# Patient Record
Sex: FEMALE | Race: BLACK OR AFRICAN AMERICAN | NOT HISPANIC OR LATINO | Employment: OTHER | URBAN - METROPOLITAN AREA
[De-identification: names, ages, dates, MRNs, and addresses within clinical notes are randomized per-mention and may not be internally consistent; named-entity substitution may affect disease eponyms.]

---

## 2017-10-09 ENCOUNTER — TRANSFERRED RECORDS (OUTPATIENT)
Dept: HEALTH INFORMATION MANAGEMENT | Facility: CLINIC | Age: 52
End: 2017-10-09

## 2018-04-23 LAB
ALT SERPL-CCNC: 9 U/L (ref 7–33)
AST SERPL-CCNC: 11 U/L (ref 9–37)

## 2018-04-25 ENCOUNTER — TRANSFERRED RECORDS (OUTPATIENT)
Dept: HEALTH INFORMATION MANAGEMENT | Facility: CLINIC | Age: 53
End: 2018-04-25

## 2018-04-30 ENCOUNTER — TRANSFERRED RECORDS (OUTPATIENT)
Dept: HEALTH INFORMATION MANAGEMENT | Facility: CLINIC | Age: 53
End: 2018-04-30

## 2018-05-09 ENCOUNTER — TRANSFERRED RECORDS (OUTPATIENT)
Dept: HEALTH INFORMATION MANAGEMENT | Facility: CLINIC | Age: 53
End: 2018-05-09

## 2018-07-05 NOTE — PROGRESS NOTES
"  SUBJECTIVE:   Jae Ramos is a 53 year old female who presents to clinic today for the following health issues:    Abnormal Mood Symptoms      Duration: ***    Description:  Depression: { :930420::\"no\"}  Anxiety: { :259616::\"no\"}  Panic attacks: { :584604::\"no\"}     Accompanying signs and symptoms: see PHQ-9 and AMELIA scores    History (similar episodes/previous evaluation): {.:159472::\"None\"}    Precipitating or alleviating factors: {.:376624::\"None\"}    Therapies tried and outcome: {.:891588::\"none\"}      {additional problems for provider to add:651522}    Problem list and histories reviewed & adjusted, as indicated.  Additional history: {NONE - AS DOCUMENTED:531196::\"as documented\"}    {HIST REVIEW/ LINKS 2:496198}    Reviewed and updated as needed this visit by clinical staff       Reviewed and updated as needed this visit by Provider         {PROVIDER CHARTING PREFERENCE:552356}  "

## 2018-07-10 ENCOUNTER — OFFICE VISIT (OUTPATIENT)
Dept: PEDIATRICS | Facility: CLINIC | Age: 53
End: 2018-07-10
Payer: COMMERCIAL

## 2018-07-10 VITALS
WEIGHT: 223.1 LBS | BODY MASS INDEX: 35.02 KG/M2 | OXYGEN SATURATION: 95 % | DIASTOLIC BLOOD PRESSURE: 64 MMHG | SYSTOLIC BLOOD PRESSURE: 108 MMHG | TEMPERATURE: 97.8 F | HEART RATE: 94 BPM | HEIGHT: 67 IN

## 2018-07-10 DIAGNOSIS — E03.9 ACQUIRED HYPOTHYROIDISM: ICD-10-CM

## 2018-07-10 DIAGNOSIS — Z86.59 HISTORY OF BEHAVIORAL AND MENTAL HEALTH PROBLEMS: Primary | ICD-10-CM

## 2018-07-10 DIAGNOSIS — E78.00 HYPERCHOLESTEREMIA: ICD-10-CM

## 2018-07-10 PROCEDURE — 99203 OFFICE O/P NEW LOW 30 MIN: CPT | Performed by: INTERNAL MEDICINE

## 2018-07-10 RX ORDER — DIVALPROEX SODIUM 250 MG/1
500 TABLET, EXTENDED RELEASE ORAL AT BEDTIME
COMMUNITY
Start: 2018-04-17 | End: 2018-07-25

## 2018-07-10 RX ORDER — QUETIAPINE FUMARATE 400 MG/1
400 TABLET, FILM COATED ORAL DAILY
Refills: 5 | COMMUNITY
Start: 2018-06-10

## 2018-07-10 RX ORDER — ATORVASTATIN CALCIUM 40 MG/1
40 TABLET, FILM COATED ORAL DAILY
COMMUNITY
Start: 2018-06-04 | End: 2018-10-22

## 2018-07-10 RX ORDER — CHOLECALCIFEROL (VITAMIN D3) 50 MCG
2000 TABLET ORAL DAILY
COMMUNITY
Start: 2018-06-04 | End: 2018-07-25

## 2018-07-10 RX ORDER — LURASIDONE HYDROCHLORIDE 40 MG/1
40 TABLET, FILM COATED ORAL DAILY
COMMUNITY
Start: 2018-04-17 | End: 2018-07-25

## 2018-07-10 RX ORDER — TOPIRAMATE 50 MG/1
150 TABLET, FILM COATED ORAL AT BEDTIME
Refills: 5 | COMMUNITY
Start: 2018-06-10

## 2018-07-10 RX ORDER — OMEPRAZOLE 40 MG/1
40 CAPSULE, DELAYED RELEASE ORAL DAILY
COMMUNITY
Start: 2018-06-04 | End: 2018-10-22

## 2018-07-10 RX ORDER — LEVOTHYROXINE SODIUM 75 UG/1
75 TABLET ORAL DAILY
COMMUNITY
Start: 2018-06-04 | End: 2018-10-22

## 2018-07-10 RX ORDER — QUETIAPINE FUMARATE 50 MG/1
50 TABLET, FILM COATED ORAL DAILY
Refills: 5 | COMMUNITY
Start: 2018-06-10 | End: 2019-06-27

## 2018-07-10 NOTE — PATIENT INSTRUCTIONS
I will request records from your previous provider and review them prior to your follow-up appointment.  In the mean time, the  will contact you.

## 2018-07-10 NOTE — MR AVS SNAPSHOT
After Visit Summary   7/10/2018    Jae Ramos    MRN: [id number]           Patient Information     Date Of Birth          1965        Visit Information        Provider Department      7/10/2018 3:00 PM Rohit Velez Mai, MD; [employer] [facility]        Today's Diagnoses     History of behavioral and mental health problems    -  1      Care Instructions    I will request records from your previous provider and review them prior to your follow-up appointment.  In the mean time, the  will contact you.          Follow-ups after your visit        Additional Services     CARE COORDINATION REFERRAL       Services are provided by a Care Coordinator for people with complex needs such as: medical, social, or financial troubles.  The Care Coordinator works with the patient and their Primary Care Provider to determine health goals, obtain resources, achieve outcomes, and develop care plans that help coordinate the patient's care.     Reason for Referral: Mental Wellness (Health) (Mental Illness/Chemical Depedency): Resources of Behavioral Health Services    Additional pertinent details:  Patient has severe, persistent mental health disease - moved to [place] - previous behavioral health home in [place].    Clinical Staff have discussed the Care Coordination Referral with the patient and/or caregiver: yes                  Follow-up notes from your care team     Return in about 2 weeks (around 7/24/2018) for follow-up.      Your next 10 appointments already scheduled     Jul 24, 2018  2:30 PM CDT   Office Visit with Rohit Velez MD   [facility] ([facility])    [address] [place] [id number] [phone]           Bring a current list of meds and any records pertaining to this visit. For Physicals, please bring immunization records and any forms needing to be filled out. Please arrive 10 minutes early  "to complete paperwork.              Who to contact     If you have questions or need follow up information about today's clinic visit or your schedule please contact [facility] directly at [phone].  Normal or non-critical lab and imaging results will be communicated to you by MyChart, letter or phone within 4 business days after the clinic has received the results. If you do not hear from us within 7 days, please contact the clinic through MyChart or phone. If you have a critical or abnormal lab result, we will notify you by phone as soon as possible.  Submit refill requests through [employer] or call your pharmacy and they will forward the refill request to us. Please allow 3 business days for your refill to be completed.          Additional Information About Your Visit        Care EveryWhere ID     This is your Care EveryWhere ID. This could be used by other organizations to access your [place] medical records  [id number]        Your Vitals Were     Pulse Temperature Height Pulse Oximetry BMI (Body Mass Index)       94 97.8  F (36.6  C) (Oral) 5' 7\" (1.702 m) 95% 34.94 kg/m2        Blood Pressure from Last 3 Encounters:   07/10/18 108/64    Weight from Last 3 Encounters:   07/10/18 223 lb 1.6 oz (101.2 kg)              We Performed the Following     CARE COORDINATION REFERRAL        Primary Care Provider Fax #    Physician No Ref-Primary [phone]       No address on file        Equal Access to Services     MEÑO GAVIRIA : Hadii mouna ku hadasho Soliali, waaxda luqadaha, qaybta kaalmada adeegmeredithda, sagar mckeon . So [facility] [phone].    ATENCIÓN: Si habla español, tiene a sanchez disposición servicios gratuitos de asistencia lingüística. Feli al [phone].    We comply with applicable federal civil rights laws and [place] laws. We do not discriminate on the basis of race, color, national origin, age, disability, sex, sexual orientation, or gender identity.          "   Thank you!     Thank you for choosing [facility]  for your care. Our goal is always to provide you with excellent care. Hearing back from our patients is one way we can continue to improve our services. Please take a few minutes to complete the written survey that you may receive in the mail after your visit with us. Thank you!             Your Updated Medication List - Protect others around you: Learn how to safely use, store and throw away your medicines at www.disposemymeds.org.          This list is accurate as of 7/10/18  4:13 PM.  Always use your most recent med list.                   Brand Name Dispense Instructions for use Diagnosis    atorvastatin 40 MG tablet    LIPITOR     Take 40 mg by mouth daily        KWADWO CONTOUR test strip   Generic drug:  blood glucose monitoring           Calcium + D3 600-200 MG-UNIT Tabs           divalproex sodium extended-release 250 MG 24 hr tablet    DEPAKOTE ER     Take 500 mg by mouth 2 times daily        LATUDA 40 MG Tabs tablet   Generic drug:  lurasidone      Take 40 mg by mouth daily        levothyroxine 75 MCG tablet    SYNTHROID/LEVOTHROID     Take 75 mcg by mouth daily        omeprazole 40 MG capsule    priLOSEC     Take 40 mg by mouth daily        * QUEtiapine 400 MG tablet    SEROquel     Take 400 mg by mouth daily        * QUEtiapine 50 MG tablet    SEROquel     Take 50 mg by mouth daily        topiramate 50 MG tablet    TOPAMAX     Take 50 mg by mouth 3 times daily        vitamin D 2000 units tablet      Take 2,000 Units by mouth daily        * Notice:  This list has 2 medication(s) that are the same as other medications prescribed for you. Read the directions carefully, and ask your doctor or other care provider to review them with you.

## 2018-07-10 NOTE — PROGRESS NOTES
SUBJECTIVE:   Jae Ramos is a 53 year old female who presents to clinic today for the following health issues:    New Patient/Transfer of Care    Patient has depression, anxiety, schizophrenia. Patient experiences hallucinations, disorientation/confusion, and talks to herself and things that are not there. This is baseline per daughter.    Patient is here today with her step-daughter who has brought in legal guardianship paper that shows son's Brian and Bare have legal guardianship over patient due to mental instability.     PREVIOUS PROVIDERS  [facility] in [place], [place] by has been her primary clinic. Dr. Roc Vivar  Psychiatrist Dr. Coker has been prescribing medications.     Problem list and histories reviewed & adjusted, as indicated.  Additional history: as documented    Establish Care/Chart Review:    Health Status:  Patient does not make own medical decisions - children are legal proxies.  Patient has severe persistent mental health disorder.    PMH:    Bipolar    Pending records for more information    Medications:  Daughter brought meds and med rec updated.  Pending records    Current Outpatient Prescriptions   Medication     atorvastatin (LIPITOR) 40 MG tablet     KWADWO CONTOUR test strip     Calcium Carb-Cholecalciferol (CALCIUM + D3) 600-200 MG-UNIT TABS     Cholecalciferol (VITAMIN D) 2000 units tablet     divalproex sodium extended-release (DEPAKOTE ER) 250 MG 24 hr tablet     LATUDA 40 MG TABS tablet     levothyroxine (SYNTHROID/LEVOTHROID) 75 MCG tablet     omeprazole (PRILOSEC) 40 MG capsule     QUEtiapine (SEROQUEL) 400 MG tablet     QUEtiapine (SEROQUEL) 50 MG tablet     topiramate (TOPAMAX) 50 MG tablet     No current facility-administered medications for this visit.      Health Maintenance:    Pending records    Social hx:  Living:    Lives with children who take care of her.  No home health care currently.  Have required home health nurse in the past, but only intermittently.   "Family members able to administer meds and help with ADLs at this time.   Recently moved in May 2018 from [facility] to [place]   Previously had established health home with psychiatrist to manage meds in [place].   Need for increased social support?    No need for escalation of care per family, but may need more coordinated behavioral health home.    Reviewed and updated as needed this visit by clinical staff       Reviewed and updated as needed this visit by Provider         ROS:  + nocturia  + decreased energy  All other systems on a 7-point review are negative, unless otherwise noted in HPI      OBJECTIVE:     /64 (BP Location: Right arm, Patient Position: Chair)  Pulse 94  Temp 97.8  F (36.6  C) (Oral)  Ht 5' 7\" (1.702 m)  Wt 223 lb 1.6 oz (101.2 kg)  SpO2 95%  BMI 34.94 kg/m2  Body mass index is 34.94 kg/(m^2).  GENERAL: healthy, alert and no distress  EYES: Eyes grossly normal to inspection  NECK: no asymmetry, masses, or scars  MS: no gross musculoskeletal defects noted, no edema  SKIN: no suspicious lesions or rashes  PSYCH: calm, internally occupied and talking to self, will engage in conversation briefly, appears to be responding to auditory hallucinations    Diagnostic Test Results:  none     ASSESSMENT/PLAN:       1. History of behavioral and mental health problems  Patient here to establish care.  Diagnosed with severe and persistent mental illness, including psychosis.  Is cared for by family members.  Previously had behavioral health home and PCP, but recently moved to [facility].  Pending records from former healthcare provider.  Needs behavioral health home including psychiatry to manage neuroleptic medications.  Will initiate referral with care coordination.  - CARE COORDINATION REFERRAL    Will address nocturia and energy concerns at follow-up once I review records requested today.    2. Acquired hypothyroidism  On synthroid.  Appears stable clinically.  Pending records.    3. " Hypercholesteremia  On statin and baby aspirin.  Pending records for further details regarding any cardiovascular disease.      F/u in 2 weeks after full review of records and discussion with care coordination.     Greater than 50% of the 45 minute visit was spent in counseling and coordination of care regarding patient conditions above.    Charmaine Velez MD  [facility]

## 2018-07-11 ENCOUNTER — PATIENT OUTREACH (OUTPATIENT)
Dept: CARE COORDINATION | Facility: CLINIC | Age: 53
End: 2018-07-11

## 2018-07-11 NOTE — PROGRESS NOTES
Clinic Care Coordination Contact  [facility]/Voicemail    Referral Source: PCP  Clinical Data: Care Coordinator Outreach  Outreach attempted x 2.  Left message on voicemail with call back information and requested return call. Please see contact log for detailed outreach attempt dates.  Plan: Care Coordinator will mail out care coordination introduction letter with care coordinator contact information and explanation of care coordination services. Care Coordinator will do no further outreaches at this time.    MARIBELL Vora  Clinic Care Coordinator  [phone]  [email or web address]

## 2018-07-11 NOTE — LETTER
[place] CARE COORDINATION    July 16, 2018    Jae Ramos  [address] [place] [address]      Dear Jae,    I am a clinic care coordinator who works at the [facility]. I have been trying to reach you recently to introduce Clinic Care Coordination and to see if there was anything I could assist you with. I understand that you recently moved here and wanted to see if you needed any help finding your specialty providers or connecting with services. Below is a description of clinic care coordination and how I can further assist you.     The clinic care coordinator is a registered nurse and/or  who understand the health care system. The goal of clinic care coordination is to help you manage your health and improve access to the [facility] system in the most efficient manner. The registered nurse can assist you in meeting your health care goals by providing education, coordinating services, and strengthening the communication among your providers. The  can assist you with financial, behavioral, psychosocial, chemical dependency, counseling, and/or psychiatric resources.    Please feel free to contact me at [phone], with any questions or concerns. We at [facility] are focused on providing you with the highest-quality healthcare experience possible and that all starts with you.     Sincerely,     Sho Magallanes

## 2018-07-16 PROBLEM — E78.00 HYPERCHOLESTEREMIA: Status: ACTIVE | Noted: 2018-07-16

## 2018-07-16 PROBLEM — Z86.59 HISTORY OF BEHAVIORAL AND MENTAL HEALTH PROBLEMS: Status: ACTIVE | Noted: 2018-07-16

## 2018-07-21 NOTE — PROGRESS NOTES
SUBJECTIVE:   Jae Ramos is a 53 year old female who presents to clinic today for the following health issues:    Patient is here today to follow up on mental health/establish care.  Since our last visit, I have received records from her previous location of care.  Last OV: 7/10/2018.    Establish Care/Chart Review:    Health Status:  Patient does not make own medical decisions - children are legal proxies.  Patient has severe persistent mental health disorder.    PMH:    Schizoaffective disorder with bipolar subtype    Hypothyroidism    Last TSH 1.66, repeat due in October 2018    Type 2 DM    A1C 5.8 in October 2017, sugars stable    GERD    HLD    On statin    LDL 53,  (October 2017)    Medications:  Current Outpatient Prescriptions   Medication     atorvastatin (LIPITOR) 40 MG tablet     KWADWO CONTOUR test strip     levothyroxine (SYNTHROID/LEVOTHROID) 75 MCG tablet     omeprazole (PRILOSEC) 40 MG capsule     QUEtiapine (SEROQUEL) 400 MG tablet     QUEtiapine (SEROQUEL) 50 MG tablet     topiramate (TOPAMAX) 50 MG tablet     Calcium Carb-Cholecalciferol (CALCIUM + D3) 600-200 MG-UNIT TABS     Cholecalciferol (VITAMIN D) 2000 units tablet     divalproex sodium extended-release (DEPAKOTE ER) 500 MG 24 hr tablet     IBUPROFEN PO     No current facility-administered medications for this visit.        Social hx:  Living:    Lives with children who take care of her.  No home health care currently.  Have required home health nurse in the past, but only intermittently.  Family members able to administer meds and help with ADLs at this time.   Recently moved in May 2018 from [facility] to [place]   Previously had established health home with psychiatrist to manage meds in [place].   Need for increased social support?    No need for escalation of care per family, but may need more coordinated behavioral health home.    Reviewed and updated as needed this visit by clinical staff       Reviewed and updated as needed  this visit by Provider         ROS:  + nocturia  + decreased energy  All other systems on a 7-point review are negative, unless otherwise noted in HPI    ROS:  All other systems on a 10-point review are negative, unless otherwise noted in HPI      OBJECTIVE:     BP 98/60 (BP Location: Right arm, Patient Position: Chair)  Pulse 106  Temp 98  F (36.7  C) (Oral)  Wt 222 lb 12.8 oz (101.1 kg)  SpO2 93%  BMI 34.9 kg/m2  Body mass index is 34.9 kg/(m^2).  GENERAL: elderly, overweight, alert and no distress  EYES: Eyes grossly normal to inspection  NECK: no asymmetry, masses, or scars  MS: no gross musculoskeletal defects noted, no edema  SKIN: no suspicious lesions or rashes  PSYCH: calm, internally occupied and talking to self, will engage in conversation briefly, appears to be responding to auditory hallucinations    Diagnostic Test Results:  none     ASSESSMENT/PLAN:     52 yo Somalian female here for establish care f/u after receiving previous records.    1. Schizoaffective disorder, bipolar type (H)  Discussed case with care coordinator, who is working on transferring home health services from previous county to current.  Needs psychiatry referral for medication management.  In mean time, will send to our pharmacy ([facility]) for management in the interim.  - MENTAL HEALTH REFERRAL  - Adult; Psychiatry and Medication Management; Psychiatry; Other: Behavioral Healthcare Providers [phone]; We will contact you to schedule the appointment or please call with any questions    2. Type 2 diabetes mellitus with complication, without long-term current use of insulin (H)  Stable.  A1C well-controlled at 5.8.  F/u in October for 6 mo recheck.    3. Gastroesophageal reflux disease without esophagitis  Stable, well controlled on current regimen.    4. Acquired hypothyroidism  Stable, last TSH 1.66.  Next due Oct 2018.    5. Hypercholesteremia  Lipids well controlled on statin therapy.  Due for recheck in Oct  2018.      Patient Instructions   Referral to psychiatry made today.  They will contact you to make the first appointment with psychiatrist.  Please make sure to answer phone calls regarding scheduling initial appointment.  Will work in collaboration with care coordinator, Sho Zimmerman.    Please follow-up with me in 3 months for physical exam.  Please come fasting.    Greater than 50% of the 25 minute visit was spent in counseling and coordination of care, using a , regarding patient conditions above.      Charmaine Velez MD  [facility]

## 2018-07-24 ENCOUNTER — OFFICE VISIT (OUTPATIENT)
Dept: PEDIATRICS | Facility: CLINIC | Age: 53
End: 2018-07-24
Payer: COMMERCIAL

## 2018-07-24 ENCOUNTER — PATIENT OUTREACH (OUTPATIENT)
Dept: CARE COORDINATION | Facility: CLINIC | Age: 53
End: 2018-07-24

## 2018-07-24 VITALS
BODY MASS INDEX: 34.9 KG/M2 | HEART RATE: 106 BPM | OXYGEN SATURATION: 93 % | TEMPERATURE: 98 F | SYSTOLIC BLOOD PRESSURE: 98 MMHG | WEIGHT: 222.8 LBS | DIASTOLIC BLOOD PRESSURE: 60 MMHG

## 2018-07-24 DIAGNOSIS — E11.8 TYPE 2 DIABETES MELLITUS WITH COMPLICATION, WITHOUT LONG-TERM CURRENT USE OF INSULIN (H): ICD-10-CM

## 2018-07-24 DIAGNOSIS — E78.00 HYPERCHOLESTEREMIA: ICD-10-CM

## 2018-07-24 DIAGNOSIS — F25.0 SCHIZOAFFECTIVE DISORDER, BIPOLAR TYPE (H): Primary | ICD-10-CM

## 2018-07-24 DIAGNOSIS — E03.9 ACQUIRED HYPOTHYROIDISM: ICD-10-CM

## 2018-07-24 DIAGNOSIS — K21.9 GASTROESOPHAGEAL REFLUX DISEASE WITHOUT ESOPHAGITIS: ICD-10-CM

## 2018-07-24 PROCEDURE — 99214 OFFICE O/P EST MOD 30 MIN: CPT | Performed by: INTERNAL MEDICINE

## 2018-07-24 NOTE — LETTER
[place] CARE COORDINATION    July 26, 2018    Brian Poon    On behalf of     Jae Ramos  [address] [place] [address]      Dear Jae,    I am a clinic care coordinator who works with Charmaine Velez MD at [facility]. I wanted to thank you for spending the time to talk with me. Included is a flyer with a description of clinic care coordination and how I can further assist you.     Just a reminder you have an appointment coming up.    Appointment scheduled with:  [facility]   [address]  [place] [address]  [phone]  Date: 8/1/2018  Time: 1:00pm for paperwork 2:00pm  Provider: LUCERO Evans    Also, your [facility] services will continue to be managed by [facility] until 9/1/18. If you have any questions about services until that time you will need to contact Jae's Regina CASON-[phone] to discuss.     Please feel free to contact me at [phone], with any questions or concerns. We at [facility] are focused on providing you with the highest-quality healthcare experience possible and that all starts with you.     Sincerely,     Sho Magallanes  Enclosed: I have enclosed a copy of the Complex Care Plan. This has helpful information and goals that we have talked about. Please keep this in an easy to access place to use as needed.

## 2018-07-24 NOTE — LETTER
[facility]  Complex Care Plan  About Me  Patient Name:  Jae Ramos    YOB: 1965  Age:     53 year old   [facility] MRN:   [id number] Telephone Information:    Home Phone [phone]   Mobile [phone]       Address:    [address] [place] [address] [address] [place] [address] Email address:  No e-mail address on record      Emergency Contact(s)  Name Relationship Lgl Grd Work Phone Home Phone Mobile Phone   1. FAWN PAIZ* Son   [phone]           Primary language:  Citizen of [place]     needed? Yes   [facility]:  [phone] op. 1  Other communication barriers: Language barrier, Cognitive impairment- Guardianship in place to assist, Caregiver, English as a second language  Preferred Method of Communication:   Phone and E-mail  Current living arrangement: I live in a private home with family  Mobility Status/ Medical Equipment:      Health Maintenance  Health Maintenance Reviewed: Due/Overdue     My Access Plan  Medical Emergency 911   Primary Clinic Line [facility] - [phone]   24 Hour Appointment Line [phone] or  [phone]) (toll-free)   24 Hour Nurse Line [phone] (toll-free)   Preferred Urgent Care [facility] [place], [phone]   Preferred Hospital [facility]  [phone]   Preferred Pharmacy [facility] - [address]      Behavioral Health Crisis Line The National Suicide Prevention Lifeline at 1-575.958.5105 or 911     My Care Team Members    Patient Care Team       Relationship Specialty Notifications Start End    Rohit Velez Mai, MD PCP - General Internal Medicine  7/24/18     Phone: [phone] Fax: [phone] [address] [place] [address]    Sho Magallanes LSW Lead Care Coordinator Primary Care - CC  7/24/18     Phone: [phone] Fax: [phone]                My Care Plans  Self Management and  Treatment Plan  Goals   Goals        General    1.Mental Health Management (pt-stated)     Notes - Note created  7/25/2018  3:17 PM by Sho Magallanes LSW    Goal Statement: I (pt's family/guardian) would like to find a psychiatrist.   Measure of Success: psychiatry established communication with PCP  Supportive Steps to Achieve: PCP made a referral to P pt was contacted   Barriers: language, [facility] guardian  Strengths: insurance coverage  Date to Achieve By: 9/2018               Advance Care Plans/Directives Type:   Type Advanced Care Plans/Directives: Other (Guardianship)    My Medical and Care Information  Problem List   Patient Active Problem List   Diagnosis     History of behavioral and mental health problems     Acquired hypothyroidism     Hypercholesteremia     Type 2 diabetes mellitus (H)     Gastroesophageal reflux disease without esophagitis     Schizoaffective disorder, bipolar type (H)      Current Medications and Allergies:  See printed Medication Report.    Care Coordination Start Date: 7/24/2018   Frequency of Care Coordination: monthly   Form Last Updated: 07/25/2018

## 2018-07-24 NOTE — PROGRESS NOTES
Clinic Care Coordination Contact  Care Team Conversations      Discussed possible resources for PCP to share during appointment.     [facility]  [address]  [place] [address]  P: [phone]  F: [phone]  [facility] provides a wide array of services and programs including outpatient clinics, community-based support and rehabilitative programs, hospital liaison services, intensive family services, and long-term supports. Services are available to anyone.  Our specialty is caring for people with severe and persistent mental illness (such as schizophrenia, major depression, post-traumatic stress disorder, and bipolar disorder) and other severely disabling disorders requiring crisis resolution or ongoing and long-term support and treatment. We are the provider of choice for maintaining people with these disorders at home, and off the streets, out of the jails, and out of the hospital.-- bilingual/bicultural mental health professionals. It is dedicated to providing culturally competent mental health and other  to families, including refugee and immigrant with multiple layers of complex needs, exposure to violence and trauma both in their current environment and in their native countries, and weakening intergenerational relationships.? Staff in this agency speaks multiple languages including English, Algerian, French, Oromo, Hebrew, and Swahili.    [facility]  [facility]  Telephone (International Code +1) [phone]  Toll Free [phone]  Torture survivor rehabilitation services in [place] [phone]  [address] [place] [address]  In [place], torture survivors receive out-patient care at our [facility] in [place]. A team of healers provides medical and nursing care, psychotherapy,  and massage and physical therapy.    MARIBELL Vora  Clinic Care  Coordinator  [phone]  [email or web address]

## 2018-07-24 NOTE — PATIENT INSTRUCTIONS
Referral to psychiatry made today.  They will contact you to make the first appointment with psychiatrist.  Please make sure to answer phone calls regarding scheduling initial appointment.  Will work in collaboration with care coordinator, Sho Zimmerman.    Please follow-up with me in 3 months for physical exam.  Please come fasting.

## 2018-07-24 NOTE — MR AVS SNAPSHOT
After Visit Summary   7/24/2018    Jae Ramos    MRN: [id number]           Patient Information     Date Of Birth          1965        Visit Information        Provider Department      7/24/2018 2:15 PM Rohit Velez Mai, MD; [facility] [employer] [facility]        Today's Diagnoses     Type 2 diabetes mellitus with complication, without long-term current use of insulin (H)        Gastroesophageal reflux disease without esophagitis        Schizoaffective disorder, bipolar type (H)          Care Instructions    Referral to psychiatry made today.  They will contact you to make the first appointment with psychiatrist.  Please make sure to answer phone calls regarding scheduling initial appointment.  Will work in collaboration with care coordinator, Sho Zimmerman.    Please follow-up with me in 3 months for physical exam.  Please come fasting.          Follow-ups after your visit        Additional Services     MED THERAPY MANAGE REFERRAL       Your provider has referred you to: **[facility] Scheduling (numerous locations) [phone]   [email or web address]  [facility]: [facility] [phone]   [email or web address]    Reason for Referral: medication management (psychiatric medications and transferring care from a different county).    The [facility] department will contact you to schedule an appointment.  You may also schedule the appointment by calling [phone].  For [facility] [place] patients, please call [phone] to confirm/schedule your appointment on the next business day.    This service is designed to help you get the most from your medications.  A specially trained Pharmacist will work closely with you and your providers to solve any questions, concerns, issues or problems related to your  medications.    Please bring all of your prescription and non-prescription medications (such as vitamins, over-the-counter medications, and herbals) or a detailed medication list to your appointment.    If you have a glucose meter or other home monitoring information, please also bring this to your appointment (i.e. blood glucose log, blood pressure log, pain log, etc.).            MENTAL HEALTH REFERRAL  - Adult; Psychiatry and Medication Management; Psychiatry; Other: [facility] Providers [phone]; We will contact you to schedule the appointment or please call with any questions       All scheduling is subject to the client's specific insurance plan & benefits, provider/location availability, and provider clinical specialities.  Please arrive 15 minutes early for your first appointment and bring your completed paperwork.    Please be aware that coverage of these services is subject to the terms and limitations of your health insurance plan.  Call member services at your health plan with any benefit or coverage questions.                            Follow-up notes from your care team     Return in about 3 months (around 10/24/2018) for Physical Exam, Lab Work.      Your next 10 appointments already scheduled     Jul 25, 2018  8:30 AM CDT   Office Visit with [facility]Vka [facility] ([facility])    [address] [place] [address]  [address]  [facility] [address] [phone]           Bring a current list of meds and any records pertaining to this visit. For Physicals, please bring immunization records and any forms needing to be filled out. Please arrive 10 minutes early to complete paperwork.            Oct 22, 2018  3:50 PM CDT   PHYSICAL with Rohit Velez MD   [facility] ([facility])    [address] [place] [address]  [address]  [facility] [address] [phone]              Who to contact     If you have questions or  need follow up information about today's clinic visit or your schedule please contact [facility] MARIELLE directly at [phone].  Normal or non-critical lab and imaging results will be communicated to you by MyChart, letter or phone within 4 business days after the clinic has received the results. If you do not hear from us within 7 days, please contact the clinic through MyChart or phone. If you have a critical or abnormal lab result, we will notify you by phone as soon as possible.  Submit refill requests through [employer] or call your pharmacy and they will forward the refill request to us. Please allow 3 business days for your refill to be completed.          Additional Information About Your Visit        Care EveryWhere ID     This is your Care EveryWhere ID. This could be used by other organizations to access your [place] medical records  [id number]        Your Vitals Were     Pulse Temperature Pulse Oximetry BMI (Body Mass Index)          106 98  F (36.7  C) (Oral) 93% 34.9 kg/m2         Blood Pressure from Last 3 Encounters:   07/24/18 98/60   07/10/18 108/64    Weight from Last 3 Encounters:   07/24/18 222 lb 12.8 oz (101.1 kg)   07/10/18 223 lb 1.6 oz (101.2 kg)              We Performed the Following     MED THERAPY MANAGE REFERRAL     MENTAL HEALTH REFERRAL  - Adult; Psychiatry and Medication Management; Psychiatry; Other: [facility] Providers [phone]; We will contact you to schedule the appointment or please call with any questions        Primary Care Provider Office Phone # Fax #    Micaelantv Devi Velez [phone] [address] [place] [address]        Equal Access to Services     [facility]: Hadii mouna Mobley, waaxda luqadaha, qaybta kaalsagar quintanilla. So [facility] [phone].    ATENCIÓN: Si habla español, tiene a sanchez disposición servicios gratuitos de asistencia lingüística. Llame al  [phone].    We comply with applicable federal civil rights laws and [place] laws. We do not discriminate on the basis of race, color, national origin, age, disability, sex, sexual orientation, or gender identity.            Thank you!     Thank you for choosing [facility] MARIEL[facility]  for your care. Our goal is always to provide you with excellent care. Hearing back from our patients is one way we can continue to improve our services. Please take a few minutes to complete the written survey that you may receive in the mail after your visit with us. Thank you!             Your Updated Medication List - Protect others around you: Learn how to safely use, store and throw away your medicines at www.disposemymeds.org.          This list is accurate as of 7/24/18  3:52 PM.  Always use your most recent med list.                   Brand Name Dispense Instructions for use Diagnosis    atorvastatin 40 MG tablet    LIPITOR     Take 40 mg by mouth daily        KWADWO CONTOUR test strip   Generic drug:  blood glucose monitoring           Calcium + D3 600-200 MG-UNIT Tabs           divalproex sodium extended-release 250 MG 24 hr tablet    DEPAKOTE ER     Take 500 mg by mouth 2 times daily        LATUDA 40 MG Tabs tablet   Generic drug:  lurasidone      Take 40 mg by mouth daily        levothyroxine 75 MCG tablet    SYNTHROID/LEVOTHROID     Take 75 mcg by mouth daily        omeprazole 40 MG capsule    priLOSEC     Take 40 mg by mouth daily        * QUEtiapine 400 MG tablet    SEROquel     Take 400 mg by mouth daily        * QUEtiapine 50 MG tablet    SEROquel     Take 50 mg by mouth daily        topiramate 50 MG tablet    TOPAMAX     Take 50 mg by mouth 3 times daily        vitamin D 2000 units tablet      Take 2,000 Units by mouth daily        * Notice:  This list has 2 medication(s) that are the same as other medications prescribed for you. Read the directions carefully, and ask your doctor or other care provider to review  them with you.

## 2018-07-25 ENCOUNTER — HEALTH MAINTENANCE LETTER (OUTPATIENT)
Age: 53
End: 2018-07-25

## 2018-07-25 ENCOUNTER — OFFICE VISIT (OUTPATIENT)
Dept: PHARMACY | Facility: CLINIC | Age: 53
End: 2018-07-25
Payer: COMMERCIAL

## 2018-07-25 VITALS
BODY MASS INDEX: 35.08 KG/M2 | HEART RATE: 92 BPM | SYSTOLIC BLOOD PRESSURE: 125 MMHG | WEIGHT: 224 LBS | DIASTOLIC BLOOD PRESSURE: 78 MMHG

## 2018-07-25 DIAGNOSIS — G89.29 OTHER CHRONIC PAIN: ICD-10-CM

## 2018-07-25 DIAGNOSIS — E78.00 HYPERCHOLESTEREMIA: ICD-10-CM

## 2018-07-25 DIAGNOSIS — E11.8 TYPE 2 DIABETES MELLITUS WITH COMPLICATION, WITHOUT LONG-TERM CURRENT USE OF INSULIN (H): ICD-10-CM

## 2018-07-25 DIAGNOSIS — K21.9 GASTROESOPHAGEAL REFLUX DISEASE WITHOUT ESOPHAGITIS: ICD-10-CM

## 2018-07-25 DIAGNOSIS — F25.0 SCHIZOAFFECTIVE DISORDER, BIPOLAR TYPE (H): Primary | ICD-10-CM

## 2018-07-25 DIAGNOSIS — E03.9 ACQUIRED HYPOTHYROIDISM: ICD-10-CM

## 2018-07-25 DIAGNOSIS — E55.9 VITAMIN D DEFICIENCY: ICD-10-CM

## 2018-07-25 DIAGNOSIS — Z91.89 AT RISK FOR BONE DENSITY LOSS: ICD-10-CM

## 2018-07-25 PROCEDURE — 99605 MTMS BY PHARM NP 15 MIN: CPT | Performed by: PHARMACIST

## 2018-07-25 PROCEDURE — 99607 MTMS BY PHARM ADDL 15 MIN: CPT | Performed by: PHARMACIST

## 2018-07-25 RX ORDER — DIVALPROEX SODIUM 500 MG/1
1000 TABLET, EXTENDED RELEASE ORAL AT BEDTIME
COMMUNITY
End: 2018-07-25

## 2018-07-25 RX ORDER — CHOLECALCIFEROL (VITAMIN D3) 50 MCG
2000 TABLET ORAL DAILY
Qty: 30 TABLET | Refills: 11 | Status: SHIPPED | OUTPATIENT
Start: 2018-07-25 | End: 2018-10-22

## 2018-07-25 NOTE — PATIENT INSTRUCTIONS
Recommendations from today's MTM visit:                                                    Today we reviewed what your medicines are for, how to know if they are working, that your medicines are safe and how to make your medicine regimen as easy as possible.      1. Set up medications in pill boxes, AM and PM.  Separate the calcium+D and vitamin D and take with food during the day.    2. Vika will ask Dr. Velez about refills on Depakote and vitamin D.     Next MTM/pharmacist visit: as needed    To schedule another MTM appointment, please call the clinic directly or you may call the MTM scheduling line at [phone] or toll-free at [phone].     My Clinical Pharmacist's contact information:                                                      It was a pleasure seeing you today!  Please feel free to contact me with any questions or concerns you have.      Vika Peterson, PharmD [facility]  Medication Therapy Management Practitioner   #[phone]    You may receive a survey about the MTM services you received.  I would appreciate your feedback to help me serve you better in the future. Please fill it out and return it when you can. Your comments will be anonymous.

## 2018-07-25 NOTE — Clinical Note
Sent refill encounter for the depakote.  I did not put her down for ongoing MTM follow-ups but would be happy to do this if you want me to.  Maybe once a year? Vika

## 2018-07-25 NOTE — PROGRESS NOTES
Clinic Care Coordination Contact  Clinic Care Coordination Contact  OUTREACH    Referral Information:  Referral Source: PCP    Primary Diagnosis: Behavioral Health    Chief Complaint   Patient presents with     Clinic Care Coordination - Follow-up     Mental Health        Universal Utilization: Pt needs to establish with specialty providers including psychiatry after moving from [place], [place]  Utilization    Last refreshed: 7/25/2018  9:12 AM:  No Show Count (past year) 0       Last refreshed: 7/25/2018  9:12 AM:  ED visits 0       Last refreshed: 7/25/2018  9:12 AM:  Hospital admissions 0          Current as of: 7/25/2018  9:12 AM           Clinical Concerns:  Current Medical Concerns: Pt established care in the clinic on 7/10/18. PCP requested that [facility] help facilitate psychiatric care and assist with the transfer of [facility] services. Outreach to pt's family today to discuss.     Appointment scheduled with:  Sushila and Meeta   [address]  [address]  [place] [address]  [phone]  Date: 8/1/2018  Time: 1:00pm for paperwork 2:00pm  Provider: LUCERO Evans    Education Provided to patient: Provided information for psychiatry services, and counseling including information below. Pt's son/guardian reports that the pt didn't experience torture, but does have significant PTSD affects and will review information about [facility].    Health Maintenance Reviewed: Due/Overdue     Medication Management:  Pt is needing medication management from a MH provider. Pt was scheduled for an appointment next week for medication management.     Functional Status:  Dependent upon family for all ADL's     Living Situation:  Current living arrangement:: I live in a private home with family  Type of residence:: Private home - stairs    Transportation:  Transportation concerns: No  Transportation means:: Medical transport, Family     Psychosocial:  Bahai or spiritual beliefs that impact treatment::  No  Mental health DX:: Yes  Mental health DX how managed:: Medication  Mental health management concern: Yes- complex medication regimen. HP not complete from previous provider. Needs new provider asap.  Informal Support system:: Family, Children  Financial/Insurance: Amy UNGER, Left a message with [facility] Intake      Resources and Interventions:  Current Resources: Pt was previously enrolled in [facility] Resources: None  Advanced Care Plans/Directives on file:: Yes  Type Advanced Care Plans/Directives: Other (Guardianship)     Goals:   Goals        General    1.Mental Health Management (pt-stated)     Notes - Note created  7/25/2018  3:17 PM by Sho Magallanes LSW    Goal Statement: I (pt's family/guardian) would like to find a psychiatrist.   Measure of Success: psychiatry established communication with PCP  Supportive Steps to Achieve: PCP made a referral to P pt was contacted   Barriers: language, UTC guardian  Strengths: insurance coverage  Date to Achieve By: 9/2018            Patient/Caregiver understanding: Pt reports understanding and denies any additional questions or concerns at this times. MAXINE CC engaged in AIDET communication during encounter.    Outreach Frequency: monthly  Future Appointments              In 2 months Rohit Velez Mai, MD [facility] KEILA Carlson          Plan: Pt's family will assist pt with appointment. Pt's son/guardian will review information about counseling center. [facility] will await return call from [facility] to complete intake referral.     MARIBELL Vora  Clinic Care Coordinator  [phone]  [email or web address]

## 2018-07-25 NOTE — MR AVS SNAPSHOT
After Visit Summary   7/25/2018    Jae Ramos    MRN: [id number]           Patient Information     Date Of Birth          1965        Visit Information        Provider Department      7/25/2018 8:30 AM Vika Peterson [facility]; [employer] [facility] MTM        Care Instructions    Recommendations from today's MTM visit:                                                    Today we reviewed what your medicines are for, how to know if they are working, that your medicines are safe and how to make your medicine regimen as easy as possible.      1. Set up medications in pill boxes, AM and PM.  Separate the calcium+D and vitamin D and take with food during the day.    2. Vika will ask Dr. Velez about refills on Depakote and vitamin D.     Next MTM/pharmacist visit: as needed    To schedule another MTM appointment, please call the clinic directly or you may call the MTM scheduling line at [phone] or toll-free at [phone].     My Clinical Pharmacist's contact information:                                                      It was a pleasure seeing you today!  Please feel free to contact me with any questions or concerns you have.      Vika Peterson, PharmD [facility]  Medication Therapy Management Practitioner   #[phone]    You may receive a survey about the MTM services you received.  I would appreciate your feedback to help me serve you better in the future. Please fill it out and return it when you can. Your comments will be anonymous.                  Follow-ups after your visit        Your next 10 appointments already scheduled     Oct 22, 2018  3:50 PM CDT   PHYSICAL with Rohit [facility]elez MD   [facility]    [address] [place] [address] [phone]              Who to contact     If you have questions or need follow up information about today's clinic visit or your  schedule please contact [facility] MARIELLE MENDOZA directly at [phone].  Normal or non-critical lab and imaging results will be communicated to you by MyChart, letter or phone within 4 business days after the clinic has received the results. If you do not hear from us within 7 days, please contact the clinic through MyChart or phone. If you have a critical or abnormal lab result, we will notify you by phone as soon as possible.  Submit refill requests through [employer] or call your pharmacy and they will forward the refill request to us. Please allow 3 business days for your refill to be completed.          Additional Information About Your Visit        Care EveryWhere ID     This is your Care EveryWhere ID. This could be used by other organizations to access your [place] medical records  [id number]        Your Vitals Were     Pulse BMI (Body Mass Index)                92 35.08 kg/m2           Blood Pressure from Last 3 Encounters:   07/25/18 125/78   07/24/18 98/60   07/10/18 108/64    Weight from Last 3 Encounters:   07/25/18 224 lb (101.6 kg)   07/24/18 222 lb 12.8 oz (101.1 kg)   07/10/18 223 lb 1.6 oz (101.2 kg)              Today, you had the following     No orders found for display         Today's Medication Changes          These changes are accurate as of 7/25/18  9:07 AM.  If you have any questions, ask your nurse or doctor.               These medicines have changed or have updated prescriptions.        Dose/Directions    Calcium + D3 600-200 MG-UNIT Tabs   This may have changed:    - how much to take  - how to take this  - when to take this   Changed by:  Vika Peterson, [facility]        Dose:  1 tablet   Take 1 tablet by mouth 2 times daily   Refills:  0                Primary Care Provider Office Phone # Fax #    Rohit Velez [phone] [address] [place] [address]        Equal Access to Services     MEÑO GAVIRIA AH: Yenni Mobley  washankarda ginaalpa, qaybta katawana ford, sagar gunnaajakob ah. So [facility] [phone].    ATENCIÓN: Si ciara bocanegra, tiene a sanchez disposición servicios gratuitos de asistencia lingüística. Feli al [phone].    We comply with applicable federal civil rights laws and [place] laws. We do not discriminate on the basis of race, color, national origin, age, disability, sex, sexual orientation, or gender identity.            Thank you!     Thank you for choosing [facility]  for your care. Our goal is always to provide you with excellent care. Hearing back from our patients is one way we can continue to improve our services. Please take a few minutes to complete the written survey that you may receive in the mail after your visit with us. Thank you!             Your Updated Medication List - Protect others around you: Learn how to safely use, store and throw away your medicines at www.disposemymeds.org.          This list is accurate as of 7/25/18  9:07 AM.  Always use your most recent med list.                   Brand Name Dispense Instructions for use Diagnosis    atorvastatin 40 MG tablet    LIPITOR     Take 40 mg by mouth daily        KWADWO CONTOUR test strip   Generic drug:  blood glucose monitoring      1 strip by In Vitro route daily as needed        Calcium + D3 600-200 MG-UNIT Tabs      Take 1 tablet by mouth 2 times daily        divalproex sodium extended-release 500 MG 24 hr tablet    DEPAKOTE ER     Take 1,000 mg by mouth At Bedtime        IBUPROFEN PO      Take 200 mg by mouth daily as needed for moderate pain        levothyroxine 75 MCG tablet    SYNTHROID/LEVOTHROID     Take 75 mcg by mouth daily        omeprazole 40 MG capsule    priLOSEC     Take 40 mg by mouth daily        * QUEtiapine 400 MG tablet    SEROquel     Take 400 mg by mouth daily Take along with 50 mg tab=450 mg daily        * QUEtiapine 50 MG tablet    SEROquel     Take 50 mg by mouth daily Take  along with 400 mg tab=450 mg daily. Can take an extra 50 mg tab as needed        topiramate 50 MG tablet    TOPAMAX     Take 150 mg by mouth At Bedtime        vitamin D 2000 units tablet      Take 2,000 Units by mouth daily        * Notice:  This list has 2 medication(s) that are the same as other medications prescribed for you. Read the directions carefully, and ask your doctor or other care provider to review them with you.

## 2018-07-25 NOTE — TELEPHONE ENCOUNTER
See MTM note from today, patient needs a refill.  Based on previous labs and symptoms I would continue this dose and have VPA monitored in a few months when she sees psychiatry.  MTM does not have CPA to refill this medications.    Thanks!

## 2018-07-26 RX ORDER — DIVALPROEX SODIUM 500 MG/1
1000 TABLET, EXTENDED RELEASE ORAL AT BEDTIME
Qty: 60 TABLET | Refills: 2 | Status: SHIPPED | OUTPATIENT
Start: 2018-07-26

## 2018-07-26 NOTE — PROGRESS NOTES
Clinic Care Coordination Contact  Care Team Conversations    Spoke with [facility] and pt's care will continue to be managed by [facility] until 9/1/18. Should pt and family have any questions about services until that time they will need to contact the [place] CASON-[phone] to discuss. E-mail sent to pt's guardian with this information. [facility] will follow-up in 4 weeks.    MARIBELL Vora  Clinic Care Coordinator  [phone]  [email or web address]

## 2018-07-29 ENCOUNTER — DOCUMENTATION ONLY (OUTPATIENT)
Dept: OTHER | Facility: CLINIC | Age: 53
End: 2018-07-29

## 2018-09-24 ENCOUNTER — PATIENT OUTREACH (OUTPATIENT)
Dept: CARE COORDINATION | Facility: CLINIC | Age: 53
End: 2018-09-24

## 2018-09-24 NOTE — LETTER
[place] CARE COORDINATION    September 26, 2018    Selma Poon     On Behalf of    Jae Ramos  [address] [place] [address]    Dear Jae,    I am a clinic care coordinator who works with Charmaine Velez MD. I recently tried to call and was unable to reach you. I wanted to follow up with you on the resources we sent to you as well as the psychiatry appointment that we helped you set up. Included is a flyer with a description of clinic care coordination and how I can further assist you.     Please feel free to contact me at [phone], with any questions or concerns.     Sincerely,     Sho Magallanes

## 2018-09-24 NOTE — PROGRESS NOTES
Clinic Care Coordination Contact  [facility]/Voicemail    Clinical Data: Care Coordinator Outreach  Outreach attempted x 2.  Left message on voicemail with call back information and requested return call. Please see contact log for detailed outreach attempt dates.  Plan: Care Coordinator will mail out care coordination introduction/ unable to contact letter with care coordinator contact information and explanation of care coordination services. Care Coordinator will do no further outreaches at this time.    MARIBELL Vora  Clinic Care Coordinator  [phone]  [email or web address]

## 2018-10-18 ENCOUNTER — ALLIED HEALTH/NURSE VISIT (OUTPATIENT)
Dept: NURSING | Facility: CLINIC | Age: 53
End: 2018-10-18
Payer: COMMERCIAL

## 2018-10-18 DIAGNOSIS — Z79.899 NEED FOR PROPHYLACTIC CHEMOTHERAPY: Primary | ICD-10-CM

## 2018-10-18 DIAGNOSIS — Z13.9 SCREENING FOR CONDITION: Primary | ICD-10-CM

## 2018-10-18 LAB
ALBUMIN SERPL-MCNC: 2.8 G/DL (ref 3.4–5)
ALP SERPL-CCNC: 115 U/L (ref 40–150)
ALT SERPL W P-5'-P-CCNC: 20 U/L (ref 0–50)
AMYLASE SERPL-CCNC: 27 U/L (ref 30–110)
AST SERPL W P-5'-P-CCNC: 17 U/L (ref 0–45)
BASOPHILS # BLD AUTO: 0 10E9/L (ref 0–0.2)
BASOPHILS NFR BLD AUTO: 0.4 %
BILIRUB DIRECT SERPL-MCNC: <0.1 MG/DL (ref 0–0.2)
BILIRUB SERPL-MCNC: 0.2 MG/DL (ref 0.2–1.3)
CHOLEST SERPL-MCNC: 148 MG/DL
DIFFERENTIAL METHOD BLD: ABNORMAL
EOSINOPHIL # BLD AUTO: 0.1 10E9/L (ref 0–0.7)
EOSINOPHIL NFR BLD AUTO: 1.1 %
ERYTHROCYTE [DISTWIDTH] IN BLOOD BY AUTOMATED COUNT: 14.6 % (ref 10–15)
GLUCOSE SERPL-MCNC: 128 MG/DL (ref 70–99)
HBA1C MFR BLD: 6.3 % (ref 0–5.6)
HCT VFR BLD AUTO: 41.1 % (ref 35–47)
HDLC SERPL-MCNC: 36 MG/DL
HGB BLD-MCNC: 12.6 G/DL (ref 11.7–15.7)
LDLC SERPL CALC-MCNC: 53 MG/DL
LYMPHOCYTES # BLD AUTO: 2.8 10E9/L (ref 0.8–5.3)
LYMPHOCYTES NFR BLD AUTO: 38.8 %
MCH RBC QN AUTO: 27.8 PG (ref 26.5–33)
MCHC RBC AUTO-ENTMCNC: 30.7 G/DL (ref 31.5–36.5)
MCV RBC AUTO: 91 FL (ref 78–100)
MONOCYTES # BLD AUTO: 0.7 10E9/L (ref 0–1.3)
MONOCYTES NFR BLD AUTO: 9.8 %
NEUTROPHILS # BLD AUTO: 3.6 10E9/L (ref 1.6–8.3)
NEUTROPHILS NFR BLD AUTO: 49.9 %
NONHDLC SERPL-MCNC: 112 MG/DL
PLATELET # BLD AUTO: 212 10E9/L (ref 150–450)
PROT SERPL-MCNC: 7.8 G/DL (ref 6.8–8.8)
RBC # BLD AUTO: 4.54 10E12/L (ref 3.8–5.2)
TRIGL SERPL-MCNC: 294 MG/DL
VALPROATE SERPL-MCNC: 56 MG/L (ref 50–100)
WBC # BLD AUTO: 7.2 10E9/L (ref 4–11)

## 2018-10-18 PROCEDURE — 85025 COMPLETE CBC W/AUTO DIFF WBC: CPT

## 2018-10-18 PROCEDURE — 80061 LIPID PANEL: CPT

## 2018-10-18 PROCEDURE — 93000 ELECTROCARDIOGRAM COMPLETE: CPT

## 2018-10-18 PROCEDURE — 80164 ASSAY DIPROPYLACETIC ACD TOT: CPT

## 2018-10-18 PROCEDURE — 82150 ASSAY OF AMYLASE: CPT

## 2018-10-18 PROCEDURE — 36415 COLL VENOUS BLD VENIPUNCTURE: CPT

## 2018-10-18 PROCEDURE — 80076 HEPATIC FUNCTION PANEL: CPT

## 2018-10-18 PROCEDURE — 99207 ZZC NO CHARGE NURSE ONLY: CPT

## 2018-10-18 PROCEDURE — 83036 HEMOGLOBIN GLYCOSYLATED A1C: CPT

## 2018-10-18 PROCEDURE — 82947 ASSAY GLUCOSE BLOOD QUANT: CPT

## 2018-10-18 NOTE — MR AVS SNAPSHOT
"              After Visit Summary   10/18/2018    Jae Ramos    MRN: [id number]           Patient Information     Date Of Birth          1965        Visit Information        Provider Department      10/18/2018 8:45 AM [employer]; EA RN [facility]        Today's Diagnoses     Screening for condition    -  1       Follow-ups after your visit        Your next 10 appointments already scheduled     Oct 22, 2018  3:35 PM CDT   PHYSICAL with Rohit Velez MD   [facility] ([facility])    [address]  [address]  [facility] [address] [phone]              Who to contact     If you have questions or need follow up information about today's clinic visit or your schedule please contact [facility] directly at [phone].  Normal or non-critical lab and imaging results will be communicated to you by MyChart, letter or phone within 4 business days after the clinic has received the results. If you do not hear from us within 7 days, please contact the clinic through MyChart or phone. If you have a critical or abnormal lab result, we will notify you by phone as soon as possible.  Submit refill requests through [employer] or call your pharmacy and they will forward the refill request to us. Please allow 3 business days for your refill to be completed.          Additional Information About Your Visit        MyChart Information     [employer] lets you send messages to your doctor, view your test results, renew your prescriptions, schedule appointments and more. To sign up, go to [email or web address] . Click on \"Log in\" on the left side of the screen, which will take you to the Welcome page. Then click on \"Sign up Now\" on the right side of the page.     You will be asked to enter the access code listed below, as well as some personal information. Please follow the directions to create your username and password.     Your access code " is: [email or web address]  Expires: 2019  9:34 AM     Your access code will  in 90 days. If you need help or a new code, please call your [place] clinic or [phone].        Care EveryWhere ID     This is your Care EveryWhere ID. This could be used by other organizations to access your [place] medical records  [id number]         Blood Pressure from Last 3 Encounters:   18 125/78   18 98/60   07/10/18 108/64    Weight from Last 3 Encounters:   18 224 lb (101.6 kg)   18 222 lb 12.8 oz (101.1 kg)   07/10/18 223 lb 1.6 oz (101.2 kg)              We Performed the Following     EKG 12-lead complete w/read - Clinics        Primary Care Provider Office Phone # Fax #    Rohit Velez [phone] [address] [place] [address]        Equal Access to Services     [facility]: Hadii aad ku hadasho Soomaali, waaxda luqadaha, qaybta kaalmada adeegyada, waxay idiin hayaaronn jeanna mckeon . So [facility] [phone].    ATENCIÓN: Si habla español, tiene a sanchez disposición servicios gratuitos de asistencia lingüística. [facility] [phone].    We comply with applicable federal civil rights laws and [place] laws. We do not discriminate on the basis of race, color, national origin, age, disability, sex, sexual orientation, or gender identity.            Thank you!     Thank you for choosing [facility]  for your care. Our goal is always to provide you with excellent care. Hearing back from our patients is one way we can continue to improve our services. Please take a few minutes to complete the written survey that you may receive in the mail after your visit with us. Thank you!             Your Updated Medication List - Protect others around you: Learn how to safely use, store and throw away your medicines at www.disposemymeds.org.          This list is accurate as of 10/18/18  9:34 AM.  Always use your most recent med list.                    Brand Name Dispense Instructions for use Diagnosis    atorvastatin 40 MG tablet    LIPITOR     Take 40 mg by mouth daily        KWADWO CONTOUR test strip   Generic drug:  blood glucose monitoring      1 strip by In Vitro route daily as needed        Calcium + D3 600-200 MG-UNIT Tabs      Take 1 tablet by mouth 2 times daily        divalproex sodium extended-release 500 MG 24 hr tablet    DEPAKOTE ER    60 tablet    Take 2 tablets (1,000 mg) by mouth At Bedtime    Schizoaffective disorder, bipolar type (H)       IBUPROFEN PO      Take 200 mg by mouth daily as needed for moderate pain        levothyroxine 75 MCG tablet    SYNTHROID/LEVOTHROID     Take 75 mcg by mouth daily        omeprazole 40 MG capsule    priLOSEC     Take 40 mg by mouth daily        * QUEtiapine 400 MG tablet    SEROquel     Take 400 mg by mouth daily Take along with 50 mg tab=450 mg daily        * QUEtiapine 50 MG tablet    SEROquel     Take 50 mg by mouth daily Take along with 400 mg tab=450 mg daily. Can take an extra 50 mg tab as needed        topiramate 50 MG tablet    TOPAMAX     Take 150 mg by mouth At Bedtime        vitamin D 2000 units tablet     30 tablet    Take 2,000 Units by mouth daily    Vitamin D deficiency       * Notice:  This list has 2 medication(s) that are the same as other medications prescribed for you. Read the directions carefully, and ask your doctor or other care provider to review them with you.

## 2018-10-18 NOTE — PROGRESS NOTES
Patient had outside orders for an EKG.     From [facility] Dr. Lily Marinelli.   Fax- [phone]      Sent EKG to above      Linda Mcqueen MA

## 2018-10-22 ENCOUNTER — OFFICE VISIT (OUTPATIENT)
Dept: PEDIATRICS | Facility: CLINIC | Age: 53
End: 2018-10-22
Payer: COMMERCIAL

## 2018-10-22 ENCOUNTER — OFFICE VISIT (OUTPATIENT)
Dept: OPTOMETRY | Facility: CLINIC | Age: 53
End: 2018-10-22
Payer: COMMERCIAL

## 2018-10-22 VITALS
WEIGHT: 229.4 LBS | HEART RATE: 125 BPM | TEMPERATURE: 97.6 F | DIASTOLIC BLOOD PRESSURE: 85 MMHG | SYSTOLIC BLOOD PRESSURE: 122 MMHG | BODY MASS INDEX: 35.93 KG/M2

## 2018-10-22 DIAGNOSIS — E11.8 TYPE 2 DIABETES MELLITUS WITH COMPLICATION, WITHOUT LONG-TERM CURRENT USE OF INSULIN (H): ICD-10-CM

## 2018-10-22 DIAGNOSIS — Z12.31 VISIT FOR SCREENING MAMMOGRAM: ICD-10-CM

## 2018-10-22 DIAGNOSIS — K21.9 GASTROESOPHAGEAL REFLUX DISEASE WITHOUT ESOPHAGITIS: ICD-10-CM

## 2018-10-22 DIAGNOSIS — E03.9 ACQUIRED HYPOTHYROIDISM: ICD-10-CM

## 2018-10-22 DIAGNOSIS — H04.129 DRY EYE: ICD-10-CM

## 2018-10-22 DIAGNOSIS — Z12.11 SCREEN FOR COLON CANCER: ICD-10-CM

## 2018-10-22 DIAGNOSIS — H52.03 HYPEROPIA OF BOTH EYES WITH ASTIGMATISM: ICD-10-CM

## 2018-10-22 DIAGNOSIS — Z23 NEED FOR PROPHYLACTIC VACCINATION WITH TETANUS-DIPHTHERIA (TD): ICD-10-CM

## 2018-10-22 DIAGNOSIS — Z00.00 ROUTINE HISTORY AND PHYSICAL EXAMINATION OF ADULT: Primary | ICD-10-CM

## 2018-10-22 DIAGNOSIS — Z11.59 NEED FOR HEPATITIS C SCREENING TEST: ICD-10-CM

## 2018-10-22 DIAGNOSIS — H52.4 PRESBYOPIA: ICD-10-CM

## 2018-10-22 DIAGNOSIS — E78.00 HYPERCHOLESTEREMIA: ICD-10-CM

## 2018-10-22 DIAGNOSIS — Z11.4 SCREENING FOR HIV (HUMAN IMMUNODEFICIENCY VIRUS): ICD-10-CM

## 2018-10-22 DIAGNOSIS — H25.13 NUCLEAR SCLEROSIS OF BOTH EYES: Primary | ICD-10-CM

## 2018-10-22 DIAGNOSIS — H52.203 HYPEROPIA OF BOTH EYES WITH ASTIGMATISM: ICD-10-CM

## 2018-10-22 DIAGNOSIS — Z23 NEED FOR PROPHYLACTIC VACCINATION AND INOCULATION AGAINST INFLUENZA: ICD-10-CM

## 2018-10-22 DIAGNOSIS — F25.0 SCHIZOAFFECTIVE DISORDER, BIPOLAR TYPE (H): ICD-10-CM

## 2018-10-22 DIAGNOSIS — E55.9 VITAMIN D DEFICIENCY: ICD-10-CM

## 2018-10-22 DIAGNOSIS — E66.01 MORBID OBESITY (H): ICD-10-CM

## 2018-10-22 PROCEDURE — 87389 HIV-1 AG W/HIV-1&-2 AB AG IA: CPT | Performed by: INTERNAL MEDICINE

## 2018-10-22 PROCEDURE — 90471 IMMUNIZATION ADMIN: CPT | Performed by: INTERNAL MEDICINE

## 2018-10-22 PROCEDURE — 92004 COMPRE OPH EXAM NEW PT 1/>: CPT | Performed by: OPTOMETRIST

## 2018-10-22 PROCEDURE — 86803 HEPATITIS C AB TEST: CPT | Performed by: INTERNAL MEDICINE

## 2018-10-22 PROCEDURE — 90682 RIV4 VACC RECOMBINANT DNA IM: CPT | Performed by: INTERNAL MEDICINE

## 2018-10-22 PROCEDURE — 82043 UR ALBUMIN QUANTITATIVE: CPT | Performed by: INTERNAL MEDICINE

## 2018-10-22 PROCEDURE — 36415 COLL VENOUS BLD VENIPUNCTURE: CPT | Performed by: INTERNAL MEDICINE

## 2018-10-22 PROCEDURE — 84443 ASSAY THYROID STIM HORMONE: CPT | Performed by: INTERNAL MEDICINE

## 2018-10-22 PROCEDURE — 92015 DETERMINE REFRACTIVE STATE: CPT | Performed by: OPTOMETRIST

## 2018-10-22 PROCEDURE — 99396 PREV VISIT EST AGE 40-64: CPT | Mod: 25 | Performed by: INTERNAL MEDICINE

## 2018-10-22 PROCEDURE — 80048 BASIC METABOLIC PNL TOTAL CA: CPT | Performed by: INTERNAL MEDICINE

## 2018-10-22 RX ORDER — CHOLECALCIFEROL (VITAMIN D3) 50 MCG
2000 TABLET ORAL DAILY
Qty: 90 TABLET | Refills: 11 | Status: SHIPPED | OUTPATIENT
Start: 2018-10-22 | End: 2020-02-05

## 2018-10-22 RX ORDER — OMEPRAZOLE 40 MG/1
40 CAPSULE, DELAYED RELEASE ORAL DAILY
Qty: 90 CAPSULE | Refills: 11 | Status: SHIPPED | OUTPATIENT
Start: 2018-10-22 | End: 2020-02-05

## 2018-10-22 RX ORDER — LEVOTHYROXINE SODIUM 75 UG/1
75 TABLET ORAL DAILY
Qty: 90 TABLET | Refills: 11 | Status: SHIPPED | OUTPATIENT
Start: 2018-10-22 | End: 2019-11-10

## 2018-10-22 RX ORDER — ATORVASTATIN CALCIUM 40 MG/1
40 TABLET, FILM COATED ORAL DAILY
Qty: 90 TABLET | Refills: 11 | Status: SHIPPED | OUTPATIENT
Start: 2018-10-22 | End: 2020-02-05

## 2018-10-22 ASSESSMENT — ENCOUNTER SYMPTOMS
HEADACHES: 0
DIARRHEA: 0
PALPITATIONS: 0
NAUSEA: 1
COUGH: 0
WEAKNESS: 1
FEVER: 0
NERVOUS/ANXIOUS: 1
SHORTNESS OF BREATH: 0
CHILLS: 0
CONSTIPATION: 0
DYSURIA: 0
JOINT SWELLING: 1
DIZZINESS: 1
HEMATURIA: 0
SORE THROAT: 0
ABDOMINAL PAIN: 0
ARTHRALGIAS: 1
EYE PAIN: 0
HEMATOCHEZIA: 0
HEARTBURN: 1
BREAST MASS: 0
MYALGIAS: 1
FREQUENCY: 1

## 2018-10-22 ASSESSMENT — TONOMETRY
OD_IOP_MMHG: 16
IOP_METHOD: APPLANATION
OS_IOP_MMHG: 16

## 2018-10-22 ASSESSMENT — REFRACTION_MANIFEST
OD_CYLINDER: +0.50
METHOD_AUTOREFRACTION: 1
OD_AXIS: 020
OD_SPHERE: +1.25
OD_SPHERE: +1.25
OS_CYLINDER: +0.50
OS_SPHERE: +0.75
OS_CYLINDER: +1.25
OS_SPHERE: +0.75
OS_AXIS: 015
OS_AXIS: 015
OD_CYLINDER: +1.00
OD_AXIS: 018

## 2018-10-22 ASSESSMENT — CUP TO DISC RATIO
OD_RATIO: 0.3
OS_RATIO: 0.2

## 2018-10-22 ASSESSMENT — EXTERNAL EXAM - RIGHT EYE: OD_EXAM: NORMAL

## 2018-10-22 ASSESSMENT — PATIENT HEALTH QUESTIONNAIRE - PHQ9
SUM OF ALL RESPONSES TO PHQ QUESTIONS 1-9: 25
10. IF YOU CHECKED OFF ANY PROBLEMS, HOW DIFFICULT HAVE THESE PROBLEMS MADE IT FOR YOU TO DO YOUR WORK, TAKE CARE OF THINGS AT HOME, OR GET ALONG WITH OTHER PEOPLE: EXTREMELY DIFFICULT
SUM OF ALL RESPONSES TO PHQ QUESTIONS 1-9: 25

## 2018-10-22 ASSESSMENT — CONF VISUAL FIELD: COMMENTS: UNABLE

## 2018-10-22 ASSESSMENT — VISUAL ACUITY
OD_SC: UNABLE
OD_SC: UNABLE
OS_SC: UNABLE
OS_SC: UMABLE
METHOD: SNELLEN - LINEAR

## 2018-10-22 ASSESSMENT — SLIT LAMP EXAM - LIDS: COMMENTS: NORMAL

## 2018-10-22 ASSESSMENT — EXTERNAL EXAM - LEFT EYE: OS_EXAM: NORMAL

## 2018-10-22 NOTE — MR AVS SNAPSHOT
After Visit Summary   10/22/2018    Jae Ramos    MRN: [id number]           Patient Information     Date Of Birth          1965        Visit Information        Provider Department      10/22/2018 1:45 PM Sonal Gonzalez, OD; [employer] [facility]        Today's Diagnoses     Nuclear sclerosis of both eyes    -  1    Hyperopia of both eyes with astigmatism        Presbyopia        Dry eye          Care Instructions    Optional prescription for glasses    Cataracts  Monitor in one year    I recommend using artificial tears for your dry eye. There are over the counter drops that work well and may be used up to 4 x daily. ( systane , refresh, thera tears) If you need more than 4 drops daily, use a preservative free product which come in individual vials and may be used for 24 hours until finished and discarded.           Follow-ups after your visit        Follow-up notes from your care team     Return in about 1 year (around 10/22/2019).      Who to contact     If you have questions or need follow up information about today's clinic visit or your schedule please contact [facility] directly at [phone].  Normal or non-critical lab and imaging results will be communicated to you by [employer], letter or phone within 4 business days after the clinic has received the results. If you do not hear from us within 7 days, please contact the clinic through [employer] or phone. If you have a critical or abnormal lab result, we will notify you by phone as soon as possible.  Submit refill requests through [employer] or call your pharmacy and they will forward the refill request to us. Please allow 3 business days for your refill to be completed.          Additional Information About Your Visit        MyChart Information     [employer] lets you send messages to your doctor, view your test results, renew your prescriptions, schedule appointments and more. To sign up, go  "to [email or web address] . Click on \"Log in\" on the left side of the screen, which will take you to the Welcome page. Then click on \"Sign up Now\" on the right side of the page.     You will be asked to enter the access code listed below, as well as some personal information. Please follow the directions to create your username and password.     Your access code is: [email or web address]  Expires: 2019  9:34 AM     Your access code will  in 90 days. If you need help or a new code, please call your [place] clinic or [phone].        Care EveryWhere ID     This is your Care EveryWhere ID. This could be used by other organizations to access your [place] medical records  [id number]         Blood Pressure from Last 3 Encounters:   18 125/78   18 98/60   07/10/18 108/64    Weight from Last 3 Encounters:   10/22/18 104.1 kg (229 lb 6.4 oz)   18 101.6 kg (224 lb)   18 101.1 kg (222 lb 12.8 oz)              We Performed the Following     EYE EXAM (SIMPLE-NONBILLABLE)     REFRACTION        Primary Care Provider Office Phone # Fax #    Micaeladhaval Devi Velez [phone] [address] [place] [address]        Equal Access to Services     [facility]: Hadii mouna sifuentes hadasho Sock, waaxda luqadaha, qaybta kaalmada logan, sagar mckeon . So [facility] [phone].    ATENCIÓN: Si habla español, tiene a sanchez disposición servicios gratuitos de asistencia lingüística. Llame al [phone].    We comply with applicable federal civil rights laws and [place] laws. We do not discriminate on the basis of race, color, national origin, age, disability, sex, sexual orientation, or gender identity.            Thank you!     Thank you for choosing [facility]  for your care. Our goal is always to provide you with excellent care. Hearing back from our patients is one way we can continue to improve our services. Please take a few minutes " to complete the written survey that you may receive in the mail after your visit with us. Thank you!             Your Updated Medication List - Protect others around you: Learn how to safely use, store and throw away your medicines at www.disposemymeds.org.          This list is accurate as of 10/22/18  4:06 PM.  Always use your most recent med list.                   Brand Name Dispense Instructions for use Diagnosis    atorvastatin 40 MG tablet    LIPITOR     Take 40 mg by mouth daily        KWADWO CONTOUR test strip   Generic drug:  blood glucose monitoring      1 strip by In Vitro route daily as needed        Calcium + D3 600-200 MG-UNIT Tabs      Take 1 tablet by mouth 2 times daily        divalproex sodium extended-release 500 MG 24 hr tablet    DEPAKOTE ER    60 tablet    Take 2 tablets (1,000 mg) by mouth At Bedtime    Schizoaffective disorder, bipolar type (H)       IBUPROFEN PO      Take 200 mg by mouth daily as needed for moderate pain        levothyroxine 75 MCG tablet    SYNTHROID/LEVOTHROID     Take 75 mcg by mouth daily        omeprazole 40 MG capsule    priLOSEC     Take 40 mg by mouth daily        * QUEtiapine 400 MG tablet    SEROquel     Take 400 mg by mouth daily Take along with 50 mg tab=450 mg daily        * QUEtiapine 50 MG tablet    SEROquel     Take 50 mg by mouth daily Take along with 400 mg tab=450 mg daily. Can take an extra 50 mg tab as needed        topiramate 50 MG tablet    TOPAMAX     Take 150 mg by mouth At Bedtime        vitamin D 2000 units tablet     30 tablet    Take 2,000 Units by mouth daily    Vitamin D deficiency       * Notice:  This list has 2 medication(s) that are the same as other medications prescribed for you. Read the directions carefully, and ask your doctor or other care provider to review them with you.

## 2018-10-22 NOTE — MR AVS SNAPSHOT
After Visit Summary   10/22/2018    Jae Ramos    MRN: [id number]           Patient Information     Date Of Birth          1965        Visit Information        Provider Department      10/22/2018 3:35 PM Rohit Velez Mai, MD; [employer] [facility] [place]        Today's Diagnoses     Schizoaffective disorder, bipolar type (H)    -  1    Routine history and physical examination of adult        Screen for colon cancer        Visit for screening mammogram        Screening for malignant neoplasm of cervix        Need for hepatitis C screening test        Screening for HIV (human immunodeficiency virus)        Need for prophylactic vaccination against Streptococcus pneumoniae (pneumococcus)        Need for prophylactic vaccination with tetanus-diphtheria (Td)        Vitamin D deficiency        Morbid obesity (H)        Gastroesophageal reflux disease without esophagitis        Type 2 diabetes mellitus with complication, without long-term current use of insulin (H)        Acquired hypothyroidism        Hypercholesteremia          Care Instructions      Preventive Health Recommendations  Female Ages 50 - 64    Yearly exam: See your health care provider every year in order to  o Review health changes.   o Discuss preventive care.    o Review your medicines if your doctor has prescribed any.      Get a Pap test every three years (unless you have an abnormal result and your provider advises testing more often).    If you get Pap tests with HPV test, you only need to test every 5 years, unless you have an abnormal result.     You do not need a Pap test if your uterus was removed (hysterectomy) and you have not had cancer.    You should be tested each year for STDs (sexually transmitted diseases) if you're at risk.     Have a mammogram every 1 to 2 years.    Have a colonoscopy at age 50, or have a yearly FIT test (stool test). These exams screen for colon cancer.      Have a  cholesterol test every 5 years, or more often if advised.    Have a diabetes test (fasting glucose) every three years. If you are at risk for diabetes, you should have this test more often.     If you are at risk for osteoporosis (brittle bone disease), think about having a bone density scan (DEXA).    Shots: Get a flu shot each year. Get a tetanus shot every 10 years.    Nutrition:     Eat at least 5 servings of fruits and vegetables each day.    Eat whole-grain bread, whole-wheat pasta and brown rice instead of white grains and rice.    Get adequate Calcium and Vitamin D.     Lifestyle    Exercise at least 150 minutes a week (30 minutes a day, 5 days a week). This will help you control your weight and prevent disease.    Limit alcohol to one drink per day.    No smoking.     Wear sunscreen to prevent skin cancer.     See your dentist every six months for an exam and cleaning.    See your eye doctor every 1 to 2 years.            Follow-ups after your visit        Follow-up notes from your care team     Return in about 1 week (around 10/29/2018).      Your next 10 appointments already scheduled     Nov 02, 2018 10:50 AM CDT   Office Visit with Rohit Velez MD   [facility] [place] ([facility])    [address] [place] [address]  [address]  [facility] [address] [phone]           Bring a current list of meds and any records pertaining to this visit. For Physicals, please bring immunization records and any forms needing to be filled out. Please arrive 10 minutes early to complete paperwork.              Future tests that were ordered for you today     Open Future Orders        Priority Expected Expires Ordered    MA SCREENING DIGITAL BILAT - Future  (s+30) Routine  10/22/2019 10/22/2018            Who to contact     If you have questions or need follow up information about today's clinic visit or your schedule please contact [facility] directly at [phone].  Normal or  "non-critical lab and imaging results will be communicated to you by MyChart, letter or phone within 4 business days after the clinic has received the results. If you do not hear from us within 7 days, please contact the clinic through [employer] or phone. If you have a critical or abnormal lab result, we will notify you by phone as soon as possible.  Submit refill requests through [employer] or call your pharmacy and they will forward the refill request to us. Please allow 3 business days for your refill to be completed.          Additional Information About Your Visit        [employer] Information     [employer] lets you send messages to your doctor, view your test results, renew your prescriptions, schedule appointments and more. To sign up, go to [email or web address] . Click on \"Log in\" on the left side of the screen, which will take you to the Welcome page. Then click on \"Sign up Now\" on the right side of the page.     You will be asked to enter the access code listed below, as well as some personal information. Please follow the directions to create your username and password.     Your access code is: [email or web address]  Expires: 2019  9:34 AM     Your access code will  in 90 days. If you need help or a new code, please call your [place] clinic or [phone].        Care EveryWhere ID     This is your Care EveryWhere ID. This could be used by other organizations to access your [place] medical records  [id number]        Your Vitals Were     Pulse Temperature BMI (Body Mass Index)             125 97.6  F (36.4  C) (Tympanic) 35.93 kg/m2          Blood Pressure from Last 3 Encounters:   10/22/18 122/85   18 125/78   18 98/60    Weight from Last 3 Encounters:   10/22/18 229 lb 6.4 oz (104.1 kg)   18 224 lb (101.6 kg)   18 222 lb 12.8 oz (101.1 kg)              We Performed the Following          ADMIN VACCINE, FIRST     Albumin Random Urine Quantitative with Creat Ratio     Basic metabolic " panel  (Ca, Cl, CO2, Creat, Gluc, K, Na, BUN)     Hepatitis C Screen Reflex to HCV RNA Quant and Genotype     HIV Screening     TSH WITH FREE T4 REFLEX          Where to get your medicines      These medications were sent to [facility] [address]   [address] [place] [address] [address]     Phone:  [phone]     atorvastatin 40 MG tablet    levothyroxine 75 MCG tablet    omeprazole 40 MG capsule    vitamin D 2000 units tablet          Primary Care Provider Office Phone # Fax #    Rohit Velez [phone]       [facility] [place] [address]        Equal Access to Services     [facility]: Hadii mouna sifuentes hadasho Soliali, waaxda luqadaha, qaybta kaalmada jeannayada, sagar mckeon . So [facility] [phone].    ATENCIÓN: Si habla español, tiene a sanchez disposición servicios gratuitos de asistencia lingüística. [facility] [phone].    We comply with applicable federal civil rights laws and [place] laws. We do not discriminate on the basis of race, color, national origin, age, disability, sex, sexual orientation, or gender identity.            Thank you!     Thank you for choosing [facility]  for your care. Our goal is always to provide you with excellent care. Hearing back from our patients is one way we can continue to improve our services. Please take a few minutes to complete the written survey that you may receive in the mail after your visit with us. Thank you!             Your Updated Medication List - Protect others around you: Learn how to safely use, store and throw away your medicines at www.disposemymeds.org.          This list is accurate as of 10/22/18  5:01 PM.  Always use your most recent med list.                   Brand Name Dispense Instructions for use Diagnosis    atorvastatin 40 MG tablet    LIPITOR    90 tablet    Take 1 tablet (40 mg) by mouth daily     Hypercholesteremia       KWADWO CONTOUR test strip   Generic drug:  blood glucose monitoring      1 strip by In Vitro route daily as needed        Calcium + D3 600-200 MG-UNIT Tabs      Take 1 tablet by mouth 2 times daily        divalproex sodium extended-release 500 MG 24 hr tablet    DEPAKOTE ER    60 tablet    Take 2 tablets (1,000 mg) by mouth At Bedtime    Schizoaffective disorder, bipolar type (H)       IBUPROFEN PO      Take 200 mg by mouth daily as needed for moderate pain        levothyroxine 75 MCG tablet    SYNTHROID/LEVOTHROID    90 tablet    Take 1 tablet (75 mcg) by mouth daily    Acquired hypothyroidism       omeprazole 40 MG capsule    priLOSEC    90 capsule    Take 1 capsule (40 mg) by mouth daily    Gastroesophageal reflux disease without esophagitis       * QUEtiapine 400 MG tablet    SEROquel     Take 400 mg by mouth daily Take along with 50 mg tab=450 mg daily        * QUEtiapine 50 MG tablet    SEROquel     Take 50 mg by mouth daily Take along with 400 mg tab=450 mg daily. Can take an extra 50 mg tab as needed        topiramate 50 MG tablet    TOPAMAX     Take 150 mg by mouth At Bedtime        vitamin D 2000 units tablet     90 tablet    Take 2,000 Units by mouth daily    Vitamin D deficiency       * Notice:  This list has 2 medication(s) that are the same as other medications prescribed for you. Read the directions carefully, and ask your doctor or other care provider to review them with you.

## 2018-10-22 NOTE — PROGRESS NOTES
Chief Complaint   Patient presents with     COMPREHENSIVE EYE EXAM      Accompanied by  and Accompanied by daughter  Last Eye Exam: 1+ year  Dilated Previously: Yes    What are you currently using to see?  does not use glasses or contacts       Distance Vision Acuity: Satisfied with vision    Near Vision Acuity: Satisfied with vision while reading  unaided    Eye Comfort: itchy and dark around the eyes  Do you use eye drops? : No  Occupation or Hobbies: none    Lita Bucio [employer] Assistant, A[employer]      Had glasses 6 years ago and does not wear      Medical, surgical and family histories reviewed and updated 10/22/2018.       OBJECTIVE: See Ophthalmology exam    ASSESSMENT:    ICD-10-CM    1. Nuclear sclerosis of both eyes H25.13    2. Hyperopia of both eyes with astigmatism H52.03     H52.203    3. Presbyopia H52.4       The patient was talking to herself the entire exam  Could not give visual acuity with numbers or letters    PLAN:   Her daughter just wants to make sure she does not have anything wrong with her but does not want glasses for her, she will not wear  Artificial tears as needed     Sonal Gonzalez OD

## 2018-10-22 NOTE — LETTER
10/22/2018         RE: Jae Ramos  [address] [place] [address]        Dear Colleague,    Thank you for referring your patient, Jae Ramos, to the [facility]. Please see a copy of my visit note below.    Chief Complaint   Patient presents with     COMPREHENSIVE EYE EXAM      Accompanied by  and Accompanied by daughter  Last Eye Exam: 1+ year  Dilated Previously: Yes    What are you currently using to see?  does not use glasses or contacts       Distance Vision Acuity: Satisfied with vision    Near Vision Acuity: Satisfied with vision while reading  unaided    Eye Comfort: itchy and dark around the eyes  Do you use eye drops? : No  Occupation or Hobbies: none    Lita Bucio Optometric Assistant, A.B.O.C.      Had glasses 6 years ago and does not wear      Medical, surgical and family histories reviewed and updated 10/22/2018.       OBJECTIVE: See Ophthalmology exam    ASSESSMENT:    ICD-10-CM    1. Nuclear sclerosis of both eyes H25.13    2. Hyperopia of both eyes with astigmatism H52.03     H52.203    3. Presbyopia H52.4       The patient was talking to herself the entire exam  Could not give visual acuity with numbers or letters    PLAN:   Her daughter just wants to make sure she does not have anything wrong with her but does not want glasses for her, she will not wear  Artificial tears as needed     Sonal Gonzalez OD     Again, thank you for allowing me to participate in the care of your patient.        Sincerely,        Sonal Gonzalez, OD

## 2018-10-22 NOTE — PROGRESS NOTES
SUBJECTIVE:   CC: Jae Ramos is an 53 year old woman who presents for preventive health visit.     Physical   Annual:     Getting at least 3 servings of Calcium per day:  Yes    Bi-annual eye exam:  Yes    Dental care twice a year:  Yes    Sleep apnea or symptoms of sleep apnea:  Excessive snoring    Diet:  Other    Frequency of exercise:  None    Taking medications regularly:  Yes    Medication side effects:  None    Additional concerns today:  No    Diabetes Follow-up    Patient is checking blood sugars: not at all    Diabetic concerns: None     Symptoms of hypoglycemia (low blood sugar): none     Paresthesias (numbness or burning in feet) or sores: No     Date of last diabetic eye exam: today    BP Readings from Last 2 Encounters:   10/22/18 122/85   07/25/18 125/78     Hemoglobin A1C (%)   Date Value   10/18/2018 6.3 (H)     LDL Cholesterol Calculated (mg/dL)   Date Value   10/18/2018 53       Diabetes Management Resources    Today's PHQ-2 Score:   PHQ-2 ( 1999 Pfizer) 10/22/2018   Q1: Little interest or pleasure in doing things 3   Q2: Feeling down, depressed or hopeless 3   PHQ-2 Score 6   Q1: Little interest or pleasure in doing things Nearly every day   Q2: Feeling down, depressed or hopeless Nearly every day   PHQ-2 Score 6       Abuse: Current or Past(Physical, Sexual or Emotional)- No  Do you feel safe in your environment - Yes    Social History   Substance Use Topics     Smoking status: Never Smoker     Smokeless tobacco: Never Used     Alcohol use No     Alcohol Use 10/22/2018   If you drink alcohol do you typically have greater than 3 drinks per day OR greater than 7 drinks per week? Not Applicable   No flowsheet data found.    Reviewed orders with patient.  Reviewed health maintenance and updated orders accordingly - Yes    Patient over age 50, mutual decision to screen reflected in health maintenance.    Pertinent mammograms are reviewed under the imaging tab.     Reviewed and updated as  needed this visit by clinical staff  Tobacco  Allergies  Meds  Med Hx  Surg Hx  Fam Hx  Soc Hx        Reviewed and updated as needed this visit by Provider         Review of Systems   Constitutional: Negative for chills and fever.   HENT: Negative for congestion, ear pain, hearing loss and sore throat.    Eyes: Negative for pain and visual disturbance.   Respiratory: Negative for cough and shortness of breath.    Cardiovascular: Positive for peripheral edema. Negative for chest pain and palpitations.   Gastrointestinal: Positive for heartburn and nausea. Negative for abdominal pain, constipation, diarrhea and hematochezia.   Breasts:  Negative for tenderness, breast mass and discharge.   Genitourinary: Positive for frequency and urgency. Negative for dysuria, hematuria, pelvic pain, vaginal bleeding and vaginal discharge.   Musculoskeletal: Positive for arthralgias, joint swelling and myalgias.   Skin: Negative for rash.   Neurological: Positive for dizziness and weakness. Negative for headaches.   Psychiatric/Behavioral: Positive for mood changes. The patient is nervous/anxious.         OBJECTIVE:   /85  Pulse 125  Temp 97.6  F (36.4  C) (Tympanic)  Wt 229 lb 6.4 oz (104.1 kg)  BMI 35.93 kg/m2  Physical Exam  GENERAL: obese, alert and no distress, talking to self  EYES: Eyes grossly normal to inspection, PERRL and conjunctivae and sclerae normal  HENT: ear canals and TM's normal, nose and mouth without ulcers or lesions  NECK: no adenopathy, no asymmetry, masses, or scars and thyroid normal to palpation  RESP: lungs clear to auscultation - no rales, rhonchi or wheezes  CV: regular rate and rhythm, normal S1 S2, no S3 or S4, no murmur, click or rub, no peripheral edema and peripheral pulses strong  ABDOMEN: soft, nontender, no hepatosplenomegaly, no masses and bowel sounds normal  MS: no gross musculoskeletal defects noted, no edema  SKIN: no suspicious lesions or rashes  NEURO: Normal strength and  tone, mentation intact and speech normal  PSYCH: mentation appears normal, affect normal/bright    Diagnostic Test Results:  none     ASSESSMENT/PLAN:     1. Routine history and physical examination of adult  Addressed some preventive health today.  Labs today.      Pt to follow-up in 1 week to go over labs and continue preventive health discussion.  Discuss pap and colonoscopy reconciliation at follow-up.  Repeat PHQ-9 (conflicting answers) at follow-up.    2. Schizoaffective disorder, bipolar type (H)  Stable, medications managed by psychiatry ([place]).    3. Screen for colon cancer  To discuss further next week.    4. Visit for screening mammogram  - MA SCREENING DIGITAL BILAT - Future  (s+30); Future    5. Need for hepatitis C screening test  - Hepatitis C Screen Reflex to HCV RNA Quant and Genotype    6. Screening for HIV (human immunodeficiency virus)  - HIV Screening    7. Need for prophylactic vaccination with tetanus-diphtheria (Td)  -      ADMIN VACCINE, FIRST    8. Vitamin D deficiency  Recheck vitamin D.  - Cholecalciferol (VITAMIN D) 2000 units tablet; Take 2,000 Units by mouth daily  Dispense: 90 tablet; Refill: 11    9. Morbid obesity (H)  Counseling on healthy lifestyle modifications.    10. Gastroesophageal reflux disease without esophagitis  Stable, refilled medications.  - omeprazole (PRILOSEC) 40 MG capsule; Take 1 capsule (40 mg) by mouth daily  Dispense: 90 capsule; Refill: 11    11. Type 2 diabetes mellitus with complication, without long-term current use of insulin (H)  Diet controlled.  Due for urine albumin.  Lab Results   Component Value Date    A1C 6.3 10/18/2018     - Basic metabolic panel  (Ca, Cl, CO2, Creat, Gluc, K, Na, BUN)  - Albumin Random Urine Quantitative with Creat Ratio    12. Acquired hypothyroidism  Recheck thyroid levels and refilled medications.  - TSH WITH FREE T4 REFLEX  - levothyroxine (SYNTHROID/LEVOTHROID) 75 MCG tablet; Take 1 tablet (75 mcg) by mouth daily   "Dispense: 90 tablet; Refill: 11    13. Hypercholesteremia  Stable, refilled medications.  - atorvastatin (LIPITOR) 40 MG tablet; Take 1 tablet (40 mg) by mouth daily  Dispense: 90 tablet; Refill: 11    14. Need for prophylactic vaccination and inoculation against influenza  - FLU VACCINE, (RIV4) RECOMBINANT HA  , IM (FluBlok, egg free) [06763]- >18 YRS (FMG recommended  50-64 YRS)  - Vaccine Administration, Initial [38896]        COUNSELING:  Reviewed preventive health counseling, as reflected in patient instructions    BP Readings from Last 1 Encounters:   10/22/18 122/85     Estimated body mass index is 35.93 kg/(m^2) as calculated from the following:    Height as of 7/10/18: 5' 7\" (1.702 m).    Weight as of this encounter: 229 lb 6.4 oz (104.1 kg).      Weight management plan: Discussed healthy diet and exercise guidelines and patient will follow up in 1 month in clinic to re-evaluate.     reports that she has never smoked. She has never used smokeless tobacco.      Counseling Resources:  ATP IV Guidelines  Pooled Cohorts Equation Calculator  Breast Cancer Risk Calculator  FRAX Risk Assessment  ICSI Preventive Guidelines  Dietary Guidelines for Americans, 2010  USDA's MyPlate  ASA Prophylaxis  Lung CA Screening    Charmaine Velez MD  [facility]  "

## 2018-10-22 NOTE — PATIENT INSTRUCTIONS
Optional prescription for glasses    Cataracts  Monitor in one year    I recommend using artificial tears for your dry eye. There are over the counter drops that work well and may be used up to 4 x daily. ( systane , refresh, thera tears) If you need more than 4 drops daily, use a preservative free product which come in individual vials and may be used for 24 hours until finished and discarded.

## 2018-10-22 NOTE — PROGRESS NOTES

## 2018-10-23 LAB
ANION GAP SERPL CALCULATED.3IONS-SCNC: 9 MMOL/L (ref 3–14)
BUN SERPL-MCNC: 11 MG/DL (ref 7–30)
CALCIUM SERPL-MCNC: 9.4 MG/DL (ref 8.5–10.1)
CHLORIDE SERPL-SCNC: 104 MMOL/L (ref 94–109)
CO2 SERPL-SCNC: 23 MMOL/L (ref 20–32)
CREAT SERPL-MCNC: 0.71 MG/DL (ref 0.52–1.04)
CREAT UR-MCNC: 240 MG/DL
GFR SERPL CREATININE-BSD FRML MDRD: 86 ML/MIN/1.7M2
GLUCOSE SERPL-MCNC: 123 MG/DL (ref 70–99)
HCV AB SERPL QL IA: NONREACTIVE
HIV 1+2 AB+HIV1 P24 AG SERPL QL IA: NONREACTIVE
MICROALBUMIN UR-MCNC: 139 MG/L
MICROALBUMIN/CREAT UR: 57.92 MG/G CR (ref 0–25)
POTASSIUM SERPL-SCNC: 4.1 MMOL/L (ref 3.4–5.3)
SODIUM SERPL-SCNC: 136 MMOL/L (ref 133–144)
TSH SERPL DL<=0.005 MIU/L-ACNC: 3.19 MU/L (ref 0.4–4)

## 2018-10-23 ASSESSMENT — PATIENT HEALTH QUESTIONNAIRE - PHQ9: SUM OF ALL RESPONSES TO PHQ QUESTIONS 1-9: 25

## 2018-10-30 ENCOUNTER — RADIANT APPOINTMENT (OUTPATIENT)
Dept: MAMMOGRAPHY | Facility: CLINIC | Age: 53
End: 2018-10-30
Attending: INTERNAL MEDICINE
Payer: COMMERCIAL

## 2018-10-30 DIAGNOSIS — Z12.31 VISIT FOR SCREENING MAMMOGRAM: ICD-10-CM

## 2018-10-30 PROCEDURE — 77067 SCR MAMMO BI INCL CAD: CPT | Mod: TC
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
## 2018-11-02 ENCOUNTER — OFFICE VISIT (OUTPATIENT)
Dept: PEDIATRICS | Facility: CLINIC | Age: 53
End: 2018-11-02
Payer: COMMERCIAL

## 2018-11-02 VITALS
WEIGHT: 226.6 LBS | SYSTOLIC BLOOD PRESSURE: 122 MMHG | TEMPERATURE: 98 F | OXYGEN SATURATION: 94 % | BODY MASS INDEX: 35.56 KG/M2 | HEART RATE: 102 BPM | HEIGHT: 67 IN | DIASTOLIC BLOOD PRESSURE: 68 MMHG

## 2018-11-02 DIAGNOSIS — Z12.11 SPECIAL SCREENING FOR MALIGNANT NEOPLASMS, COLON: Primary | ICD-10-CM

## 2018-11-02 DIAGNOSIS — Z23 NEED FOR 23-POLYVALENT PNEUMOCOCCAL POLYSACCHARIDE VACCINE: ICD-10-CM

## 2018-11-02 PROCEDURE — 90471 IMMUNIZATION ADMIN: CPT | Performed by: INTERNAL MEDICINE

## 2018-11-02 PROCEDURE — 99396 PREV VISIT EST AGE 40-64: CPT | Mod: 25 | Performed by: INTERNAL MEDICINE

## 2018-11-02 PROCEDURE — 90732 PPSV23 VACC 2 YRS+ SUBQ/IM: CPT | Performed by: INTERNAL MEDICINE

## 2018-11-02 NOTE — PATIENT INSTRUCTIONS
Colon cancer screening - FIT test - go downstairs to collect the stool sample kit  Pneumonia vaccine today

## 2018-11-02 NOTE — MR AVS SNAPSHOT
"              After Visit Summary   11/2/2018    Jae Ramos    MRN: [id number]           Patient Information     Date Of Birth          1965        Visit Information        Provider Department      11/2/2018 10:35 AM Rohit Velez Mai, MD; [employer] [facility]        Today's Diagnoses     Special screening for malignant neoplasms, colon    -  1      Care Instructions    Colon cancer screening - FIT test - go downstairs to collect the stool sample kit  Pneumonia vaccine today          Follow-ups after your visit        Follow-up notes from your care team     Return in about 6 months (around 5/2/2019) for f/u diabetes (pre-lab appointment).      Future tests that were ordered for you today     Open Future Orders        Priority Expected Expires Ordered    Fecal colorectal cancer screen (FIT) Routine 11/23/2018 1/25/2019 11/2/2018            Who to contact     If you have questions or need follow up information about today's clinic visit or your schedule please contact [facility] directly at [phone].  Normal or non-critical lab and imaging results will be communicated to you by [employer], letter or phone within 4 business days after the clinic has received the results. If you do not hear from us within 7 days, please contact the clinic through [employer] or phone. If you have a critical or abnormal lab result, we will notify you by phone as soon as possible.  Submit refill requests through [employer] or call your pharmacy and they will forward the refill request to us. Please allow 3 business days for your refill to be completed.          Additional Information About Your Visit        [employer] Information     [employer] lets you send messages to your doctor, view your test results, renew your prescriptions, schedule appointments and more. To sign up, go to [email or web address] . Click on \"Log in\" on the left side of the screen, which will take you to the Welcome page. " "Then click on \"Sign up Now\" on the right side of the page.     You will be asked to enter the access code listed below, as well as some personal information. Please follow the directions to create your username and password.     Your access code is: [email or web address]  Expires: 2019  9:34 AM     Your access code will  in 90 days. If you need help or a new code, please call your [facility] clinic or [phone].        Care EveryWhere ID     This is your Care EveryWhere ID. This could be used by other organizations to access your [facility] medical records  [id number]        Your Vitals Were     Pulse Temperature Height Pulse Oximetry BMI (Body Mass Index)       102 98  F (36.7  C) (Oral) 5' 7\" (1.702 m) 94% 35.49 kg/m2        Blood Pressure from Last 3 Encounters:   18 122/68   10/22/18 122/85   18 125/78    Weight from Last 3 Encounters:   18 226 lb 9.6 oz (102.8 kg)   10/22/18 229 lb 6.4 oz (104.1 kg)   18 224 lb (101.6 kg)               Primary Care Provider Office Phone # Fax #    Rohit Velez [phone] [address] [place] [address]        Equal Access to Services     [facility] AH: Hadii mouna sifuentes hadlizzetteo Sock, waaxda luqadaha, qaybta kaalhien ford, sagar mckeon . So [facility] [phone].    ATENCIÓN: Si habla español, tiene a sanchez disposición servicios gratuitos de asistencia lingüística. Feli al [phone].    We comply with applicable federal civil rights laws and [place] laws. We do not discriminate on the basis of race, color, national origin, age, disability, sex, sexual orientation, or gender identity.            Thank you!     Thank you for choosing [facility]  for your care. Our goal is always to provide you with excellent care. Hearing back from our patients is one way we can continue to improve our services. Please take a few minutes to complete the written survey that you may " receive in the mail after your visit with us. Thank you!             Your Updated Medication List - Protect others around you: Learn how to safely use, store and throw away your medicines at www.disposemymeds.org.          This list is accurate as of 11/2/18 11:24 AM.  Always use your most recent med list.                   Brand Name Dispense Instructions for use Diagnosis    atorvastatin 40 MG tablet    LIPITOR    90 tablet    Take 1 tablet (40 mg) by mouth daily    Hypercholesteremia       KWADWO CONTOUR test strip   Generic drug:  blood glucose monitoring      1 strip by In Vitro route daily as needed        Calcium + D3 600-200 MG-UNIT Tabs      Take 1 tablet by mouth 2 times daily        divalproex sodium extended-release 500 MG 24 hr tablet    DEPAKOTE ER    60 tablet    Take 2 tablets (1,000 mg) by mouth At Bedtime    Schizoaffective disorder, bipolar type (H)       IBUPROFEN PO      Take 200 mg by mouth daily as needed for moderate pain        levothyroxine 75 MCG tablet    SYNTHROID/LEVOTHROID    90 tablet    Take 1 tablet (75 mcg) by mouth daily    Acquired hypothyroidism       omeprazole 40 MG capsule    priLOSEC    90 capsule    Take 1 capsule (40 mg) by mouth daily    Gastroesophageal reflux disease without esophagitis       * QUEtiapine 400 MG tablet    SEROquel     Take 400 mg by mouth daily Take along with 50 mg tab=450 mg daily        * QUEtiapine 50 MG tablet    SEROquel     Take 50 mg by mouth daily Take along with 400 mg tab=450 mg daily. Can take an extra 50 mg tab as needed        topiramate 50 MG tablet    TOPAMAX     Take 150 mg by mouth At Bedtime        vitamin D 2000 units tablet     90 tablet    Take 2,000 Units by mouth daily    Vitamin D deficiency       * Notice:  This list has 2 medication(s) that are the same as other medications prescribed for you. Read the directions carefully, and ask your doctor or other care provider to review them with you.

## 2018-11-07 PROCEDURE — 82274 ASSAY TEST FOR BLOOD FECAL: CPT | Performed by: INTERNAL MEDICINE

## 2018-11-10 LAB — HEMOCCULT STL QL IA: NEGATIVE

## 2018-11-12 DIAGNOSIS — Z12.11 SPECIAL SCREENING FOR MALIGNANT NEOPLASMS, COLON: ICD-10-CM

## 2018-11-19 DIAGNOSIS — F25.0 SCHIZOAFFECTIVE DISORDER, BIPOLAR TYPE (H): ICD-10-CM

## 2018-11-19 RX ORDER — DIVALPROEX SODIUM 500 MG/1
1000 TABLET, EXTENDED RELEASE ORAL AT BEDTIME
Qty: 60 TABLET | Refills: 2 | Status: CANCELLED | OUTPATIENT
Start: 2018-11-19

## 2018-11-19 NOTE — TELEPHONE ENCOUNTER
"Requested Prescriptions   Pending Prescriptions Disp Refills     divalproex sodium extended-release (DEPAKOTE ER) 500 MG 24 hr tablet  Last Written Prescription Date:  07/26/2018  Last Fill Quantity: 60 tablet,  # refills: 2   Last office visit: 11/2/2018 with prescribing provider:  Rohit Velez Mai, MD    Future Office Visit:     60 tablet 2     Sig: Take 2 tablets (1,000 mg) by mouth At Bedtime    Anti-Seizure Meds Protocol  Failed    11/19/2018  5:32 PM       Failed - Review Authorizing provider's last note.     Refer to last progress notes: confirm request is for original authorizing provider (cannot be through other providers).         Passed - Recent (12 mo) or future (30 days) visit within the authorizing provider's specialty    Patient had office visit in the last 12 months or has a visit in the next 30 days with authorizing provider or within the authorizing provider's specialty.  See \"Patient Info\" tab in inbasket, or \"Choose Columns\" in Meds & Orders section of the refill encounter.             Passed - Normal CBC on file in past 26 months    Recent Labs   Lab Test  10/18/18   0809   WBC  7.2   RBC  4.54   HGB  12.6   HCT  41.1   PLT  212                Passed - Normal ALT or AST on file in past 26 months    Recent Labs   Lab Test  10/18/18   0809   ALT  20     Recent Labs   Lab Test  10/18/18   0809   AST  17            Passed - Normal platelet count on file in past 26 months    Recent Labs   Lab Test  10/18/18   0809   PLT  212              Passed - Depakote level within therapeutic range in past 26 months    Lab Results   Component Value Date    BLAINE 56 10/18/2018     Depakote level must be checked 2-4 weeks after dosage change.         Passed - No active pregnancy on record       Passed - No positive pregnancy test in last 12 months          "

## 2018-11-23 NOTE — TELEPHONE ENCOUNTER
Notified pharmacy that this is prescribed by psychiatry.    Per 7/25 per MTM: Schizoaffective disorder bipolar type: Needs Improvement. Patient's Depakote levels in May are slightly below the therapeutic range, but she is not experiencing any side effects and dose was lowered in April due to supra-therapeutic level. [facility] and all values are within normal limits. Patient would benefit from no dose change at this time and would benefit from establishing care with a psychiatrist in the area    This should be being refilled by psychiatry.  per 11/2: Schizoaffective disorder, bipolar type (H)  Stable, medications prescribed by psychiatry.    Pharmacy notified.  Gricelda Galicia RN  Message handled by Nurse Triage.

## 2019-01-24 RX ORDER — TOPIRAMATE 50 MG/1
150 TABLET, FILM COATED ORAL AT BEDTIME
Refills: 5 | Status: CANCELLED | OUTPATIENT
Start: 2019-01-24

## 2019-01-25 NOTE — TELEPHONE ENCOUNTER
"Contacted pharmacy.  Medication is prescribed by outside provider and the pharmacy has a current request out to that provider.      April TAMAYO RN - [facility]      Requested Prescriptions   Pending Prescriptions Disp Refills     topiramate (TOPAMAX) 50 MG tablet  5     Sig: Take 3 tablets (150 mg) by mouth At Bedtime    Anti-Seizure Meds Protocol  Failed - 1/24/2019  4:25 PM       Failed - Review Authorizing provider's last note.     Refer to last progress notes: confirm request is for original authorizing provider (cannot be through other providers).         Passed - Recent (12 mo) or future (30 days) visit within the authorizing provider's specialty    Patient had office visit in the last 12 months or has a visit in the next 30 days with authorizing provider or within the authorizing provider's specialty.  See \"Patient Info\" tab in inbasket, or \"Choose Columns\" in Meds & Orders section of the refill encounter.             Passed - Normal CBC on file in past 26 months    Recent Labs   Lab Test 10/18/18  0809   WBC 7.2   RBC 4.54   HGB 12.6   HCT 41.1                   Passed - Normal ALT or AST on file in past 26 months    Recent Labs   Lab Test 10/18/18  0809   ALT 20     Recent Labs   Lab Test 10/18/18  0809   AST 17            Passed - Normal platelet count on file in past 26 months    Recent Labs   Lab Test 10/18/18  0809                 Passed - Medication is active on med list       Passed - No active pregnancy on record       Passed - No positive pregnancy test in last 12 months          "

## 2019-02-22 RX ORDER — TOPIRAMATE 50 MG/1
150 TABLET, FILM COATED ORAL AT BEDTIME
Refills: 5 | OUTPATIENT
Start: 2019-02-22

## 2019-02-22 NOTE — TELEPHONE ENCOUNTER
Medication not prescribed by PCP.  Advised to contact outside ordering provider.  April TAMAYO RN - Triage  [facility]

## 2019-02-25 ENCOUNTER — OFFICE VISIT (OUTPATIENT)
Dept: OPTOMETRY | Facility: CLINIC | Age: 54
End: 2019-02-25
Payer: COMMERCIAL

## 2019-02-25 DIAGNOSIS — H04.129 DRY EYE: Primary | ICD-10-CM

## 2019-02-25 PROCEDURE — 92012 INTRM OPH EXAM EST PATIENT: CPT | Performed by: OPTOMETRIST

## 2019-02-25 RX ORDER — POLYVINYL ALCOHOL 14 MG/ML
1 SOLUTION/ DROPS OPHTHALMIC 4 TIMES DAILY
Qty: 6 ML | Refills: 12 | Status: SHIPPED | OUTPATIENT
Start: 2019-02-25 | End: 2020-07-28

## 2019-02-25 ASSESSMENT — VISUAL ACUITY: METHOD: SNELLEN - LINEAR

## 2019-02-25 ASSESSMENT — EXTERNAL EXAM - RIGHT EYE: OD_EXAM: NORMAL

## 2019-02-25 ASSESSMENT — SLIT LAMP EXAM - LIDS: COMMENTS: THIN STRIP

## 2019-02-25 ASSESSMENT — EXTERNAL EXAM - LEFT EYE: OS_EXAM: NORMAL

## 2019-02-25 NOTE — LETTER
2/25/2019         RE: Jae Ramos  [address] [place] [address]        Dear Colleague,    Thank you for referring your patient, Jae Ramos, to the [facility]. Please see a copy of my visit note below.    Chief Complaint   Patient presents with     Eye Pain       Patient is here with  and friend. They both have stated getting information from patient may be difficult. She is talking to herself a lot. She is mentally disabled and has difficulty communicating/ poor historian.  She stays in a room where the lights are on 24/7, even at night.    Friend noticed that patients left eye has been red and irritated for 2 weeks, is having watering and discomfort.    Also states right eye is a little irritated.    In no apparent distress     Sonal Gonzalez, OD     See Review Of Systems       Medical, surgical and family histories reviewed and updated 2/25/2019.         OBJECTIVE: See Ophthalmology exam    ASSESSMENT:    ICD-10-CM    1. Dry eye H04.129 polyvinyl alcohol (ARTIFICIAL TEARS) 1.4 % ophthalmic solution    questionable history, pain perception    PLAN:  Blink instilled in eyes gave relief  Prescription artificial tears  Three times daily to four times daily     Sonal Gonzalez OD     Again, thank you for allowing me to participate in the care of your patient.        Sincerely,        Sonal Gonzalez, OD

## 2019-02-25 NOTE — PROGRESS NOTES
Chief Complaint   Patient presents with     Eye Pain       Patient is here with  and friend. They both have stated getting information from patient may be difficult. She is talking to herself a lot. She is mentally disabled and has difficulty communicating/ poor historian.  She stays in a room where the lights are on 24/7, even at night.    Friend noticed that patients left eye has been red and irritated for 2 weeks, is having watering and discomfort.    Also states right eye is a little irritated.    In no apparent distress     Sonal Gonzalez, OD     See Review Of Systems       Medical, surgical and family histories reviewed and updated 2/25/2019.         OBJECTIVE: See Ophthalmology exam    ASSESSMENT:    ICD-10-CM    1. Dry eye H04.129 polyvinyl alcohol (ARTIFICIAL TEARS) 1.4 % ophthalmic solution    questionable history, pain perception    PLAN:  Blink instilled in eyes gave relief  Prescription artificial tears  Three times daily to four times daily     Sonal Gonzalez OD

## 2019-02-25 NOTE — PATIENT INSTRUCTIONS
DRY EYE TREATMENT    I recommend using artificial tears for your dry eye. There are over the counter drops that work well and may be used up to 4x daily. ( systane balance, refresh optive, soothe xp)   If you need more than 4 drops daily, use a preservative free product which come in individual vials which may be used for 24 hours and discarded.

## 2019-04-05 ENCOUNTER — TELEPHONE (OUTPATIENT)
Dept: PEDIATRICS | Facility: CLINIC | Age: 54
End: 2019-04-05

## 2019-04-05 ENCOUNTER — OFFICE VISIT (OUTPATIENT)
Dept: URGENT CARE | Facility: URGENT CARE | Age: 54
End: 2019-04-05
Payer: COMMERCIAL

## 2019-04-05 ENCOUNTER — ANCILLARY PROCEDURE (OUTPATIENT)
Dept: GENERAL RADIOLOGY | Facility: CLINIC | Age: 54
End: 2019-04-05
Attending: FAMILY MEDICINE
Payer: COMMERCIAL

## 2019-04-05 VITALS
OXYGEN SATURATION: 95 % | WEIGHT: 238 LBS | RESPIRATION RATE: 28 BRPM | TEMPERATURE: 99.8 F | BODY MASS INDEX: 37.28 KG/M2 | HEART RATE: 117 BPM | DIASTOLIC BLOOD PRESSURE: 78 MMHG | SYSTOLIC BLOOD PRESSURE: 123 MMHG

## 2019-04-05 DIAGNOSIS — R06.00 DYSPNEA, UNSPECIFIED TYPE: ICD-10-CM

## 2019-04-05 DIAGNOSIS — J18.9 PNEUMONIA OF RIGHT LOWER LOBE DUE TO INFECTIOUS ORGANISM: Primary | ICD-10-CM

## 2019-04-05 DIAGNOSIS — R05.9 COUGH: ICD-10-CM

## 2019-04-05 PROCEDURE — 99214 OFFICE O/P EST MOD 30 MIN: CPT | Performed by: FAMILY MEDICINE

## 2019-04-05 PROCEDURE — 71046 X-RAY EXAM CHEST 2 VIEWS: CPT

## 2019-04-05 RX ORDER — DOXYCYCLINE 100 MG/1
100 CAPSULE ORAL 2 TIMES DAILY
Qty: 20 CAPSULE | Refills: 0 | Status: SHIPPED | OUTPATIENT
Start: 2019-04-05 | End: 2019-06-27

## 2019-04-05 RX ORDER — BENZONATATE 100 MG/1
200 CAPSULE ORAL 3 TIMES DAILY PRN
Qty: 42 CAPSULE | Refills: 3 | Status: SHIPPED | OUTPATIENT
Start: 2019-04-05 | End: 2019-06-27

## 2019-04-05 NOTE — PROGRESS NOTES
NOTE:  Patient speaks Niuean and no English.  History was obtained via her daughter.        SUBJECTIVE:   Jae Ramos is a 54 year old female with a history of Schizoaffective Disorder (Bipolar Type) and GERD. Patient is here with her daughter.  The pain is presenting with a chief complaint of cough (nonproductive, interfering with sleep) and shortness of breath (tightness in the chest?).  Onset of symptoms was three days ago.  Course of illness is worsening.  .    Severity severe.    Current and Associated symptoms: as listed above.  She has had a stuffy nose.    Treatment measures tried include none.  .  Predisposing factors include family members had influenza about three weeks ago.  No sick contacts with recent coughing.  .    Past Medical History:   Diagnosis Date     Hypothyroidism      Current Outpatient Medications   Medication Sig Dispense Refill     atorvastatin (LIPITOR) 40 MG tablet Take 1 tablet (40 mg) by mouth daily 90 tablet 11     KWADWO CONTOUR test strip 1 strip by In Vitro route daily as needed        Calcium Carb-Cholecalciferol (CALCIUM + D3) 600-200 MG-UNIT TABS Take 1 tablet by mouth 2 times daily       Cholecalciferol (VITAMIN D) 2000 units tablet Take 2,000 Units by mouth daily 90 tablet 11     divalproex sodium extended-release (DEPAKOTE ER) 500 MG 24 hr tablet Take 2 tablets (1,000 mg) by mouth At Bedtime 60 tablet 2     IBUPROFEN PO Take 200 mg by mouth daily as needed for moderate pain       levothyroxine (SYNTHROID/LEVOTHROID) 75 MCG tablet Take 1 tablet (75 mcg) by mouth daily 90 tablet 11     omeprazole (PRILOSEC) 40 MG capsule Take 1 capsule (40 mg) by mouth daily 90 capsule 11     polyvinyl alcohol (ARTIFICIAL TEARS) 1.4 % ophthalmic solution Place 1 drop into both eyes 4 times daily 6 mL 12     QUEtiapine (SEROQUEL) 400 MG tablet Take 400 mg by mouth daily Take along with 50 mg tab=450 mg daily  5     QUEtiapine (SEROQUEL) 50 MG tablet Take 50 mg by mouth daily Take along with  400 mg tab=450 mg daily. Can take an extra 50 mg tab as needed  5     topiramate (TOPAMAX) 50 MG tablet Take 150 mg by mouth At Bedtime   5     Social History     Tobacco Use     Smoking status: Never Smoker     Smokeless tobacco: Never Used   Substance Use Topics     Alcohol use: No       ROS:  CONSTITUTIONAL:NEGATIVE for fever, chills, change in weight  INTEGUMENTARY/SKIN: NEGATIVE for worrisome rashes, moles or lesions  EYES: NEGATIVE for vision changes or irritation  ENT/MOUTH: POSITIVE for stuffy nose.    RESP:POSITIVE for cough and shortness of breath.    CV: NEGATIVE for chest pain, palpitations or peripheral edema  GI: NEGATIVE for nausea, abdominal pain, heartburn, or change in bowel habits  : no urinary problems.     OBJECTIVE:  /78   Pulse 117   Temp 99.8  F (37.7  C) (Tympanic)   Resp 28   Wt 108 kg (238 lb)   SpO2 95%   BMI 37.28 kg/m    GENERAL APPEARANCE: healthy, alert and no distress.  Patient is mouth breathing.  No acute respiratory distress.  She is constantly talking without getting short of breath.      HENT: nasal turbinates erythematous, swollen and oral mucous membranes moist, no erythema noted  NECK: supple, nontender, no lymphadenopathy  RESP: lungs clear to auscultation - no rales, rhonchi or wheezes  CV: tachycardic.  No murmurs/rubs/gallops.    SKIN: no suspicious lesions or rashes    chest x-ray:  I viewed the X-ray images during this clinic encounter . The X-rays showed possible infiltrates at the right lower lobe.      ASSESSMENT:  Cough  Dyspnea   Possible Right Lower Lobe Pneumonia    PLAN:  Rx:  Doxycycline  Rx:  Tessalon Perles  Go to the emergency room if there is worsening, severe shortness of breath  follow up with her primary care provider if not better in 5-7 days.   Humidifier      Javed Kenyon MD

## 2019-04-05 NOTE — TELEPHONE ENCOUNTER
Patient daughter calling to schedule appointment for mom to be seen today.    States that mom is really sick with a cough and having difficulties breathing.  Reports that she just sleeps all day because she says it is hard to breath.    Notes that mom is not with her so was difficult to triage.  Noted mom should be seen today for concerns daughter noted.  Daughter asking for appointment for Monday.  Advised she have evaluation in [facility] and not to wait to be seen if her mom is having difficulties breathing.    She did agree to bring her to .  April TAMAYO RN - Triage  [facility]

## 2019-04-05 NOTE — PATIENT INSTRUCTIONS
Humidifier for the nose and for the lungs.      follow up with the primary care provider if not better in 5-7 days.     Go to the emergency room if there is worsening, severe shortness of breath    Do not take Calcium pills while on the Doxycycline antibiotic.

## 2019-06-27 ENCOUNTER — OFFICE VISIT (OUTPATIENT)
Dept: PEDIATRICS | Facility: CLINIC | Age: 54
End: 2019-06-27
Payer: COMMERCIAL

## 2019-06-27 VITALS
DIASTOLIC BLOOD PRESSURE: 70 MMHG | BODY MASS INDEX: 38.16 KG/M2 | OXYGEN SATURATION: 95 % | HEART RATE: 113 BPM | SYSTOLIC BLOOD PRESSURE: 116 MMHG | WEIGHT: 243.1 LBS | HEIGHT: 67 IN

## 2019-06-27 DIAGNOSIS — E11.8 TYPE 2 DIABETES MELLITUS WITH COMPLICATION, WITHOUT LONG-TERM CURRENT USE OF INSULIN (H): ICD-10-CM

## 2019-06-27 DIAGNOSIS — F25.0 SCHIZOAFFECTIVE DISORDER, BIPOLAR TYPE (H): ICD-10-CM

## 2019-06-27 DIAGNOSIS — R63.5 WEIGHT GAIN: Primary | ICD-10-CM

## 2019-06-27 DIAGNOSIS — E03.9 ACQUIRED HYPOTHYROIDISM: ICD-10-CM

## 2019-06-27 LAB — HBA1C MFR BLD: 6.8 % (ref 0–5.6)

## 2019-06-27 PROCEDURE — 99214 OFFICE O/P EST MOD 30 MIN: CPT | Performed by: INTERNAL MEDICINE

## 2019-06-27 PROCEDURE — 83036 HEMOGLOBIN GLYCOSYLATED A1C: CPT | Performed by: INTERNAL MEDICINE

## 2019-06-27 PROCEDURE — 36415 COLL VENOUS BLD VENIPUNCTURE: CPT | Performed by: INTERNAL MEDICINE

## 2019-06-27 PROCEDURE — 80053 COMPREHEN METABOLIC PANEL: CPT | Performed by: INTERNAL MEDICINE

## 2019-06-27 PROCEDURE — 84443 ASSAY THYROID STIM HORMONE: CPT | Performed by: INTERNAL MEDICINE

## 2019-06-27 RX ORDER — HALOPERIDOL 5 MG/1
2.5 TABLET ORAL AT BEDTIME
Refills: 1 | COMMUNITY
Start: 2019-05-25 | End: 2021-09-16

## 2019-06-27 RX ORDER — QUETIAPINE FUMARATE 100 MG/1
100 TABLET, FILM COATED ORAL DAILY
COMMUNITY
Start: 2019-06-27

## 2019-06-27 RX ORDER — LURASIDONE HYDROCHLORIDE 40 MG/1
TABLET, FILM COATED ORAL
COMMUNITY
Start: 2019-04-30 | End: 2020-02-05

## 2019-06-27 RX ORDER — PRAZOSIN HYDROCHLORIDE 1 MG/1
CAPSULE ORAL
COMMUNITY
Start: 2019-02-01

## 2019-06-27 ASSESSMENT — MIFFLIN-ST. JEOR: SCORE: 1729.19

## 2019-06-27 NOTE — PATIENT INSTRUCTIONS
Weight gain:  -- This is likely due to multiple psychiatric medications  -- Will refer for physical therapy exercises    I'd like to recheck some of her labs today as well  I will contact you with results and any changes

## 2019-06-27 NOTE — PROGRESS NOTES
Subjective     Jae Ramos is a 54 year old female who presents to clinic today for the following health issues:    HPI   Concerned about weight gain and would like a referral to PT. Patient is also complaining about aching all over her body which is making it difficult for her to walk.      Amount of exercise or physical activity: None    Problems taking medications regularly: No    Medication side effects: none    Diet: regular (no restrictions)      Patient Active Problem List   Diagnosis     History of behavioral and mental health problems     Acquired hypothyroidism     Hypercholesteremia     Type 2 diabetes mellitus (H)     Gastroesophageal reflux disease without esophagitis     Schizoaffective disorder, bipolar type (H)     Obesity (BMI 35.0-39.9) with comorbidity (H)     History reviewed. No pertinent surgical history.    Social History     Tobacco Use     Smoking status: Never Smoker     Smokeless tobacco: Never Used   Substance Use Topics     Alcohol use: No     Family History   Problem Relation Age of Onset     No Known Problems Mother      No Known Problems Father          Current Outpatient Medications   Medication Sig Dispense Refill     atorvastatin (LIPITOR) 40 MG tablet Take 1 tablet (40 mg) by mouth daily 90 tablet 11     KWADWO CONTOUR test strip 1 strip by In Vitro route daily as needed        Calcium Carb-Cholecalciferol (CALCIUM + D3) 600-200 MG-UNIT TABS Take 1 tablet by mouth 2 times daily       Cholecalciferol (VITAMIN D) 2000 units tablet Take 2,000 Units by mouth daily 90 tablet 11     divalproex sodium extended-release (DEPAKOTE ER) 500 MG 24 hr tablet Take 2 tablets (1,000 mg) by mouth At Bedtime 60 tablet 2     haloperidol (HALDOL) 5 MG tablet Take 2.5 mg by mouth At Bedtime  1     LATUDA 40 MG TABS tablet        levothyroxine (SYNTHROID/LEVOTHROID) 75 MCG tablet Take 1 tablet (75 mcg) by mouth daily 90 tablet 11     omeprazole (PRILOSEC) 40 MG capsule Take 1 capsule (40 mg) by mouth  "daily 90 capsule 11     polyvinyl alcohol (ARTIFICIAL TEARS) 1.4 % ophthalmic solution Place 1 drop into both eyes 4 times daily 6 mL 12     prazosin (MINIPRESS) 1 MG capsule        QUEtiapine (SEROQUEL) 100 MG tablet Take 1 tablet (100 mg) by mouth daily Take along with 400 mg tab=450 mg daily. Can take an extra 50 mg tab as needed       QUEtiapine (SEROQUEL) 400 MG tablet Take 400 mg by mouth daily Take along with 50 mg tab=450 mg daily  5     topiramate (TOPAMAX) 50 MG tablet Take 150 mg by mouth At Bedtime   5     IBUPROFEN PO Take 200 mg by mouth daily as needed for moderate pain       No Known Allergies  Recent Labs   Lab Test 10/22/18  1659 10/18/18  0809 04/23/18   A1C  --  6.3*  --    LDL  --  53  --    HDL  --  36*  --    TRIG  --  294*  --    ALT  --  20 9   CR 0.71  --   --    GFRESTIMATED 86  --   --    GFRESTBLACK >90  --   --    POTASSIUM 4.1  --   --    TSH 3.19  --   --       BP Readings from Last 3 Encounters:   06/27/19 116/70   04/05/19 123/78   11/02/18 122/68    Wt Readings from Last 3 Encounters:   06/27/19 110.3 kg (243 lb 1.6 oz)   04/05/19 108 kg (238 lb)   11/02/18 102.8 kg (226 lb 9.6 oz)                    Reviewed and updated as needed this visit by Provider  Tobacco  Allergies  Meds  Problems  Med Hx  Surg Hx  Fam Hx         Review of Systems   ROS COMP: Constitutional, HEENT, cardiovascular, pulmonary, gi and gu systems are negative, except as otherwise noted.      Objective    /70 (BP Location: Right arm, Patient Position: Sitting, Cuff Size: Adult Large)   Pulse 113   Ht 1.7 m (5' 6.93\")   Wt 110.3 kg (243 lb 1.6 oz)   SpO2 95%   BMI 38.16 kg/m    Body mass index is 38.16 kg/m .  Physical Exam   GENERAL: healthy, alert and no distress  EYES: Eyes grossly normal to inspection  NECK: no asymmetry, masses, or scars  MS: no gross musculoskeletal defects noted, no edema  SKIN: no suspicious lesions or rashes  NEURO: mentation intact and speech normal  PSYCH: affect " "normal/bright and pt is mumbling to self, responds to questions appropriately sometimes.  Knee Exam:   Ambulation/gait normal  Normal on inspection.  No deformity, swelling, effusion, ecchymosis.  No quad atrophy.  Non-tender to palpation of the patella and lateral, medial, and posterior joint  Passive and active ROM normal.      Diagnostic Test Results:  Labs reviewed in Epic        Assessment & Plan     1. Weight gain  Daughter having trouble convincing pt to walk on treadmill, which she was able to do before.  She also is working on diet and portion sizes.  Is requesting PT to assist with exercises.  Provided referral.  - [facility]; Future  - Comprehensive metabolic panel    2. Acquired hypothyroidism  Will reassess thyroid levels and make any needed adjustments to synthroid based on weight.  - TSH with free T4 reflex    3. Schizoaffective disorder, bipolar type (H)  Pending records from [facility].  Psychiatry is adjusting neuroleptics - likely cause of weight gain.  - QUEtiapine (SEROQUEL) 100 MG tablet; Take 1 tablet (100 mg) by mouth daily Take along with 400 mg tab=450 mg daily. Can take an extra 50 mg tab as needed    4. Type 2 diabetes mellitus with complication, without long-term current use of insulin (H)  Diet controlled.  Will recheck a1c today.  Discussed likely CDE referral if A1C has increased.  - [facility]; Future  - Hemoglobin A1c     BMI:   Estimated body mass index is 38.16 kg/m  as calculated from the following:    Height as of this encounter: 1.7 m (5' 6.93\").    Weight as of this encounter: 110.3 kg (243 lb 1.6 oz).   Weight management plan: Discussed healthy diet and exercise guidelines        Patient Instructions   Weight gain:  -- This is likely due to multiple psychiatric medications  -- Will refer for physical therapy exercises    I'd like to recheck some of her labs today as well  I will contact you with results and " any changes        Return in about 4 months (around 10/27/2019) for Physical Exam (fasting if possible).    Charmaine Velez MD  [facility]

## 2019-06-27 NOTE — LETTER
[facility]  [address]  [place] [address]                  [phone]   July 1, 2019    Jae Ramos  [facility] [place] [address] [facility] [address] [place] [address]        Dear Jae,     Your A1C (diabetes test) is slightly higher than previous, but still at goal of < 7%.  The remainder of your lab results are normal.  Please feel free to call with any questions.       Sincerely,     Charmaine Velez MD       Results for orders placed or performed in visit on 06/27/19   TSH with free T4 reflex   Result Value Ref Range    TSH 1.98 0.40 - 4.00 mU/L   Hemoglobin A1c   Result Value Ref Range    Hemoglobin A1C 6.8 (H) 0 - 5.6 %   Comprehensive metabolic panel   Result Value Ref Range    Sodium 141 133 - 144 mmol/L    Potassium 4.3 3.4 - 5.3 mmol/L    Chloride 102 94 - 109 mmol/L    Carbon Dioxide 29 20 - 32 mmol/L    Anion Gap 10 3 - 14 mmol/L    Glucose 182 (H) 70 - 99 mg/dL    Urea Nitrogen 8 7 - 30 mg/dL    Creatinine 0.54 0.52 - 1.04 mg/dL    GFR Estimate >90 >60 mL/min/[1.73_m2]    GFR Estimate If Black >90 >60 mL/min/[1.73_m2]    Calcium 9.1 8.5 - 10.1 mg/dL    Bilirubin Total 0.3 0.2 - 1.3 mg/dL    Albumin 3.1 (L) 3.4 - 5.0 g/dL    Protein Total 7.9 6.8 - 8.8 g/dL    Alkaline Phosphatase 117 40 - 150 U/L    ALT 29 0 - 50 U/L    AST 33 0 - 45 U/L

## 2019-06-28 LAB
ALBUMIN SERPL-MCNC: 3.1 G/DL (ref 3.4–5)
ALP SERPL-CCNC: 117 U/L (ref 40–150)
ALT SERPL W P-5'-P-CCNC: 29 U/L (ref 0–50)
ANION GAP SERPL CALCULATED.3IONS-SCNC: 10 MMOL/L (ref 3–14)
AST SERPL W P-5'-P-CCNC: 33 U/L (ref 0–45)
BILIRUB SERPL-MCNC: 0.3 MG/DL (ref 0.2–1.3)
BUN SERPL-MCNC: 8 MG/DL (ref 7–30)
CALCIUM SERPL-MCNC: 9.1 MG/DL (ref 8.5–10.1)
CHLORIDE SERPL-SCNC: 102 MMOL/L (ref 94–109)
CO2 SERPL-SCNC: 29 MMOL/L (ref 20–32)
CREAT SERPL-MCNC: 0.54 MG/DL (ref 0.52–1.04)
GFR SERPL CREATININE-BSD FRML MDRD: >90 ML/MIN/{1.73_M2}
GLUCOSE SERPL-MCNC: 182 MG/DL (ref 70–99)
POTASSIUM SERPL-SCNC: 4.3 MMOL/L (ref 3.4–5.3)
PROT SERPL-MCNC: 7.9 G/DL (ref 6.8–8.8)
SODIUM SERPL-SCNC: 141 MMOL/L (ref 133–144)
TSH SERPL DL<=0.005 MIU/L-ACNC: 1.98 MU/L (ref 0.4–4)

## 2019-07-02 ENCOUNTER — THERAPY VISIT (OUTPATIENT)
Dept: PHYSICAL THERAPY | Facility: CLINIC | Age: 54
End: 2019-07-02
Attending: INTERNAL MEDICINE
Payer: COMMERCIAL

## 2019-07-02 DIAGNOSIS — E11.8 TYPE 2 DIABETES MELLITUS WITH COMPLICATION, WITHOUT LONG-TERM CURRENT USE OF INSULIN (H): ICD-10-CM

## 2019-07-02 DIAGNOSIS — R63.5 WEIGHT GAIN: ICD-10-CM

## 2019-07-02 DIAGNOSIS — M62.81 MUSCLE WEAKNESS (GENERALIZED): ICD-10-CM

## 2019-07-02 PROCEDURE — 97110 THERAPEUTIC EXERCISES: CPT | Mod: GP | Performed by: PHYSICAL THERAPIST

## 2019-07-02 PROCEDURE — 97163 PT EVAL HIGH COMPLEX 45 MIN: CPT | Mod: GP | Performed by: PHYSICAL THERAPIST

## 2019-07-02 PROCEDURE — 97530 THERAPEUTIC ACTIVITIES: CPT | Mod: GP | Performed by: PHYSICAL THERAPIST

## 2019-07-02 NOTE — PROGRESS NOTES
[facility] Initial Evaluation  Subjective:  The history is provided by a caregiver. The history is limited by a language barrier. A  was used.   Jae Ramos being seen for to learn exercises to get stronger.   Problem began 6/27/2019 (MD order written). Where condition occurred: other.Problem occurred: long-standing issue due to mental disability  and reported as 8/10 on pain scale. General health as reported by patient is fair. Pertinent medical history includes:  Mental illness and diabetes.    Surgeries include:  None.  Current medications:  Sleep medication.     Pain is described as aching Pain frequency: unable to assess/understand. Pain timing: worse with activity. Progression since onset: unable to assess/understand.      Patient is none - disabled.     Red flags:  None as reported by patient.  Jae Ramos is a 54 year old female with deconditioning and weakness condition which occurred with .      This is a chronic condition     Patient reports pain:  Left hip, right hip, left knee, right knee, cervical spine, lumbar spine, right shoulder and left shoulder.         Associated symptoms:  Fatigue and loss of strength.   Symptoms are exacerbated by activity and relieved by rest.                      Barriers to activity include unsure how to start (doesn't want to exercise).  Pt has been diagnosed with Type 2 diabetes.                          Objective:  System    Physical Exam        General Evaluation:      Gross Strength:            Upper Extremity:   Significant findings: Grossly 4/5  Lower Extremity:   Significant findings:  B knee ext 4+/5, knee flex 5/5  Core:   Significant findings:  Poor core strength                                                             ROS    Assessment/Plan:    Patient is a 54 year old female with General weakness/pain complaints.    Patient has the following significant findings with corresponding treatment plan.                 Diagnosis 1:  General weakness/pain  Pain -  hot/cold therapy  Decreased strength - therapeutic exercise, therapeutic activities and home program  Decreased function - therapeutic activities and home program  Impaired posture - neuro re-education and home program    Therapy Evaluation Codes:   1) History comprised of:   Personal factors that impact the plan of care:      Anxiety, Cognition, Language, Overall behavior pattern and Time since onset of symptoms.    Comorbidity factors that impact the plan of care are:      Diabetes, Mental illness and Overweight.     Medications impacting care: Sleep.  2) Examination of Body Systems comprised of:   Body structures and functions that impact the plan of care:      Cervical spine, Hip, Knee, Lumbar spine and Shoulder.   Activity limitations that impact the plan of care are:      Lifting, Squatting/kneeling, Stairs, Standing and Walking.  3) Clinical presentation characteristics are:   Unstable/Unpredictable.  4) Decision-Making    High complexity using standardized patient assessment instrument and/or measureable assessment of functional outcome.  Cumulative Therapy Evaluation is: High complexity.    Previous and current functional limitations:  (See Goal Flow Sheet for this information)    Short term and Long term goals: (See Goal Flow Sheet for this information)     Communication ability:  Patient has an  for communication clarity.  Patient is unable to clearly communicate and follow directions.  They will require assistance to perform a home exercise program.  Treatment Explanation - The following has been discussed with the patient:   RX ordered/plan of care  Anticipated outcomes  Possible risks and side effects  This patient would benefit from PT intervention to resume normal activities.   Rehab potential is questionable due to significant co-morbidities and pt's lack of interest in exercise.    Frequency:  2 X a month, once daily  Duration:  for 2  months  Discharge Plan:  Achieve all LTG.  Reach maximal therapeutic benefit.    Please refer to the daily flowsheet for treatment today, total treatment time and time spent performing 1:1 timed codes.

## 2019-07-16 ENCOUNTER — MEDICAL CORRESPONDENCE (OUTPATIENT)
Dept: HEALTH INFORMATION MANAGEMENT | Facility: CLINIC | Age: 54
End: 2019-07-16

## 2019-07-16 ENCOUNTER — TRANSFERRED RECORDS (OUTPATIENT)
Dept: HEALTH INFORMATION MANAGEMENT | Facility: CLINIC | Age: 54
End: 2019-07-16

## 2019-07-16 LAB — PHQ9 SCORE: 10

## 2019-09-23 ENCOUNTER — TRANSFERRED RECORDS (OUTPATIENT)
Dept: HEALTH INFORMATION MANAGEMENT | Facility: CLINIC | Age: 54
End: 2019-09-23

## 2019-09-23 DIAGNOSIS — Z79.899 ENCOUNTER FOR LONG-TERM (CURRENT) USE OF OTHER MEDICATIONS: Primary | ICD-10-CM

## 2019-09-23 LAB
BASOPHILS # BLD AUTO: 0.1 10E9/L (ref 0–0.2)
BASOPHILS NFR BLD AUTO: 0.9 %
DIFFERENTIAL METHOD BLD: ABNORMAL
EOSINOPHIL # BLD AUTO: 0.1 10E9/L (ref 0–0.7)
EOSINOPHIL NFR BLD AUTO: 1.3 %
ERYTHROCYTE [DISTWIDTH] IN BLOOD BY AUTOMATED COUNT: 15.4 % (ref 10–15)
HCT VFR BLD AUTO: 45.8 % (ref 35–47)
HGB BLD-MCNC: 13.8 G/DL (ref 11.7–15.7)
LYMPHOCYTES # BLD AUTO: 2.4 10E9/L (ref 0.8–5.3)
LYMPHOCYTES NFR BLD AUTO: 44 %
MCH RBC QN AUTO: 27.5 PG (ref 26.5–33)
MCHC RBC AUTO-ENTMCNC: 30.1 G/DL (ref 31.5–36.5)
MCV RBC AUTO: 91 FL (ref 78–100)
MONOCYTES # BLD AUTO: 0.7 10E9/L (ref 0–1.3)
MONOCYTES NFR BLD AUTO: 12.3 %
NEUTROPHILS # BLD AUTO: 2.3 10E9/L (ref 1.6–8.3)
NEUTROPHILS NFR BLD AUTO: 41.5 %
PLATELET # BLD AUTO: 212 10E9/L (ref 150–450)
RBC # BLD AUTO: 5.02 10E12/L (ref 3.8–5.2)
WBC # BLD AUTO: 5.5 10E9/L (ref 4–11)

## 2019-09-23 PROCEDURE — 85025 COMPLETE CBC W/AUTO DIFF WBC: CPT | Performed by: PSYCHIATRY & NEUROLOGY

## 2019-09-23 PROCEDURE — 36415 COLL VENOUS BLD VENIPUNCTURE: CPT | Performed by: PSYCHIATRY & NEUROLOGY

## 2019-10-16 PROBLEM — M62.81 MUSCLE WEAKNESS (GENERALIZED): Status: RESOLVED | Noted: 2019-07-02 | Resolved: 2019-10-16

## 2019-10-24 ENCOUNTER — MEDICAL CORRESPONDENCE (OUTPATIENT)
Dept: HEALTH INFORMATION MANAGEMENT | Facility: CLINIC | Age: 54
End: 2019-10-24

## 2019-10-25 DIAGNOSIS — Z79.899 ENCOUNTER FOR LONG-TERM (CURRENT) USE OF OTHER MEDICATIONS: ICD-10-CM

## 2019-10-25 LAB
AMYLASE SERPL-CCNC: 22 U/L (ref 30–110)
BASOPHILS # BLD AUTO: 0.1 10E9/L (ref 0–0.2)
BASOPHILS NFR BLD AUTO: 0.9 %
CHOLEST SERPL-MCNC: 249 MG/DL
DIFFERENTIAL METHOD BLD: ABNORMAL
EOSINOPHIL # BLD AUTO: 0.1 10E9/L (ref 0–0.7)
EOSINOPHIL NFR BLD AUTO: 1.5 %
ERYTHROCYTE [DISTWIDTH] IN BLOOD BY AUTOMATED COUNT: 14.9 % (ref 10–15)
GLUCOSE SERPL-MCNC: 123 MG/DL (ref 70–99)
HBA1C MFR BLD: 6.1 % (ref 0–5.6)
HCT VFR BLD AUTO: 43.8 % (ref 35–47)
HDLC SERPL-MCNC: 35 MG/DL
HGB BLD-MCNC: 13.4 G/DL (ref 11.7–15.7)
LDLC SERPL CALC-MCNC: 142 MG/DL
LYMPHOCYTES # BLD AUTO: 2.7 10E9/L (ref 0.8–5.3)
LYMPHOCYTES NFR BLD AUTO: 50.7 %
MCH RBC QN AUTO: 27.6 PG (ref 26.5–33)
MCHC RBC AUTO-ENTMCNC: 30.6 G/DL (ref 31.5–36.5)
MCV RBC AUTO: 90 FL (ref 78–100)
MONOCYTES # BLD AUTO: 0.5 10E9/L (ref 0–1.3)
MONOCYTES NFR BLD AUTO: 8.9 %
NEUTROPHILS # BLD AUTO: 2 10E9/L (ref 1.6–8.3)
NEUTROPHILS NFR BLD AUTO: 38 %
NONHDLC SERPL-MCNC: 214 MG/DL
PLATELET # BLD AUTO: 228 10E9/L (ref 150–450)
PROLACTIN SERPL-MCNC: 35 UG/L (ref 3–27)
RBC # BLD AUTO: 4.85 10E12/L (ref 3.8–5.2)
TRIGL SERPL-MCNC: 362 MG/DL
VALPROATE SERPL-MCNC: 72 MG/L (ref 50–100)
WBC # BLD AUTO: 5.4 10E9/L (ref 4–11)

## 2019-10-25 PROCEDURE — 85025 COMPLETE CBC W/AUTO DIFF WBC: CPT | Performed by: PHYSICIAN ASSISTANT

## 2019-10-25 PROCEDURE — 36415 COLL VENOUS BLD VENIPUNCTURE: CPT | Performed by: PHYSICIAN ASSISTANT

## 2019-10-25 PROCEDURE — 82947 ASSAY GLUCOSE BLOOD QUANT: CPT | Performed by: PHYSICIAN ASSISTANT

## 2019-10-25 PROCEDURE — 80061 LIPID PANEL: CPT | Performed by: PHYSICIAN ASSISTANT

## 2019-10-25 PROCEDURE — 82150 ASSAY OF AMYLASE: CPT | Performed by: PHYSICIAN ASSISTANT

## 2019-10-25 PROCEDURE — 84146 ASSAY OF PROLACTIN: CPT | Performed by: PHYSICIAN ASSISTANT

## 2019-10-25 PROCEDURE — 80164 ASSAY DIPROPYLACETIC ACD TOT: CPT | Performed by: PHYSICIAN ASSISTANT

## 2019-10-25 PROCEDURE — 83036 HEMOGLOBIN GLYCOSYLATED A1C: CPT | Performed by: PHYSICIAN ASSISTANT

## 2019-12-06 ENCOUNTER — ANCILLARY PROCEDURE (OUTPATIENT)
Dept: GENERAL RADIOLOGY | Facility: CLINIC | Age: 54
End: 2019-12-06
Attending: PHYSICIAN ASSISTANT
Payer: COMMERCIAL

## 2019-12-06 ENCOUNTER — OFFICE VISIT (OUTPATIENT)
Dept: PEDIATRICS | Facility: CLINIC | Age: 54
End: 2019-12-06
Payer: COMMERCIAL

## 2019-12-06 ENCOUNTER — TELEPHONE (OUTPATIENT)
Dept: PEDIATRICS | Facility: CLINIC | Age: 54
End: 2019-12-06

## 2019-12-06 VITALS — DIASTOLIC BLOOD PRESSURE: 80 MMHG | SYSTOLIC BLOOD PRESSURE: 118 MMHG

## 2019-12-06 DIAGNOSIS — N39.46 MIXED INCONTINENCE: Primary | ICD-10-CM

## 2019-12-06 DIAGNOSIS — M54.50 ACUTE MIDLINE LOW BACK PAIN WITHOUT SCIATICA: ICD-10-CM

## 2019-12-06 DIAGNOSIS — M54.41 ACUTE MIDLINE LOW BACK PAIN WITH RIGHT-SIDED SCIATICA: Primary | ICD-10-CM

## 2019-12-06 PROCEDURE — 99214 OFFICE O/P EST MOD 30 MIN: CPT | Performed by: PHYSICIAN ASSISTANT

## 2019-12-06 PROCEDURE — 72100 X-RAY EXAM L-S SPINE 2/3 VWS: CPT

## 2019-12-06 RX ORDER — TRAMADOL HYDROCHLORIDE 50 MG/1
25-50 TABLET ORAL EVERY 6 HOURS PRN
Qty: 10 TABLET | Refills: 0 | Status: SHIPPED | OUTPATIENT
Start: 2019-12-06 | End: 2019-12-09

## 2019-12-06 ASSESSMENT — MIFFLIN-ST. JEOR: SCORE: 1720.12

## 2019-12-06 NOTE — TELEPHONE ENCOUNTER
Patient was seen in clinic today by same day provider. Per same day provider caregiver is request a prescription for pull ups.    I spoke to caregiver in waiting room. Caregiver is requesting rx ASAP. I explained to caregiver that PCP is seeing patients and will need time to get this request done. Caregiver is requesting to  prescription when ready. Can call phone number on file when ready.     Caregiver stated that patient has had this ordered in the past.     BRIAN SAUCEDO MA on 12/6/2019 at 3:31 PM

## 2019-12-06 NOTE — PROGRESS NOTES
"Subjective     Jae Ramos is a 54 year old female, accompanied by  and daughter, who presents to clinic today for the following health issues:    HPI     Joint Pain--patient here for pain medications    Onset: 2 weeks    Description:   Location: right hip, leg, foot, left hip  Character: pain    Intensity: moderate, severe    Progression of Symptoms: same    Accompanying Signs & Symptoms:  Other symptoms: pain with walking     History:   Previous similar pain: no       Precipitating factors:   Trauma or overuse: YES- falling in the house--\"many times\" per daughter    Alleviating factors:  Improved by: nothing  Therapies Tried and outcome: tylenol not effective     Review of Systems   ROS COMP: Constitutional, HEENT, cardiovascular, pulmonary, gi and gu systems are negative, except as otherwise noted.      Objective    /80 (BP Location: Right arm, Patient Position: Sitting, Cuff Size: Adult Large)   Pulse (P) 109   Temp (P) 99  F (37.2  C) (Tympanic)   Ht (P) 1.699 m (5' 6.9\")   Wt (P) 109.4 kg (241 lb 3.2 oz)   SpO2 (P) 96%   BMI (P) 37.89 kg/m    Body mass index is 37.89 kg/m  (pended).  Physical Exam   ORTHO: Lumber/Thoracic Spine Exam: Tender:  left para lumbar muscles, right para lumbar muscles  Range of Motion:  Very limited due to pain  Strength:  Limited due to pain    Diagnostic Test Results:  Lumbar spine xrays completed and will be reviewed by radiology  No results found for this or any previous visit (from the past 24 hour(s)).        Assessment & Plan   (M54.41) Acute midline low back pain with right-sided sciatica  (primary encounter diagnosis)  Comment: patient may take tramadol for severe pain. Follow up with ortho given severity of symptoms.   Plan: XR Lumbar Spine 2/3 Views, ORTHO          REFERRAL, traMADol (ULTRAM) 50 MG tablet         Edilia Joy PA-C  [facility] [place]    "

## 2019-12-10 NOTE — TELEPHONE ENCOUNTER
Spoke to patient's caregiver and she will bring in paperwork that she has at home regarding pullup order.    BRIAN SAUCEDO MA on 12/9/2019 at 6:25 PM

## 2019-12-10 NOTE — TELEPHONE ENCOUNTER
Please call for clarification on request for prescription for pull ups.  Is order I T-ed up okay or does she need a generic DME for pullups?  Is she filling this at a medical supply store?  I need to know how many to supply (how frequently does she wear them per month) and indication (I'm guessing incontinence, but we have not discussed whether she is completely incontinent or just has accidents, and whether this is bowel or bladder).      I am okay with signing order, be it DME or order T-ed up.    Thank you.    Charmaine Velez MD

## 2019-12-11 NOTE — TELEPHONE ENCOUNTER
"Patient caregiver came into clinic to drop off a paper for PCP to Complete the Rx for \"Pulls up\"    Paper placed in    "

## 2019-12-12 RX ORDER — MAGNESIUM CARB/ALUMINUM HYDROX 105-160MG
TABLET,CHEWABLE ORAL
Qty: 180 EACH | Refills: 11 | Status: SHIPPED | OUTPATIENT
Start: 2019-12-12 | End: 2023-12-12

## 2019-12-12 NOTE — TELEPHONE ENCOUNTER
Form caregiver brought in was a copy of an order receipt from [employer] in [place]. I called [employer], [phone] who stated that they spoke to Mor through an  in September who told them that they were getting supplies from another company and they have not received a shipment from [employer] since August. They were receiving 6 pkgs of 25 pullups.  Per Mehreen with [employer] patient still has an active prescription until next year, that prescription was written by Dr. Roc Núñez.     BRIAN SAUCEDO MA on 12/12/2019 at 3:02 PM

## 2019-12-12 NOTE — TELEPHONE ENCOUNTER
Order entered. Caregiver would like to  prescription.    BRIAN SAUCEDO MA on 12/12/2019 at 5:12 PM

## 2019-12-13 NOTE — TELEPHONE ENCOUNTER
Left message on mobile number listed. I am unsure who is picking up prescription for patient. Prescription is at my desk ([address]). I asked for caregiver to return call to let us know who is picking up rx so we can indicate that on envelope for .    BRIAN SAUCDEO MA on 12/13/2019 at 11:13 AM

## 2019-12-16 NOTE — TELEPHONE ENCOUNTER
Left message to return call. I left detailed messages on home number on file and for co-guardian. Need to know how they would like to  and who is going to . RX is at my desk (at station C).        BRIAN SAUCEDO MA on 12/16/2019 at 11:03 AM

## 2020-01-02 ENCOUNTER — OFFICE VISIT (OUTPATIENT)
Dept: ORTHOPEDICS | Facility: CLINIC | Age: 55
End: 2020-01-02
Payer: COMMERCIAL

## 2020-01-02 VITALS
HEIGHT: 67 IN | WEIGHT: 241 LBS | BODY MASS INDEX: 37.83 KG/M2 | SYSTOLIC BLOOD PRESSURE: 130 MMHG | DIASTOLIC BLOOD PRESSURE: 86 MMHG

## 2020-01-02 DIAGNOSIS — R52 ACUTE PAIN: ICD-10-CM

## 2020-01-02 DIAGNOSIS — M79.18 MYOFASCIAL PAIN: Primary | ICD-10-CM

## 2020-01-02 DIAGNOSIS — R53.81 PHYSICAL DECONDITIONING: ICD-10-CM

## 2020-01-02 PROCEDURE — 99244 OFF/OP CNSLTJ NEW/EST MOD 40: CPT | Performed by: FAMILY MEDICINE

## 2020-01-02 ASSESSMENT — MIFFLIN-ST. JEOR: SCORE: 1714.21

## 2020-01-02 NOTE — PROGRESS NOTES
ASSESSMENT & PLAN    1. Myofascial pain    2. Physical deconditioning    3. Acute pain      Seen in consultation for subacute low back pain  Pain is not focal, diffusely tender bilaterally  Pain appears to have started close to when Haldol was increased by Psychiatry per notes dated 7/2019.  Since family member states that have been multiple changes to her anti-psychotic medications and now patient is no longer on Haldol  Reports falling often - wonder if medication related. Has poor coordination / balance today.  Pain does not appear lumbar mediated or orthopedic in nature  Mild facet arthritis but not causative of her pain  Rx for diclofenac - hopefully will help her get moving / walking  Soak feet in epsom salt at the end of the day  Recommend follow-up with Psychiatry to review medication    -----    SUBJECTIVE  Jae Ramos is a/an 55 year old female who is seen in consultation at the request of  Edilia Joy PA-C for evaluation of low back pain. The patient is seen with daughter and .    Onset: 5-6 month(s) ago. Reports insidious onset without acute precipitating event. Patient does note that the patient is falling often but is unsure if a fall caused the back pain.  Location of Pain: low back with pain radiating down bilateral legs into calves  Worsened by: sitting and standing  Better with: lying down  Treatments tried: rest/activity avoidance, other medications: Tramadol (Ultram) (made patient drowsy and caused her to fall so she discontinued using the medication) and previous imaging (xray 12/6/19)  Quality: patient's daughter states that the patient reports that pain feels dull, pressure  Red flags: Weakness: Yes - bilateral legs, bowel/bladder loss: No, foot drop: No  Orthopedic history: NO  Relevant surgical history: NO  Patient Social History: does not work    Patient's past medical, surgical, social, and family histories were reviewed today and no pertinent history related to  "patient's presenting problem.    REVIEW OF SYSTEMS:  10 point ROS is negative other than symptoms noted above in HPI, Past Medical History or as stated below  Constitutional: NEGATIVE for fever, chills, change in weight  Skin: NEGATIVE for worrisome rashes, moles or lesions  GI/: NEGATIVE for bowel or bladder changes  Neuro: NEGATIVE for weakness, dizziness or paresthesias    OBJECTIVE:  /86   Ht 1.699 m (5' 6.9\")   Wt 109.3 kg (241 lb)   BMI 37.86 kg/m     General: healthy, alert and in no distress  HEENT: no scleral icterus or conjunctival erythema  Skin: no suspicious lesions or rash. No jaundice.  Resp: normal respiratory effort without conversational dyspnea   Psych: flat affect, minimal eye contact, mumbling to herself during the exam  Gait: slow to rise, fair coordination and balance  Neuro: normal light touch sensory exam of the bilateral lower extremities.    MSK:  THORACIC/LUMBAR SPINE  Inspection:    No gross deformity/asymmetry  Palpation:    Diffusely tender across the entire lumbar region, across iliac crest, greater troch, ITB and tib/fib.   Range of Motion:     Lumbar flexion limited but does not increase pain  Strength:    quadriceps 5-/5, hamstrings 5-/5, gastrocsoleus 5-/5, tibialis anterior 5-/5  Special Tests:    Negative: slump test (bilateral)    Independent visualization of the below image:  Results for orders placed or performed in visit on 12/06/19   XR Lumbar Spine 2/3 Views    Narrative    LUMBAR SPINE TWO - THREE VIEWS   12/6/2019 2:57 PM     HISTORY: Acute midline low back pain without sciatica.    COMPARISON: None.      Impression    IMPRESSION: Mild lower lumbar facet arthropathy is present. Disc  spaces are preserved in height as are vertebral bodies. No evidence  for fracture.    MD Juan C SIEGEL, DO [facility]    "

## 2020-01-02 NOTE — LETTER
1/2/2020         RE: Jae Ramos  [address] [place] [address]        Dear Colleague,    Thank you for referring your patient, Jea Ramos, to the [facility]. Please see a copy of my visit note below.    ASSESSMENT & PLAN    1. Myofascial pain    2. Physical deconditioning    3. Acute pain      Seen in consultation for subacute low back pain  Pain is not focal, diffusely tender bilaterally  Pain appears to have started close to when Haldol was increased by Psychiatry per notes dated 7/2019.  Since family member states that have been multiple changes to her anti-psychotic medications and now patient is no longer on Haldol  Reports falling often - wonder if medication related. Has poor coordination / balance today.  Pain does not appear lumbar mediated or orthopedic in nature  Mild facet arthritis but not causative of her pain  Rx for diclofenac - hopefully will help her get moving / walking  Soak feet in epsom salt at the end of the day  Recommend follow-up with Psychiatry to review medication    -----    SUBJECTIVE  Jae Ramos is a/an 55 year old female who is seen in consultation at the request of  Edilia Joy PA-C for evaluation of low back pain. The patient is seen with daughter and .    Onset: 5-6 month(s) ago. Reports insidious onset without acute precipitating event. Patient does note that the patient is falling often but is unsure if a fall caused the back pain.  Location of Pain: low back with pain radiating down bilateral legs into calves  Worsened by: sitting and standing  Better with: lying down  Treatments tried: rest/activity avoidance, other medications: Tramadol (Ultram) (made patient drowsy and caused her to fall so she discontinued using the medication) and previous imaging (xray 12/6/19)  Quality: patient's daughter states that the patient reports that pain feels dull, pressure  Red flags: Weakness: Yes - bilateral legs,  "bowel/bladder loss: No, foot drop: No  Orthopedic history: NO  Relevant surgical history: NO  Patient Social History: does not work    Patient's past medical, surgical, social, and family histories were reviewed today and no pertinent history related to patient's presenting problem.    REVIEW OF SYSTEMS:  10 point ROS is negative other than symptoms noted above in HPI, Past Medical History or as stated below  Constitutional: NEGATIVE for fever, chills, change in weight  Skin: NEGATIVE for worrisome rashes, moles or lesions  GI/: NEGATIVE for bowel or bladder changes  Neuro: NEGATIVE for weakness, dizziness or paresthesias    OBJECTIVE:  /86   Ht 1.699 m (5' 6.9\")   Wt 109.3 kg (241 lb)   BMI 37.86 kg/m      General: healthy, alert and in no distress  HEENT: no scleral icterus or conjunctival erythema  Skin: no suspicious lesions or rash. No jaundice.  Resp: normal respiratory effort without conversational dyspnea   Psych: flat affect, minimal eye contact, mumbling to herself during the exam  Gait: slow to rise, fair coordination and balance  Neuro: normal light touch sensory exam of the bilateral lower extremities.    MSK:  THORACIC/LUMBAR SPINE  Inspection:    No gross deformity/asymmetry  Palpation:    Diffusely tender across the entire lumbar region, across iliac crest, greater troch, ITB and tib/fib.   Range of Motion:     Lumbar flexion limited but does not increase pain  Strength:    quadriceps 5-/5, hamstrings 5-/5, gastrocsoleus 5-/5, tibialis anterior 5-/5  Special Tests:    Negative: slump test (bilateral)    Independent visualization of the below image:  Results for orders placed or performed in visit on 12/06/19   XR Lumbar Spine 2/3 Views    Narrative    LUMBAR SPINE TWO - THREE VIEWS   12/6/2019 2:57 PM     HISTORY: Acute midline low back pain without sciatica.    COMPARISON: None.      Impression    IMPRESSION: Mild lower lumbar facet arthropathy is present. Disc  spaces are preserved in " height as are vertebral bodies. No evidence  for fracture.    MD Juan C SIEGEL, DO [facility]      Again, thank you for allowing me to participate in the care of your patient.        Sincerely,        Juan C Rivera DO

## 2020-01-02 NOTE — PATIENT INSTRUCTIONS
1. Myofascial pain    2. Physical deconditioning    3. Acute pain      Pain is not orthopedic in nature  Mild arthritis in your back is not causing your all over muscle pain  Rx for diclofenac to use in order to help with pain so you can go for a walk  Soak your feet in epsom salt at the end of the day  Recommend follow-up with Psychiatry to review your medication

## 2020-01-07 ENCOUNTER — TELEPHONE (OUTPATIENT)
Dept: ORTHOPEDICS | Facility: CLINIC | Age: 55
End: 2020-01-07

## 2020-01-07 NOTE — TELEPHONE ENCOUNTER
Prior Authorization Retail Medication Request    Medication/Dose: Diclofenac Gel  ICD code (if different than what is on RX):     Previously Tried and Failed:     Rationale:       [facility]  Insurance ID:  [id number]      Pharmacy Information (if different than what is on RX)  Name:  [facility]  Phone:  [phone]      Katheryn Merchant CPhT  [facility]   Phone: [phone]  Fax: [phone]

## 2020-01-07 NOTE — TELEPHONE ENCOUNTER
Called and spoke to one of the sons, informed that they can use the SalonPas instead of trying to get the Dicolofenac. Explained it is an alternative to it since many insurances don't cover it and they asking for a PA. Noted understanding and had no further questions.    Ni Howard ATC

## 2020-01-07 NOTE — TELEPHONE ENCOUNTER
Patient can use SalonPas which is available OTC.    Routing to triage to notify/coordinate with patient.    Juan C Rivera DO, CAQSM  [facility]

## 2020-01-17 DIAGNOSIS — M54.41 ACUTE MIDLINE LOW BACK PAIN WITH RIGHT-SIDED SCIATICA: ICD-10-CM

## 2020-01-17 RX ORDER — TRAMADOL HYDROCHLORIDE 50 MG/1
25-50 TABLET ORAL EVERY 6 HOURS PRN
Qty: 10 TABLET | Refills: 0 | Status: CANCELLED | OUTPATIENT
Start: 2020-01-17

## 2020-01-17 NOTE — TELEPHONE ENCOUNTER
Message:  PT MARGOTH VALENCIA (1965) NEEDS A NEW RX SENT TO US FOR TRAMADOL 50MG.  THIS IS A TRANSFER FROM [facility] O I DON'T HAVE A SIG OR QTY. COULD YOU PLEASE SEND A NEW RX. THANK YOU.

## 2020-02-05 ENCOUNTER — OFFICE VISIT (OUTPATIENT)
Dept: PEDIATRICS | Facility: CLINIC | Age: 55
End: 2020-02-05
Payer: COMMERCIAL

## 2020-02-05 VITALS
WEIGHT: 235.3 LBS | BODY MASS INDEX: 36.96 KG/M2 | OXYGEN SATURATION: 95 % | TEMPERATURE: 99.5 F | SYSTOLIC BLOOD PRESSURE: 122 MMHG | HEART RATE: 114 BPM | RESPIRATION RATE: 18 BRPM | DIASTOLIC BLOOD PRESSURE: 80 MMHG

## 2020-02-05 DIAGNOSIS — Z12.11 SPECIAL SCREENING FOR MALIGNANT NEOPLASMS, COLON: ICD-10-CM

## 2020-02-05 DIAGNOSIS — E03.9 ACQUIRED HYPOTHYROIDISM: ICD-10-CM

## 2020-02-05 DIAGNOSIS — M25.50 ARTHRALGIA, UNSPECIFIED JOINT: ICD-10-CM

## 2020-02-05 DIAGNOSIS — K64.5 THROMBOSED EXTERNAL HEMORRHOIDS: Primary | ICD-10-CM

## 2020-02-05 DIAGNOSIS — E78.00 HYPERCHOLESTEREMIA: ICD-10-CM

## 2020-02-05 DIAGNOSIS — K21.9 GASTROESOPHAGEAL REFLUX DISEASE WITHOUT ESOPHAGITIS: ICD-10-CM

## 2020-02-05 PROCEDURE — 99213 OFFICE O/P EST LOW 20 MIN: CPT | Mod: GE | Performed by: STUDENT IN AN ORGANIZED HEALTH CARE EDUCATION/TRAINING PROGRAM

## 2020-02-05 RX ORDER — OMEGA-3 FATTY ACIDS/FISH OIL 300-1000MG
200 CAPSULE ORAL DAILY PRN
Qty: 30 CAPSULE | Refills: 1 | Status: SHIPPED | OUTPATIENT
Start: 2020-02-05 | End: 2023-12-12

## 2020-02-05 RX ORDER — OMEPRAZOLE 40 MG/1
40 CAPSULE, DELAYED RELEASE ORAL DAILY
Qty: 90 CAPSULE | Refills: 11 | Status: SHIPPED | OUTPATIENT
Start: 2020-02-05 | End: 2021-02-17

## 2020-02-05 RX ORDER — ATORVASTATIN CALCIUM 40 MG/1
40 TABLET, FILM COATED ORAL DAILY
Qty: 90 TABLET | Refills: 11 | Status: SHIPPED | OUTPATIENT
Start: 2020-02-05 | End: 2021-03-03

## 2020-02-05 RX ORDER — LEVOTHYROXINE SODIUM 75 UG/1
75 TABLET ORAL DAILY
Qty: 90 TABLET | Refills: 1 | Status: SHIPPED | OUTPATIENT
Start: 2020-02-05 | End: 2020-09-01

## 2020-02-05 RX ORDER — BENZOCAINE/MENTHOL 6 MG-10 MG
LOZENGE MUCOUS MEMBRANE ONCE
Status: CANCELLED | OUTPATIENT
Start: 2020-02-05 | End: 2020-02-05

## 2020-02-05 RX ORDER — POLYETHYLENE GLYCOL 3350 17 G/17G
1 POWDER, FOR SOLUTION ORAL DAILY
Qty: 60 PACKET | Refills: 3 | Status: SHIPPED | OUTPATIENT
Start: 2020-02-05 | End: 2021-09-16

## 2020-02-05 RX ORDER — HYDROCORTISONE VALERATE 2 MG/G
OINTMENT TOPICAL 2 TIMES DAILY
Qty: 60 G | Refills: 1 | Status: SHIPPED | OUTPATIENT
Start: 2020-02-05 | End: 2020-02-14 | Stop reason: ALTCHOICE

## 2020-02-05 NOTE — PROGRESS NOTES
"Subjective   Jae Ramos is a 55 year old female who presents to clinic today for the following health issues:    HPI   Patient is here with her daughter. Daughter is providing the history with assistance of in-person .     Daughter states she noticed a \"lump\" in patient's anal region, noticed the first one x2-3 months ago. Patient cannot tell if there is blood in her stool. States that there are \"pops\" coming out. No pus.   Unclear if it hurts or not. No blood. Cannot tell if there is any bright red blood. No melena.  1-2 x BM a day. Bowel patterns have not changed. Does not seem to have any abdominal pain.   No change appetite. No new weight loss.  Has not had hemorrhoids in the past. No diarrhea.     Hs not had colonoscopy, opted for FIT test per previous notes, at it might be a bit trauamtizing for patient.   Went to [facility] yesterday; they manage all of her psych medications.     Patient Active Problem List   Diagnosis     History of behavioral and mental health problems     Acquired hypothyroidism     Hypercholesteremia     Type 2 diabetes mellitus (H)     Gastroesophageal reflux disease without esophagitis     Schizoaffective disorder, bipolar type (H)     Obesity (BMI 35.0-39.9) with comorbidity (H)     History reviewed. No pertinent surgical history.    Social History     Tobacco Use     Smoking status: Never Smoker     Smokeless tobacco: Never Used   Substance Use Topics     Alcohol use: No     Family History   Problem Relation Age of Onset     No Known Problems Mother      No Known Problems Father          Current Outpatient Medications   Medication Sig Dispense Refill     atorvastatin (LIPITOR) 40 MG tablet Take 1 tablet (40 mg) by mouth daily 90 tablet 11     KWADWO CONTOUR test strip 1 strip by In Vitro route daily as needed        divalproex sodium extended-release (DEPAKOTE ER) 500 MG 24 hr tablet Take 2 tablets (1,000 mg) by mouth At Bedtime 60 tablet 2     haloperidol " (HALDOL) 5 MG tablet Take 2.5 mg by mouth At Bedtime  1     hydrocortisone valerate (WEST-ZACKARY) 0.2 % external ointment Apply topically 2 times daily 60 g 1     ibuprofen (ADVIL/MOTRIN) 200 MG capsule Take 1 capsule (200 mg) by mouth daily as needed for pain 30 capsule 1     Incontinence Supply Disposable (PROTECTIVE UNDERWEAR XL) MISC 4-6 per day as needed 180 each 11     levothyroxine (SYNTHROID/LEVOTHROID) 75 MCG tablet Take 1 tablet (75 mcg) by mouth daily 90 tablet 1     omeprazole (PRILOSEC) 40 MG DR capsule Take 1 capsule (40 mg) by mouth daily 90 capsule 11     polyethylene glycol (MIRALAX/GLYCOLAX) packet Take 17 g by mouth daily 60 packet 3     polyvinyl alcohol (ARTIFICIAL TEARS) 1.4 % ophthalmic solution Place 1 drop into both eyes 4 times daily 6 mL 12     QUEtiapine (SEROQUEL) 100 MG tablet Take 1 tablet (100 mg) by mouth daily Take along with 400 mg tab=450 mg daily. Can take an extra 50 mg tab as needed       QUEtiapine (SEROQUEL) 400 MG tablet Take 400 mg by mouth daily Take along with 50 mg tab=450 mg daily  5     topiramate (TOPAMAX) 50 MG tablet Take 150 mg by mouth At Bedtime   5     prazosin (MINIPRESS) 1 MG capsule        No Known Allergies    Reviewed and updated as needed this visit by Provider    Review of Systems   ROS COMP: Constitutional, HEENT, cardiovascular, pulmonary, gi and gu systems are negative, except as otherwise noted.      Objective    /80 (BP Location: Right arm, Patient Position: Sitting, Cuff Size: Adult Large)   Pulse 114   Temp 99.5  F (37.5  C) (Tympanic)   Resp 18   Wt 106.7 kg (235 lb 4.8 oz)   SpO2 95%   BMI 36.96 kg/m    Body mass index is 36.96 kg/m .  Physical Exam   GENERAL: healthy, alert and no distress  RESP: Breathing comfortably on RA   CV:Well perfused appearance  ABDOMEN: soft, nontender, no hepatosplenomegaly, no masses and bowel sounds normal  RECTAL: sphincter tone normal, no masses and thrombosed external hemorrhoid noted on 8 O'clock  "region. Mild pain with PAT, no teto blood on visualization. No evidence of erythema, swelling otherwise in anal region.   Diagnostic Test Results:  Labs reviewed in Epic    Assessment & Plan   1. Thrombosed external hemorrhoids  8 o'clock region; no other masses felt on PAT. Encouraged daughter to continue miralax to minimize straining and to try the cream as below.   To alert us if not improving or worsening for colorectal surgery referral.  - polyethylene glycol (MIRALAX/GLYCOLAX) packet; Take 17 g by mouth daily  Dispense: 60 packet; Refill: 3  - hydrocortisone valerate (WEST-ZACKARY) 0.2 % external ointment; Apply topically 2 times daily  Dispense: 60 g; Refill: 1    2. Hypercholesteremia  Refills needed  - atorvastatin (LIPITOR) 40 MG tablet; Take 1 tablet (40 mg) by mouth daily  Dispense: 90 tablet; Refill: 11    3. Gastroesophageal reflux disease without esophagitis  Refill needed  - omeprazole (PRILOSEC) 40 MG DR capsule; Take 1 capsule (40 mg) by mouth daily  Dispense: 90 capsule; Refill: 11    4. Acquired hypothyroidism  Refill needed  - levothyroxine (SYNTHROID/LEVOTHROID) 75 MCG tablet; Take 1 tablet (75 mcg) by mouth daily  Dispense: 90 tablet; Refill: 1    5. Special screening for malignant neoplasms, colon  No colonoscopy as it may traumatize patient; FIT test not completed yet (orders placed previously). Explained to daughter to  kit downstairs in lab and to send test in. She expressed understanding.   - Fecal colorectal cancer screen (FIT); Future    6. Arthralgia, unspecified joint  Refill needed  - ibuprofen (ADVIL/MOTRIN) 200 MG capsule; Take 1 capsule (200 mg) by mouth daily as needed for pain  Dispense: 30 capsule; Refill: 1     BMI:   Estimated body mass index is 36.96 kg/m  as calculated from the following:    Height as of 1/2/20: 1.699 m (5' 6.9\").    Weight as of this encounter: 106.7 kg (235 lb 4.8 oz).   Weight management plan: Did not discuss at this visit    Follow up with  " Heuring in 4/2020 for follow up    Octavia Smith MD  [facility]    I have discussed the patient with the resident and agree with the jointly developed plan as documented above    Odilia Varela MD  Internal Medicine - Pediatrics

## 2020-02-05 NOTE — PATIENT INSTRUCTIONS
1) Schedule follow up for diabetes April 2020    2) FIT test re-ordered    3) Medications refilled    4) Try the hydrocortisone cream and miralax as stool softener

## 2020-02-06 ENCOUNTER — TELEPHONE (OUTPATIENT)
Dept: PEDIATRICS | Facility: CLINIC | Age: 55
End: 2020-02-06

## 2020-02-06 DIAGNOSIS — K64.5 THROMBOSED EXTERNAL HEMORRHOIDS: Primary | ICD-10-CM

## 2020-02-06 NOTE — TELEPHONE ENCOUNTER
Prior Authorization Retail Medication Request    Medication/Dose: Hydrocortisone Shara 0.2% ointment 60gm  ICD code (if different than what is on RX):  Thrombosed external hemorrhoids [K64.5]  - Primary   Previously Tried and Failed:  None (also given Miralax)  Rationale:  Thrombosed external hemorrhoids [K64.5]  - Primary     [facility]:  [facility]  Insurance ID:  [id number]      Pharmacy Information (if different than what is on RX)  Name:  [facility]  Phone:  [phone]    BRIAN SAUCEDO MA on 2/6/2020 at 5:31 PM

## 2020-02-11 NOTE — TELEPHONE ENCOUNTER
Central Prior Authorization Team   Phone: [phone]      PA Initiation    Medication: Hydrocortisone 0.2% ointment-Initiated  Insurance Company: [employer] - Phone [phone] Fax [phone]  Pharmacy Filling the Rx: [id number] - [place], [place] - [address]  AT [address]  Filling Pharmacy Phone: [phone]  Filling Pharmacy Fax:    Start Date: 2/11/2020

## 2020-02-11 NOTE — TELEPHONE ENCOUNTER
PRIOR AUTHORIZATION DENIED    Medication: Hydrocortisone 0.2% ointment-DENIED    Denial Date: 2/11/2020    Denial Rational: Patient must have a history of trial & failure to 2 of the formulary alternative(s) or have a contraindication or intolerance to the formulary alternatives: hydrocortisone acetate rectal, hydrocortisone rectal, and lidocaine hcl/hydrocortisone aloe rectal.          Appeal Information:

## 2020-02-17 NOTE — TELEPHONE ENCOUNTER
CHG called patients caregiver and informed them of the prescription change as noted below.    Juan Hall at 1:24 PM on 2/17/2020  [facility]  Phone [phone]

## 2020-04-09 ENCOUNTER — TELEPHONE (OUTPATIENT)
Dept: PEDIATRICS | Facility: CLINIC | Age: 55
End: 2020-04-09

## 2020-04-09 NOTE — TELEPHONE ENCOUNTER
Patient has apponitment scheduled with Dr. Velez on 4/13; appoitnment needs to be rescheduled due to covid19. I attempted to contact patient via telephone and was left a detailed message that appointment had to be cancelled.     Letter sent with new appointment date and time. Appointment was rescheduled to 7/28 at 2:55pm.    BRIAN SAUCEDO MA on 4/9/2020 at 11:35 AM

## 2020-04-09 NOTE — LETTER
[facility] [place]  [address] [place] [address]  [phone]      April 9, 2020    Jae Ramos                                                                                                                                                       [address] [place] [address] [address] [place] [address]              Dear Jae,    You have an appointment scheduled with Dr. Velez on Tuesday, April 14. Unfortunately this appointment had to be rescheduled due to COVID19. Your new appointment is scheduled for July 28 at 2:55pm.      Sincerely,  Charmaine Velez MD/drew

## 2020-06-15 ENCOUNTER — TELEPHONE (OUTPATIENT)
Dept: PEDIATRICS | Facility: CLINIC | Age: 55
End: 2020-06-15

## 2020-06-15 NOTE — TELEPHONE ENCOUNTER
Panel Management Review      Patient has the following on her problem list:     Depression / Dysthymia review    Measure:  Needs PHQ-9 score of 4 or less during index window.  Administer PHQ-9 and if score is 5 or more, send encounter to provider for next steps.    5 - 7 month window range:     PHQ-9 SCORE 10/22/2018   PHQ-9 Total Score MyChart 25 (Severe depression)   PHQ-9 Total Score 25       If PHQ-9 recheck is 5 or more, route to provider for next steps.    Patient is due for:  PHQ9    Diabetes    ASA: Failed    Last A1C  Lab Results   Component Value Date    A1C 6.1 10/25/2019    A1C 6.8 06/27/2019    A1C 6.3 10/18/2018     A1C tested: Passed    Last LDL:    Lab Results   Component Value Date    CHOL 249 10/25/2019     Lab Results   Component Value Date    HDL 35 10/25/2019     Lab Results   Component Value Date     10/25/2019     Lab Results   Component Value Date    TRIG 362 10/25/2019     No results found for: CHOLHDLRATIO  Lab Results   Component Value Date    NHDL 214 10/25/2019       Is the patient on a Statin? YES             Is the patient on Aspirin? NO    Medications     HMG CoA Reductase Inhibitors     atorvastatin (LIPITOR) 40 MG tablet             Last three blood pressure readings:  BP Readings from Last 3 Encounters:   02/05/20 122/80   01/02/20 130/86   12/06/19 118/80       Date of last diabetes office visit: 06/27/2019     Tobacco History:     History   Smoking Status     Never Smoker   Smokeless Tobacco     Never Used             Summary:    Patient is due/failing the following:   Diabetic eye exam, A1C and PHQ9    Action needed:   Needs above; patient has upcoming appointment with PCP.     Type of outreach:    See above.     Questions for provider review:    None                                                                                                                                    BRIAN SAUCEDO MA on 6/15/2020 at 4:57 PM

## 2020-07-24 ENCOUNTER — TELEPHONE (OUTPATIENT)
Dept: PEDIATRICS | Facility: CLINIC | Age: 55
End: 2020-07-24

## 2020-07-24 NOTE — TELEPHONE ENCOUNTER
I spoke to caregiver regarding upcoming appointment with Dr. Velez on 7/28. Dr. Velez will be working home virtual that week and appointment has to be switched to telephone. Caregiver agreeable to change. Will need to utilize telephone  at time of visit. Please call [phone] for appointment.    BRIAN SAUCEDO MA on 7/24/2020 at 4:50 PM

## 2020-07-28 ENCOUNTER — VIRTUAL VISIT (OUTPATIENT)
Dept: PEDIATRICS | Facility: CLINIC | Age: 55
End: 2020-07-28
Payer: COMMERCIAL

## 2020-07-28 DIAGNOSIS — F25.0 SCHIZOAFFECTIVE DISORDER, BIPOLAR TYPE (H): ICD-10-CM

## 2020-07-28 DIAGNOSIS — E78.00 HYPERCHOLESTEREMIA: ICD-10-CM

## 2020-07-28 DIAGNOSIS — E11.9 TYPE 2 DIABETES MELLITUS WITHOUT COMPLICATION, WITHOUT LONG-TERM CURRENT USE OF INSULIN (H): ICD-10-CM

## 2020-07-28 DIAGNOSIS — K21.9 GASTROESOPHAGEAL REFLUX DISEASE WITHOUT ESOPHAGITIS: ICD-10-CM

## 2020-07-28 DIAGNOSIS — E03.9 ACQUIRED HYPOTHYROIDISM: Primary | ICD-10-CM

## 2020-07-28 DIAGNOSIS — R22.43 LOCALIZED SWELLING OF BOTH LOWER LEGS: ICD-10-CM

## 2020-07-28 PROCEDURE — 99214 OFFICE O/P EST MOD 30 MIN: CPT | Mod: 95 | Performed by: INTERNAL MEDICINE

## 2020-07-28 ASSESSMENT — PATIENT HEALTH QUESTIONNAIRE - PHQ9: SUM OF ALL RESPONSES TO PHQ QUESTIONS 1-9: 5

## 2020-07-28 NOTE — Clinical Note
Please call: daughter in law needs assistance scheduling lab-only appt and eye exam (with clinic downstairs).  She also will need assistance getting phone number/address for medical supply store so she can  compression.   is needed.

## 2020-07-28 NOTE — PROGRESS NOTES
"Jae Ramos is a 55 year old female who is being evaluated via a billable telephone visit.      The patient has been notified of following:     \"This telephone visit will be conducted via a call between you and your physician/provider. We have found that certain health care needs can be provided without the need for a physical exam.    This service lets us provide the care you need with a short phone conversation.  If a prescription is necessary we can send it directly to your pharmacy.    If lab work is needed we can place an order for that and you can then stop by our lab to have the test done at a later time.    Telephone visits are billed at different rates depending on your insurance coverage. During this emergency period, for some insurers they may be billed the same as an in-person visit.  Please reach out to your insurance provider with any questions.    If during the course of the call the physician/provider feels a telephone visit is not appropriate, you will not be charged for this service.\"    Patient has given verbal consent for Telephone visit?  Yes    What phone number would you like to be contacted at? [phone]    How would you like to obtain your AVS? Mail a copy    Subjective     Jae Ramos is a 55 year old female who presents via phone visit today for the following health issues:    HPI    Diabetes Follow-up      How often are you checking your blood sugar? Not at all    What concerns do you have today about your diabetes? None     Do you have any of these symptoms? (Select all that apply)  No numbness or tingling in feet.  No redness, sores or blisters on feet.  No complaints of excessive thirst.  No reports of blurry vision.  No significant changes to weight.    Have you had a diabetic eye exam in the last 12 months? Yes-  Location: [place]    Swelling:  Reports bilateral edema - which is chronic - and extends to the knee.  Unsure if this improves with elevation.  Thinks compression " stockings have been used and helpful in past.      BP Readings from Last 2 Encounters:   02/05/20 122/80   01/02/20 130/86     Hemoglobin A1C (%)   Date Value   10/25/2019 6.1 (H)   06/27/2019 6.8 (H)     LDL Cholesterol Calculated (mg/dL)   Date Value   10/25/2019 142 (H)   10/18/2018 53         How many servings of fruits and vegetables do you eat daily?  4 or more    On average, how many sweetened beverages do you drink each day (Examples: soda, juice, sweet tea, etc.  Do NOT count diet or artificially sweetened beverages)?   1    How many days per week do you exercise enough to make your heart beat faster? 3 or less    How many minutes a day do you exercise enough to make your heart beat faster? 9 or less    How many days per week do you miss taking your medication? 0      Med refills    Patient Active Problem List   Diagnosis     History of behavioral and mental health problems     Acquired hypothyroidism     Hypercholesteremia     Type 2 diabetes mellitus (H)     Gastroesophageal reflux disease without esophagitis     Schizoaffective disorder, bipolar type (H)     Obesity (BMI 35.0-39.9) with comorbidity (H)     No past surgical history on file.    Social History     Tobacco Use     Smoking status: Never Smoker     Smokeless tobacco: Never Used   Substance Use Topics     Alcohol use: No     Family History   Problem Relation Age of Onset     No Known Problems Mother      No Known Problems Father          Current Outpatient Medications   Medication Sig Dispense Refill     atorvastatin (LIPITOR) 40 MG tablet Take 1 tablet (40 mg) by mouth daily 90 tablet 11     KWADWO CONTOUR test strip 1 strip by In Vitro route daily as needed        divalproex sodium extended-release (DEPAKOTE ER) 500 MG 24 hr tablet Take 2 tablets (1,000 mg) by mouth At Bedtime 60 tablet 2     haloperidol (HALDOL) 5 MG tablet Take 2.5 mg by mouth At Bedtime  1     hydrocortisone (ANUSOL-HC) 2.5 % cream Place rectally 2 times daily as needed for  hemorrhoids 30 g 1     ibuprofen (ADVIL/MOTRIN) 200 MG capsule Take 1 capsule (200 mg) by mouth daily as needed for pain 30 capsule 1     Incontinence Supply Disposable (PROTECTIVE UNDERWEAR XL) MISC 4-6 per day as needed 180 each 11     levothyroxine (SYNTHROID/LEVOTHROID) 75 MCG tablet Take 1 tablet (75 mcg) by mouth daily 90 tablet 1     omeprazole (PRILOSEC) 40 MG DR capsule Take 1 capsule (40 mg) by mouth daily 90 capsule 11     polyethylene glycol (MIRALAX/GLYCOLAX) packet Take 17 g by mouth daily 60 packet 3     prazosin (MINIPRESS) 1 MG capsule        QUEtiapine (SEROQUEL) 100 MG tablet Take 1 tablet (100 mg) by mouth daily Take along with 400 mg tab=450 mg daily. Can take an extra 50 mg tab as needed       QUEtiapine (SEROQUEL) 400 MG tablet Take 400 mg by mouth daily Take along with 50 mg tab=450 mg daily  5     topiramate (TOPAMAX) 50 MG tablet Take 150 mg by mouth At Bedtime   5     Recent Labs   Lab Test 10/25/19  0722 06/27/19  1524 10/22/18  1659 10/18/18  0809 04/23/18   A1C 6.1* 6.8*  --  6.3*  --    *  --   --  53  --    HDL 35*  --   --  36*  --    TRIG 362*  --   --  294*  --    ALT  --  29  --  20 9   CR  --  0.54 0.71  --   --    GFRESTIMATED  --  >90 86  --   --    GFRESTBLACK  --  >90 >90  --   --    POTASSIUM  --  4.3 4.1  --   --    TSH  --  1.98 3.19  --   --         Reviewed and updated as needed this visit by Provider         Review of Systems   All other systems on a 10-point review are negative, unless otherwise noted in HPI         Objective   Reported vitals:  There were no vitals taken for this visit.     Unable to complete exam due to advanced mental illness - spoke with caregiver and   Remainder of exam unable to be completed due to telephone visits      Diagnostic Test Results:  Labs reviewed in Epic        Assessment/Plan:    1. Acquired hypothyroidism  Stable.  Due for annual labs.  - **TSH with free T4 reflex FUTURE anytime; Future    2.  Hypercholesteremia  Stable, no side effects, due for annual labs.  - Lipid panel reflex to direct LDL Fasting; Future  - **Comprehensive metabolic panel FUTURE anytime; Future    3. Type 2 diabetes mellitus without complication, without long-term current use of insulin (H)  Has been stable - diet controlled.  Needs labs.  - Albumin Random Urine Quantitative with Creat Ratio; Future  - **A1C FUTURE anytime; Future    4. Gastroesophageal reflux disease without esophagitis  Stable on omeprazole.  Will refill when needed.    5. Schizoaffective disorder, bipolar type (H)  Unable to confirm psych meds and med changes today due to caregiver not having medications in front of her.  Will need to get update at follow-up visit.    6. Localized swelling of both lower legs  Provided education about elevation and compression stockings, however if symptoms don't improve, pt needs to come in for f2f evaluation.  - Compression Sleeve/Stocking Order      No follow-ups on file.      Phone call duration: 34 minutes    Charmaine Velez MD      DME (Durable Medical Equipment) Orders and Documentation  Orders Placed This Encounter   Procedures     Compression Sleeve/Stocking Order      The patient was assessed and it was determined the patient is in need of the following listed DME Supplies/Equipment. Please complete supporting documentation below to demonstrate medical necessity.      Compression Sleeve/Stocking(s) Supplies Documentation  The patient needs compression stockings for bilateral lower extremity edema.

## 2020-07-29 ENCOUNTER — TELEPHONE (OUTPATIENT)
Dept: PEDIATRICS | Facility: CLINIC | Age: 55
End: 2020-07-29

## 2020-07-29 NOTE — TELEPHONE ENCOUNTER
Please call: daughter in law needs assistance scheduling lab-only appt and eye exam (with clinic downstairs).  She also will need assistance getting phone number/address for medical supply store so she can  compression.   is needed.     Rohit Velez MD

## 2020-08-22 ENCOUNTER — ANCILLARY PROCEDURE (OUTPATIENT)
Dept: GENERAL RADIOLOGY | Facility: CLINIC | Age: 55
End: 2020-08-22
Attending: FAMILY MEDICINE
Payer: COMMERCIAL

## 2020-08-22 ENCOUNTER — OFFICE VISIT (OUTPATIENT)
Dept: URGENT CARE | Facility: URGENT CARE | Age: 55
End: 2020-08-22
Payer: COMMERCIAL

## 2020-08-22 VITALS
OXYGEN SATURATION: 96 % | HEART RATE: 98 BPM | TEMPERATURE: 99.1 F | DIASTOLIC BLOOD PRESSURE: 80 MMHG | SYSTOLIC BLOOD PRESSURE: 114 MMHG | WEIGHT: 239 LBS | BODY MASS INDEX: 37.54 KG/M2

## 2020-08-22 DIAGNOSIS — M25.472 ANKLE EDEMA, BILATERAL: ICD-10-CM

## 2020-08-22 DIAGNOSIS — S93.432A SPRAIN OF TIBIOFIBULAR LIGAMENT OF LEFT ANKLE, INITIAL ENCOUNTER: Primary | ICD-10-CM

## 2020-08-22 DIAGNOSIS — M25.471 ANKLE EDEMA, BILATERAL: ICD-10-CM

## 2020-08-22 PROCEDURE — 99214 OFFICE O/P EST MOD 30 MIN: CPT | Performed by: FAMILY MEDICINE

## 2020-08-22 PROCEDURE — 73610 X-RAY EXAM OF ANKLE: CPT | Mod: LT

## 2020-08-22 PROCEDURE — 73630 X-RAY EXAM OF FOOT: CPT | Mod: LT

## 2020-08-23 NOTE — PROGRESS NOTES
SUBJECTIVE:  Chief Complaint   Patient presents with     Urgent Care     Leg Swelling     both legs/ankles swollen- on going and getting worse- fell off her bed 2 days ago      Jae Ramos is a 55 year old female  who complains of inversion injury to the left ankle foot 2 days ago.  She has increased difficulty walking  Due to pain.  She has Schizoaffective disorder with limited verbal ability.  Her level of activity at home is limited and less with her left ankle pain  Also has pain over the dorsal lateral left foot.  Immediate symptoms: immediate pain, immediate swelling, was able to bear weight directly after injury, no deformity was noted by the patient. Symptoms have been unchanged since that time. Prior history of related problems: no prior problems with this area in the past. There is pain and swelling at the lateral aspect of that ankle and foot    She has been developing bilateral pedal edema-  But now swelling is worse on the left with the ankle sprain    Past Medical History:   Diagnosis Date     Hypothyroidism      Patient Active Problem List   Diagnosis     History of behavioral and mental health problems     Acquired hypothyroidism     Hypercholesteremia     Type 2 diabetes mellitus (H)     Gastroesophageal reflux disease without esophagitis     Schizoaffective disorder, bipolar type (H)     Obesity (BMI 35.0-39.9) with comorbidity (H)       ALLERGIES:  Patient has no known allergies.    MEDs  atorvastatin (LIPITOR) 40 MG tablet, Take 1 tablet (40 mg) by mouth daily  KWADWO CONTOUR test strip, 1 strip by In Vitro route daily as needed   divalproex sodium extended-release (DEPAKOTE ER) 500 MG 24 hr tablet, Take 2 tablets (1,000 mg) by mouth At Bedtime  haloperidol (HALDOL) 5 MG tablet, Take 2.5 mg by mouth At Bedtime  hydrocortisone (ANUSOL-HC) 2.5 % cream, Place rectally 2 times daily as needed for hemorrhoids  ibuprofen (ADVIL/MOTRIN) 200 MG capsule, Take 1 capsule (200 mg) by mouth daily as  needed for pain  Incontinence Supply Disposable (PROTECTIVE UNDERWEAR XL) MISC, 4-6 per day as needed  levothyroxine (SYNTHROID/LEVOTHROID) 75 MCG tablet, Take 1 tablet (75 mcg) by mouth daily  omeprazole (PRILOSEC) 40 MG DR capsule, Take 1 capsule (40 mg) by mouth daily  polyethylene glycol (MIRALAX/GLYCOLAX) packet, Take 17 g by mouth daily  prazosin (MINIPRESS) 1 MG capsule,   QUEtiapine (SEROQUEL) 100 MG tablet, Take 1 tablet (100 mg) by mouth daily Take along with 400 mg tab=450 mg daily. Can take an extra 50 mg tab as needed  QUEtiapine (SEROQUEL) 400 MG tablet, Take 400 mg by mouth daily Take along with 50 mg tab=450 mg daily  topiramate (TOPAMAX) 50 MG tablet, Take 150 mg by mouth At Bedtime     No current facility-administered medications on file prior to visit.       Social History     Tobacco Use     Smoking status: Never Smoker     Smokeless tobacco: Never Used   Substance Use Topics     Alcohol use: No       Family History   Problem Relation Age of Onset     No Known Problems Mother      No Known Problems Father          ROS:  CONSTITUTIONAL:NEGATIVE for fever, chills,    INTEGUMENTARY/SKIN: NEGATIVE for worrisome rashes,  or lesions  EYES: NEGATIVE for vision changes or irritation  ENT/MOUTH: NEGATIVE for ear, mouth and throat problems  RESP:NEGATIVE for significant cough or SOB  GI: NEGATIVE for nausea, abdominal pain , or change in bowel habits      OBJECTIVE:  /80 (BP Location: Right arm)   Pulse 98   Temp 99.1  F (37.3  C) (Tympanic)   Wt 108.4 kg (239 lb)   SpO2 96%   BMI 37.54 kg/m    GENERAL:    , in moderate distress due to foot pain-  Walks slowly, shuffling gait, minimally interactive  MUSCULOSKELETAL:  left ankle and foot -  Winces with palpation and stress to  Medial and lateral malleoli  There is swelling and tenderness over the lateral malleolus.  Mild tenderness anterior ankle  No tenderness over the   posterior aspect of the ankle.  Swelling and tenderness over the  dorsolateral foot   The fifth metatarsal is not tender.   The ankle joint is intact without excessive opening on stressing.   The rest of the foot, ankle and leg exam is normal.     VASCULAR:  The foot is warm with palpable pulses,  capillary refill 2 seconds in the toes  Ankle pitting edema bilaterally  SKIN:  No rashes, no sores, no open wounds  NEURO:  Motor and sensory normal distal to the injury-  Able to move all the toes    Speech not responsive to questions  and mentation minimally focused on exam        EYES: EOMI,   conjunctiva clear  HENT: External ears with no swelling or lesions   Nose and lips without  Swelling, ulcers, erythema or lesions  NECK: normal pain free ROM  RESP: no labored respirations, no tachypnea  PSYCH: mentation and affect appears flat, minimally responsive       X-ray:  left Foot and ankle  no fracture or dislocation noted, pending review by Radiologist   x-ray read by me Alessandra Schafer MD    ASSESSMENT:  Sprain of tibiofibular ligament of left ankle, initial encounter     - XR Foot Left G/E 3 Views  - XR Ankle Left G/E 3 Views  - Ankle/Foot Bracing Supplies Order for DME - ONLY FOR DME  Stirrup splint placed-  She was able to walk better with the splint in place    Ankle edema, bilateral    Discussed that her psych meds and inactivity, and some venous insufficiency contribute to ankle edema.  Given ace wraps to reduce edema of right leg

## 2020-08-23 NOTE — PATIENT INSTRUCTIONS
Patient Education     Understanding Ankle Sprain    The ankle is the joint where the leg and foot meet. Bones are held in place by connective tissue called ligaments. When ankle ligaments are stretched to the point of pain and injury, it is called an ankle sprain. A sprain can tear the ligaments. These tears can be very small but still cause pain. Ankle sprains can be mild or severe.  What causes an ankle sprain?  A sprain may occur when you twist your ankle or bend it too far. This can happen when you stumble or fall. Things that can make an ankle sprain more likely include:    Having had an ankle sprain before    Playing sports that involve running and jumping. Or playing contact sports such as football or hockey.    Wearing shoes that don t support your feet and ankles well    Having ankles with poor strength and flexibility  Symptoms of an ankle sprain  Symptoms may include:    Pain or soreness in the ankle    Swelling    Redness or bruising    Not being able to walk or put weight on the affected foot    Reduced range of motion in the ankle    A popping or tearing feeling at the time the sprain occurs    An abnormal or dislocated look to the ankle    Instability or too much range of motion in the ankle  Treatment for an ankle sprain  Treatment focuses on reducing pain and swelling, and avoiding further injury. Treatments may include:    Resting the ankle. Avoid putting weight on it. This may mean using crutches until the sprain heals.    Prescription or over-the-counter pain medicines. These help reduce swelling and pain.    Cold packs. These help reduce pain and swelling.    Raising your ankle above your heart. This helps reduce swelling.    Wrapping the ankle with an elastic bandage or ankle brace. This helps reduce swelling and gives some support to the ankle. In rare cases, you may need a cast or boot.    Stretching and other exercises. These improve flexibility and strength.    Heat packs. These may be  recommended before doing ankle exercises.  Possible complications of an ankle sprain  An ankle that has been weakened by a sprain can be more likely to have repeated sprains afterward. Doing exercises to strengthen your ankle and improve balance can reduce your risk for repeated sprains. Other possible complications are long-term (chronic) pain or an ankle that remains unstable.  When to call your healthcare provider  Call your healthcare provider right away if you have any of these:    Fever of 100.4 F (38 C) or higher, or as directed    Pain, numbness, discoloration, or coldness in the foot or toes    Pain that gets worse    Symptoms that don t get better, or get worse    New symptoms   Date Last Reviewed: 3/10/2016    4725-1645 [email or web address]. [address] [place] [address], [place] [address]. All rights reserved. This information is not intended as a substitute for professional medical care. Always follow your healthcare professional's instructions.

## 2020-08-26 ENCOUNTER — APPOINTMENT (OUTPATIENT)
Dept: INTERPRETER SERVICES | Facility: CLINIC | Age: 55
End: 2020-08-26
Payer: COMMERCIAL

## 2020-08-26 ENCOUNTER — TELEPHONE (OUTPATIENT)
Dept: PEDIATRICS | Facility: CLINIC | Age: 55
End: 2020-08-26

## 2020-08-26 DIAGNOSIS — E11.9 TYPE 2 DIABETES MELLITUS WITHOUT COMPLICATION, WITHOUT LONG-TERM CURRENT USE OF INSULIN (H): ICD-10-CM

## 2020-08-26 DIAGNOSIS — E03.9 ACQUIRED HYPOTHYROIDISM: ICD-10-CM

## 2020-08-26 DIAGNOSIS — E78.00 HYPERCHOLESTEREMIA: ICD-10-CM

## 2020-08-26 LAB
ALBUMIN SERPL-MCNC: 2.9 G/DL (ref 3.4–5)
ALP SERPL-CCNC: 105 U/L (ref 40–150)
ALT SERPL W P-5'-P-CCNC: 19 U/L (ref 0–50)
ANION GAP SERPL CALCULATED.3IONS-SCNC: 7 MMOL/L (ref 3–14)
AST SERPL W P-5'-P-CCNC: 18 U/L (ref 0–45)
BILIRUB SERPL-MCNC: 0.3 MG/DL (ref 0.2–1.3)
BUN SERPL-MCNC: 11 MG/DL (ref 7–30)
CALCIUM SERPL-MCNC: 9.1 MG/DL (ref 8.5–10.1)
CHLORIDE SERPL-SCNC: 103 MMOL/L (ref 94–109)
CHOLEST SERPL-MCNC: 184 MG/DL
CO2 SERPL-SCNC: 28 MMOL/L (ref 20–32)
CREAT SERPL-MCNC: 0.61 MG/DL (ref 0.52–1.04)
CREAT UR-MCNC: 140 MG/DL
GFR SERPL CREATININE-BSD FRML MDRD: >90 ML/MIN/{1.73_M2}
GLUCOSE SERPL-MCNC: 141 MG/DL (ref 70–99)
HBA1C MFR BLD: 6.6 % (ref 0–5.6)
HDLC SERPL-MCNC: 45 MG/DL
LDLC SERPL CALC-MCNC: 82 MG/DL
MICROALBUMIN UR-MCNC: 11 MG/L
MICROALBUMIN/CREAT UR: 7.93 MG/G CR (ref 0–25)
NONHDLC SERPL-MCNC: 139 MG/DL
POTASSIUM SERPL-SCNC: 4.5 MMOL/L (ref 3.4–5.3)
PROT SERPL-MCNC: 8 G/DL (ref 6.8–8.8)
SODIUM SERPL-SCNC: 138 MMOL/L (ref 133–144)
T4 FREE SERPL-MCNC: 0.99 NG/DL (ref 0.76–1.46)
TRIGL SERPL-MCNC: 284 MG/DL
TSH SERPL DL<=0.005 MIU/L-ACNC: 4.18 MU/L (ref 0.4–4)

## 2020-08-26 PROCEDURE — 83036 HEMOGLOBIN GLYCOSYLATED A1C: CPT | Performed by: INTERNAL MEDICINE

## 2020-08-26 PROCEDURE — 84439 ASSAY OF FREE THYROXINE: CPT | Performed by: INTERNAL MEDICINE

## 2020-08-26 PROCEDURE — 36415 COLL VENOUS BLD VENIPUNCTURE: CPT | Performed by: INTERNAL MEDICINE

## 2020-08-26 PROCEDURE — 80061 LIPID PANEL: CPT | Performed by: INTERNAL MEDICINE

## 2020-08-26 PROCEDURE — 80053 COMPREHEN METABOLIC PANEL: CPT | Performed by: INTERNAL MEDICINE

## 2020-08-26 PROCEDURE — 84443 ASSAY THYROID STIM HORMONE: CPT | Performed by: INTERNAL MEDICINE

## 2020-08-26 PROCEDURE — 82043 UR ALBUMIN QUANTITATIVE: CPT | Performed by: INTERNAL MEDICINE

## 2020-08-26 NOTE — RESULT ENCOUNTER NOTE
Please call family/caregiver ( needed) with lab results.    Thyroid labs are slightly off, but we do not need to change her dose at this time.  Please reinforce that she should be taking her levothyroxine medication consistently in the morning on an empty stomach, at least 30 to 60 minutes before food and should not be given within 4 hours of calcium- or iron-containing products.  This way the absorption will be better.  We should recheck her levels in 2 months.    The remainder of her labs are stable.    Charmaine Velez MD

## 2020-08-26 NOTE — TELEPHONE ENCOUNTER
Called with  and relayed instructions. Daughter expressed understanding. Will recheck as needed.Lulu Cooper RN on 8/26/2020 at 4:44 PM

## 2020-08-26 NOTE — TELEPHONE ENCOUNTER
----- Message from Charmaine Velez MD sent at 8/26/2020  4:20 PM CDT -----  Please call family/caregiver ( needed) with lab results.    Thyroid labs are slightly off, but we do not need to change her dose at this time.  Please reinforce that she should be taking her levothyroxine medication consistently in the morning on an empty stomach, at least 30 to 60 minutes before food and should not be given within 4 hours of calcium- or iron-containing products.  This way the absorption will be better.  We should recheck her levels in 2 months.    The remainder of her labs are stable.    Charmaine Velez MD

## 2020-08-30 DIAGNOSIS — E03.9 ACQUIRED HYPOTHYROIDISM: ICD-10-CM

## 2020-09-01 RX ORDER — LEVOTHYROXINE SODIUM 75 UG/1
TABLET ORAL
Qty: 90 TABLET | Refills: 1 | Status: SHIPPED | OUTPATIENT
Start: 2020-09-01 | End: 2021-02-24

## 2021-02-12 ENCOUNTER — TELEPHONE (OUTPATIENT)
Dept: PEDIATRICS | Facility: CLINIC | Age: 56
End: 2021-02-12

## 2021-02-12 NOTE — TELEPHONE ENCOUNTER
We received a fax from patient's , Julia Higuera with [facility] requesting a list of patient's dx with ICD10 codes. Fax included an GUILLERMO which was signed.     List faxed back to Julia at [phone].    BRIAN SAUCEDO MA on 2/12/2021 at 11:57 AM

## 2021-03-15 DIAGNOSIS — E03.9 ACQUIRED HYPOTHYROIDISM: ICD-10-CM

## 2021-03-15 PROCEDURE — 84443 ASSAY THYROID STIM HORMONE: CPT | Performed by: INTERNAL MEDICINE

## 2021-03-15 PROCEDURE — 36415 COLL VENOUS BLD VENIPUNCTURE: CPT | Performed by: INTERNAL MEDICINE

## 2021-03-16 LAB — TSH SERPL DL<=0.005 MIU/L-ACNC: 2.1 MU/L (ref 0.4–4)

## 2021-06-14 DIAGNOSIS — E03.9 ACQUIRED HYPOTHYROIDISM: ICD-10-CM

## 2021-06-16 RX ORDER — LEVOTHYROXINE SODIUM 75 UG/1
TABLET ORAL
Qty: 90 TABLET | Refills: 0 | Status: SHIPPED | OUTPATIENT
Start: 2021-06-16 | End: 2021-09-09

## 2021-07-07 DIAGNOSIS — K21.9 GASTROESOPHAGEAL REFLUX DISEASE WITHOUT ESOPHAGITIS: ICD-10-CM

## 2021-07-08 RX ORDER — OMEPRAZOLE 40 MG/1
CAPSULE, DELAYED RELEASE ORAL
Qty: 90 CAPSULE | Refills: 0 | Status: SHIPPED | OUTPATIENT
Start: 2021-07-08 | End: 2021-09-16

## 2021-07-08 NOTE — TELEPHONE ENCOUNTER
Prescription approved per [facility] Refill Protocol.    Melissa Lal RN on 7/8/2021 at 12:08 PM

## 2021-09-09 DIAGNOSIS — E03.9 ACQUIRED HYPOTHYROIDISM: ICD-10-CM

## 2021-09-09 RX ORDER — LEVOTHYROXINE SODIUM 75 UG/1
TABLET ORAL
Qty: 90 TABLET | Refills: 0 | Status: SHIPPED | OUTPATIENT
Start: 2021-09-09 | End: 2021-09-16

## 2021-09-09 NOTE — TELEPHONE ENCOUNTER
Patient has upcoming appointment 9/16/21. Prescription approved per [facility] Refill Protocol.  Richard PATEL RN

## 2021-09-16 ENCOUNTER — OFFICE VISIT (OUTPATIENT)
Dept: PEDIATRICS | Facility: CLINIC | Age: 56
End: 2021-09-16
Payer: COMMERCIAL

## 2021-09-16 VITALS
OXYGEN SATURATION: 94 % | BODY MASS INDEX: 37.56 KG/M2 | SYSTOLIC BLOOD PRESSURE: 124 MMHG | DIASTOLIC BLOOD PRESSURE: 80 MMHG | RESPIRATION RATE: 24 BRPM | WEIGHT: 233.7 LBS | TEMPERATURE: 100 F | HEIGHT: 66 IN | HEART RATE: 105 BPM

## 2021-09-16 DIAGNOSIS — E78.00 HYPERCHOLESTEREMIA: ICD-10-CM

## 2021-09-16 DIAGNOSIS — E11.9 TYPE 2 DIABETES MELLITUS WITHOUT COMPLICATION, WITHOUT LONG-TERM CURRENT USE OF INSULIN (H): ICD-10-CM

## 2021-09-16 DIAGNOSIS — K21.9 GASTROESOPHAGEAL REFLUX DISEASE WITHOUT ESOPHAGITIS: ICD-10-CM

## 2021-09-16 DIAGNOSIS — R60.0 PEDAL EDEMA: ICD-10-CM

## 2021-09-16 DIAGNOSIS — F25.0 SCHIZOAFFECTIVE DISORDER, BIPOLAR TYPE (H): ICD-10-CM

## 2021-09-16 DIAGNOSIS — Z00.00 ROUTINE GENERAL MEDICAL EXAMINATION AT A HEALTH CARE FACILITY: Primary | ICD-10-CM

## 2021-09-16 DIAGNOSIS — Z12.11 COLON CANCER SCREENING: ICD-10-CM

## 2021-09-16 DIAGNOSIS — E03.9 ACQUIRED HYPOTHYROIDISM: ICD-10-CM

## 2021-09-16 DIAGNOSIS — Z13.220 SCREENING FOR HYPERLIPIDEMIA: ICD-10-CM

## 2021-09-16 LAB — HBA1C MFR BLD: 6.4 % (ref 0–5.6)

## 2021-09-16 PROCEDURE — 90471 IMMUNIZATION ADMIN: CPT | Performed by: NURSE PRACTITIONER

## 2021-09-16 PROCEDURE — 80061 LIPID PANEL: CPT | Performed by: NURSE PRACTITIONER

## 2021-09-16 PROCEDURE — 36415 COLL VENOUS BLD VENIPUNCTURE: CPT | Performed by: NURSE PRACTITIONER

## 2021-09-16 PROCEDURE — 82043 UR ALBUMIN QUANTITATIVE: CPT | Performed by: NURSE PRACTITIONER

## 2021-09-16 PROCEDURE — 83036 HEMOGLOBIN GLYCOSYLATED A1C: CPT | Performed by: NURSE PRACTITIONER

## 2021-09-16 PROCEDURE — 99396 PREV VISIT EST AGE 40-64: CPT | Mod: 25 | Performed by: NURSE PRACTITIONER

## 2021-09-16 PROCEDURE — 99213 OFFICE O/P EST LOW 20 MIN: CPT | Mod: 25 | Performed by: NURSE PRACTITIONER

## 2021-09-16 PROCEDURE — 80048 BASIC METABOLIC PNL TOTAL CA: CPT | Performed by: NURSE PRACTITIONER

## 2021-09-16 PROCEDURE — 90682 RIV4 VACC RECOMBINANT DNA IM: CPT | Performed by: NURSE PRACTITIONER

## 2021-09-16 RX ORDER — LEVOTHYROXINE SODIUM 75 UG/1
TABLET ORAL
Qty: 90 TABLET | Refills: 3 | Status: SHIPPED | OUTPATIENT
Start: 2021-09-16 | End: 2021-12-10

## 2021-09-16 RX ORDER — ATORVASTATIN CALCIUM 40 MG/1
40 TABLET, FILM COATED ORAL DAILY
Qty: 90 TABLET | Refills: 3 | Status: SHIPPED | OUTPATIENT
Start: 2021-09-16 | End: 2021-12-10

## 2021-09-16 RX ORDER — OMEPRAZOLE 40 MG/1
40 CAPSULE, DELAYED RELEASE ORAL DAILY
Qty: 90 CAPSULE | Refills: 3 | Status: SHIPPED | OUTPATIENT
Start: 2021-09-16 | End: 2021-12-10

## 2021-09-16 RX ORDER — HYDROCHLOROTHIAZIDE 12.5 MG/1
12.5 TABLET ORAL DAILY
Qty: 30 TABLET | Refills: 3 | Status: SHIPPED | OUTPATIENT
Start: 2021-09-16 | End: 2021-12-10

## 2021-09-16 RX ORDER — TRAZODONE HYDROCHLORIDE 50 MG/1
TABLET, FILM COATED ORAL
COMMUNITY
Start: 2021-02-02

## 2021-09-16 ASSESSMENT — MIFFLIN-ST. JEOR: SCORE: 1661.81

## 2021-09-16 NOTE — PROGRESS NOTES
"     SUBJECTIVE:   CC: Jae Ramos is an 56 year old woman who presents for preventive health visit.       Patient has been advised of split billing requirements and indicates understanding: Yes  Healthy Habits:    Getting at least 3 servings of Calcium per day:  Yes    Bi-annual eye exam:  Yes    Dental care twice a year:  Yes    Sleep apnea or symptoms of sleep apnea:  None    Diet:  Regular (no restrictions)    Frequency of exercise:  None    Duration of exercise:  N/A    Taking medications regularly:  Yes    Barriers to taking medications:  None    Medication side effects:  None    PHQ-2 Total Score:    Additional concerns today:  No    She is here today with her daughter, Ibaoo  Psychiatrist virtual d/t Covid, new psychiatrist but same location   ([facility] records in chart)  Daughter helps with all ADLs  She reports language comprehension and verbiage is poor    Refuses her mammo  Daughter brought her in and she refused  Declines pap  Daughter would like stool test for H pylori  Family members have had H pylori  No complains of abdominal pain, heart burn, N/V, change in weight    Struggles with edema for years  Stable  Had low potassium previously when on \"water pills\" per daughter      Today's PHQ-2 Score:   PHQ-2 ( 1999 Pf[employer]) 10/22/2018   Q1: Little interest or pleasure in doing things 3   Q2: Feeling down, depressed or hopeless 3   PHQ-2 Score 6   Q1: Little interest or pleasure in doing things Nearly every day   Q2: Feeling down, depressed or hopeless Nearly every day   PHQ-2 Score 6       Abuse: Current or Past (Physical, Sexual or Emotional) - No  Do you feel safe in your environment? Yes    Have you ever done Advance Care Planning? (For example, a Health Directive, POLST, or a discussion with a medical provider or your loved ones about your wishes): No, advance care planning information given to patient to review.  Patient plans to discuss their wishes with loved ones or provider.      Social " History     Tobacco Use     Smoking status: Never Smoker     Smokeless tobacco: Never Used   Substance Use Topics     Alcohol use: No     If you drink alcohol do you typically have >3 drinks per day or >7 drinks per week? No    Alcohol Use 10/22/2018   Prescreen: >3 drinks/day or >7 drinks/week? Not Applicable   Prescreen: >3 drinks/day or >7 drinks/week? -       Reviewed orders with patient.  Reviewed health maintenance and updated orders accordingly - Yes  Lab work is in process  Labs reviewed in EPIC  BP Readings from Last 3 Encounters:   09/16/21 124/80   08/22/20 114/80   02/05/20 122/80    Wt Readings from Last 3 Encounters:   09/16/21 106 kg (233 lb 11.2 oz)   08/22/20 108.4 kg (239 lb)   02/05/20 106.7 kg (235 lb 4.8 oz)                  Patient Active Problem List   Diagnosis     History of behavioral and mental health problems     Acquired hypothyroidism     Hypercholesteremia     Type 2 diabetes mellitus (H)     Gastroesophageal reflux disease without esophagitis     Schizoaffective disorder, bipolar type (H)     Obesity (BMI 35.0-39.9) with comorbidity (H)     No past surgical history on file.    Social History     Tobacco Use     Smoking status: Never Smoker     Smokeless tobacco: Never Used   Substance Use Topics     Alcohol use: No     Family History   Problem Relation Age of Onset     No Known Problems Mother      No Known Problems Father          Current Outpatient Medications   Medication Sig Dispense Refill     atorvastatin (LIPITOR) 40 MG tablet Take 1 tablet (40 mg) by mouth daily 90 tablet 3     hydrochlorothiazide (HYDRODIURIL) 12.5 MG tablet Take 1 tablet (12.5 mg) by mouth daily 30 tablet 3     levothyroxine (SYNTHROID/LEVOTHROID) 75 MCG tablet TAKE 1 TABLET(75 MCG) BY MOUTH DAILY 90 tablet 3     omeprazole (PRILOSEC) 40 MG DR capsule Take 1 capsule (40 mg) by mouth daily 90 capsule 3     KWADWO CONTOUR test strip 1 strip by In Vitro route daily as needed        divalproex sodium  extended-release (DEPAKOTE ER) 500 MG 24 hr tablet Take 2 tablets (1,000 mg) by mouth At Bedtime 60 tablet 2     hydrocortisone (ANUSOL-HC) 2.5 % cream Place rectally 2 times daily as needed for hemorrhoids 30 g 1     ibuprofen (ADVIL/MOTRIN) 200 MG capsule Take 1 capsule (200 mg) by mouth daily as needed for pain 30 capsule 1     Incontinence Supply Disposable (PROTECTIVE UNDERWEAR XL) MISC 4-6 per day as needed 180 each 11     prazosin (MINIPRESS) 1 MG capsule        QUEtiapine (SEROQUEL) 100 MG tablet Take 1 tablet (100 mg) by mouth daily Take along with 400 mg tab=450 mg daily. Can take an extra 50 mg tab as needed       QUEtiapine (SEROQUEL) 400 MG tablet Take 400 mg by mouth daily Take along with 50 mg tab=450 mg daily  5     topiramate (TOPAMAX) 50 MG tablet Take 150 mg by mouth At Bedtime   5     traZODone (DESYREL) 50 MG tablet          Breast Cancer Screening:  Any new diagnosis of family breast, ovarian, or bowel cancer? No    FHS-7: No flowsheet data found.  click delete button to remove this line now  Mammogram Screening: Recommended annual mammography  Pertinent mammograms are reviewed under the imaging tab.    History of abnormal Pap smear: NO - age 30-65 PAP every 5 years with negative HPV co-testing recommended     Reviewed and updated as needed this visit by clinical staff                 Reviewed and updated as needed this visit by Provider                Past Medical History:   Diagnosis Date     Hypothyroidism         Review of Systems  CONSTITUTIONAL: NEGATIVE for fever, chills, change in weight  INTEGUMENTARY/SKIN: NEGATIVE for worrisome rashes, moles or lesions  EYES: NEGATIVE for vision changes or irritation  ENT: NEGATIVE for ear, mouth and throat problems  RESP: NEGATIVE for significant cough or SOB  BREAST: NEGATIVE for masses, tenderness or discharge  CV: NEGATIVE for chest pain, palpitations or peripheral edema  GI: NEGATIVE for nausea, abdominal pain, heartburn, or change in bowel  "habits  : NEGATIVE for unusual urinary or vaginal symptoms. No vaginal bleeding.  MUSCULOSKELETAL: NEGATIVE for significant arthralgias or myalgia  NEURO: NEGATIVE for weakness, dizziness or paresthesias  PSYCHIATRIC: NEGATIVE for changes in mood or affect      OBJECTIVE:   /80 (BP Location: Right arm, Patient Position: Chair, Cuff Size: Adult Regular)   Pulse 105   Temp 100  F (37.8  C) (Tympanic)   Resp 24   Ht 1.676 m (5' 6\")   Wt 106 kg (233 lb 11.2 oz)   SpO2 94%   BMI 37.72 kg/m    Physical Exam  GENERAL: healthy, alert and no distress  EYES: Eyes grossly normal to inspection, PERRL and conjunctivae and sclerae normal  HENT: ear canals and TM's normal, nose and mouth without ulcers or lesions  NECK: no adenopathy, no asymmetry, masses, or scars and thyroid normal to palpation  RESP: lungs clear to auscultation - no rales, rhonchi or wheezes  CV: regular rate and rhythm, normal S1 S2, no S3 or S4, no murmur, click or rub, moderate non pitting lower extremity edema  ABDOMEN: soft, nontender, no hepatosplenomegaly, no masses and bowel sounds normal  MS: no gross musculoskeletal defects noted, no edema  SKIN: no suspicious lesions or rashes  NEURO: Normal strength and tone, mentation intact and speech normal  PSYCH: affect bright, making utterances, but not thoroughly communicating with daughter,  or me    Diagnostic Test Results:  Labs reviewed in Epic    ASSESSMENT/PLAN:       ICD-10-CM    1. Routine general medical examination at a health care facility  Z00.00    2. Screening for hyperlipidemia  Z13.220 Lipid panel reflex to direct LDL Fasting     Lipid panel reflex to direct LDL Fasting   3. Schizoaffective disorder, bipolar type (H)  F25.0    4. Type 2 diabetes mellitus without complication, without long-term current use of insulin (H)  E11.9 HEMOGLOBIN A1C     BASIC METABOLIC PANEL     Albumin Random Urine Quantitative with Creat Ratio     HEMOGLOBIN A1C     BASIC METABOLIC PANEL    " " Albumin Random Urine Quantitative with Creat Ratio   5. Acquired hypothyroidism  E03.9 levothyroxine (SYNTHROID/LEVOTHROID) 75 MCG tablet   6. Hypercholesteremia  E78.00 atorvastatin (LIPITOR) 40 MG tablet   7. Gastroesophageal reflux disease without esophagitis  K21.9 omeprazole (PRILOSEC) 40 MG DR capsule     Helicobacter pylori Antigen Stool     Helicobacter pylori Antigen Stool   8. Colon cancer screening  Z12.11 Fecal colorectal cancer screen (FIT)     Fecal colorectal cancer screen (FIT)   9. Pedal edema  R60.0 hydrochlorothiazide (HYDRODIURIL) 12.5 MG tablet     Basic metabolic panel  (Ca, Cl, CO2, Creat, Gluc, K, Na, BUN)     CANCELED: Basic metabolic panel  (Ca, Cl, CO2, Creat, Gluc, K, Na, BUN)     Declines pap and mammo  A1c stable. Continue with medication  She had normal ECHO in 2016 for edema. EF 65%  Previously had hypokalemia with diuretic, though unsure what she tried and how low. Will try low dose hydrochlorothiazide and BMP and BP check in 2 weeks      Patient has been advised of split billing requirements and indicates understanding: Yes  COUNSELING:  Reviewed preventive health counseling, as reflected in patient instructions       Regular exercise       Healthy diet/nutrition    Estimated body mass index is 37.54 kg/m  as calculated from the following:    Height as of 1/2/20: 1.699 m (5' 6.9\").    Weight as of 8/22/20: 108.4 kg (239 lb).    Weight management plan: Discussed healthy diet and exercise guidelines    She reports that she has never smoked. She has never used smokeless tobacco.      Counseling Resources:  ATP IV Guidelines  Pooled Cohorts Equation Calculator  Breast Cancer Risk Calculator  BRCA-Related Cancer Risk Assessment: FHS-7 Tool  FRAX Risk Assessment  ICSI Preventive Guidelines  Dietary Guidelines for Americans, 2010  USDA's MyPlate  ASA Prophylaxis  Lung CA Screening    TRANG Sullivan CNP  M [facility] [place]  "

## 2021-09-16 NOTE — LETTER
[facility] [place]  [address]  [place] [address]                  [phone]   September 22, 2021    Jae Ramos  [address] [place] [address]      Dear Jae,    Here is a summary of your recent test results:    Your cholesterol is very elevated. I don't believe you were fasting, but it has increased from previous years. Reduce your consumption of cholesterol and saturated fats and eliminate trans fats from your diet. Increase your consumption of healthy fats (nuts, fish, seafood, avocado, olive oil), whole grains and fruits and vegetables. More physical activity will help as well. Cut back on processed sugars to reduce triglycerides.   A1c is stable at 6.4%. God work!   Kidney function is stable.   Ketty Michael CNP           Results for orders placed or performed in visit on 09/16/21   HEMOGLOBIN A1C     Status: Abnormal   Result Value Ref Range    Hemoglobin A1C 6.4 (H) 0.0 - 5.6 %   BASIC METABOLIC PANEL     Status: Abnormal   Result Value Ref Range    Sodium 139 133 - 144 mmol/L    Potassium 4.3 3.4 - 5.3 mmol/L    Chloride 103 94 - 109 mmol/L    Carbon Dioxide (CO2) 32 20 - 32 mmol/L    Anion Gap 4 3 - 14 mmol/L    Urea Nitrogen 8 7 - 30 mg/dL    Creatinine 0.66 0.52 - 1.04 mg/dL    Calcium 8.9 8.5 - 10.1 mg/dL    Glucose 113 (H) 70 - 99 mg/dL    GFR Estimate >90 >60 mL/min/1.73m2   Lipid panel reflex to direct LDL Fasting     Status: Abnormal   Result Value Ref Range    Cholesterol 258 (H) <200 mg/dL    Triglycerides 395 (H) <150 mg/dL    Direct Measure HDL 39 (L) >=50 mg/dL    LDL Cholesterol Calculated 140 (H) <=100 mg/dL    Non HDL Cholesterol 219 (H) <130 mg/dL    Patient Fasting > 8hrs? No     Narrative    Cholesterol  Desirable:  <200 mg/dL    Triglycerides  Normal:  Less than 150 mg/dL  Borderline High:  150-199 mg/dL  High:  200-499 mg/dL  Very High:  Greater than or equal to 500 mg/dL    Direct Measure HDL  Female:  Greater than or  equal to 50 mg/dL   Male:  Greater than or equal to 40 mg/dL    LDL Cholesterol  Desirable:  <100mg/dL  Above Desirable:  100-129 mg/dL   Borderline High:  130-159 mg/dL   High:  160-189 mg/dL   Very High:  >= 190 mg/dL    Non HDL Cholesterol  Desirable:  130 mg/dL  Above Desirable:  130-159 mg/dL  Borderline High:  160-189 mg/dL  High:  190-219 mg/dL  Very High:  Greater than or equal to 220 mg/dL   Albumin Random Urine Quantitative with Creat Ratio     Status: None   Result Value Ref Range    Creatinine Urine mg/dL 166 mg/dL    Albumin Urine mg/L 5 mg/L    Albumin Urine mg/g Cr 3.01 0.00 - 25.00 mg/g Cr

## 2021-09-17 ENCOUNTER — DOCUMENTATION ONLY (OUTPATIENT)
Dept: OTHER | Facility: CLINIC | Age: 56
End: 2021-09-17

## 2021-09-18 LAB
ANION GAP SERPL CALCULATED.3IONS-SCNC: 4 MMOL/L (ref 3–14)
BUN SERPL-MCNC: 8 MG/DL (ref 7–30)
CALCIUM SERPL-MCNC: 8.9 MG/DL (ref 8.5–10.1)
CHLORIDE BLD-SCNC: 103 MMOL/L (ref 94–109)
CHOLEST SERPL-MCNC: 258 MG/DL
CO2 SERPL-SCNC: 32 MMOL/L (ref 20–32)
CREAT SERPL-MCNC: 0.66 MG/DL (ref 0.52–1.04)
CREAT UR-MCNC: 166 MG/DL
FASTING STATUS PATIENT QL REPORTED: NO
GFR SERPL CREATININE-BSD FRML MDRD: >90 ML/MIN/1.73M2
GLUCOSE BLD-MCNC: 113 MG/DL (ref 70–99)
HDLC SERPL-MCNC: 39 MG/DL
LDLC SERPL CALC-MCNC: 140 MG/DL
MICROALBUMIN UR-MCNC: 5 MG/L
MICROALBUMIN/CREAT UR: 3.01 MG/G CR (ref 0–25)
NONHDLC SERPL-MCNC: 219 MG/DL
POTASSIUM BLD-SCNC: 4.3 MMOL/L (ref 3.4–5.3)
SODIUM SERPL-SCNC: 139 MMOL/L (ref 133–144)
TRIGL SERPL-MCNC: 395 MG/DL

## 2021-09-28 ENCOUNTER — LAB (OUTPATIENT)
Dept: LAB | Facility: CLINIC | Age: 56
End: 2021-09-28
Payer: COMMERCIAL

## 2021-09-28 DIAGNOSIS — E03.9 ACQUIRED HYPOTHYROIDISM: ICD-10-CM

## 2021-09-28 DIAGNOSIS — Z12.11 COLON CANCER SCREENING: ICD-10-CM

## 2021-09-28 DIAGNOSIS — E11.9 TYPE 2 DIABETES MELLITUS WITHOUT COMPLICATION, WITHOUT LONG-TERM CURRENT USE OF INSULIN (H): ICD-10-CM

## 2021-09-28 DIAGNOSIS — R60.0 PEDAL EDEMA: ICD-10-CM

## 2021-09-28 DIAGNOSIS — Z00.00 ROUTINE GENERAL MEDICAL EXAMINATION AT A HEALTH CARE FACILITY: ICD-10-CM

## 2021-09-28 DIAGNOSIS — K21.9 GASTROESOPHAGEAL REFLUX DISEASE WITHOUT ESOPHAGITIS: ICD-10-CM

## 2021-09-28 DIAGNOSIS — F25.0 SCHIZOAFFECTIVE DISORDER, BIPOLAR TYPE (H): ICD-10-CM

## 2021-09-28 DIAGNOSIS — Z13.220 SCREENING FOR HYPERLIPIDEMIA: ICD-10-CM

## 2021-09-28 DIAGNOSIS — E78.00 HYPERCHOLESTEREMIA: ICD-10-CM

## 2021-09-28 PROCEDURE — 82274 ASSAY TEST FOR BLOOD FECAL: CPT

## 2021-10-03 ENCOUNTER — LAB (OUTPATIENT)
Dept: LAB | Facility: CLINIC | Age: 56
End: 2021-10-03
Payer: COMMERCIAL

## 2021-10-03 DIAGNOSIS — K21.9 GASTROESOPHAGEAL REFLUX DISEASE: Primary | ICD-10-CM

## 2021-10-04 PROCEDURE — 87338 HPYLORI STOOL AG IA: CPT

## 2021-10-05 ENCOUNTER — TELEPHONE (OUTPATIENT)
Dept: PEDIATRICS | Facility: CLINIC | Age: 56
End: 2021-10-05

## 2021-10-05 DIAGNOSIS — A04.8 H. PYLORI INFECTION: Primary | ICD-10-CM

## 2021-10-05 LAB — H PYLORI AG STL QL IA: POSITIVE

## 2021-10-05 RX ORDER — BISMUTH SUBSALICYLATE 262 MG/1
1 TABLET, CHEWABLE ORAL
Qty: 40 TABLET | Refills: 0 | Status: CANCELLED | OUTPATIENT
Start: 2021-10-05 | End: 2021-10-15

## 2021-10-05 RX ORDER — TETRACYCLINE HYDROCHLORIDE 500 MG/1
CAPSULE ORAL 2 TIMES DAILY
Status: CANCELLED | OUTPATIENT
Start: 2021-10-05

## 2021-10-06 ENCOUNTER — LAB (OUTPATIENT)
Dept: LAB | Facility: CLINIC | Age: 56
End: 2021-10-06
Payer: COMMERCIAL

## 2021-10-06 DIAGNOSIS — R60.0 PEDAL EDEMA: ICD-10-CM

## 2021-10-06 PROCEDURE — 80048 BASIC METABOLIC PNL TOTAL CA: CPT

## 2021-10-06 PROCEDURE — 36415 COLL VENOUS BLD VENIPUNCTURE: CPT

## 2021-10-07 ENCOUNTER — TELEPHONE (OUTPATIENT)
Dept: PEDIATRICS | Facility: CLINIC | Age: 56
End: 2021-10-07
Payer: COMMERCIAL

## 2021-10-07 NOTE — TELEPHONE ENCOUNTER
Ketty-Patient needs to be seen for positive H. Pylori per you, she can only talk to you over the phone between 12-1 tomorrow, can you accommodate this, there are no other openings with other providers tomorrow at this time, advise then we can call the patient back.

## 2021-10-08 ENCOUNTER — NURSE TRIAGE (OUTPATIENT)
Dept: NURSING | Facility: CLINIC | Age: 56
End: 2021-10-08

## 2021-10-08 LAB
ANION GAP SERPL CALCULATED.3IONS-SCNC: 10 MMOL/L (ref 3–14)
BUN SERPL-MCNC: 6 MG/DL (ref 7–30)
CALCIUM SERPL-MCNC: 9.2 MG/DL (ref 8.5–10.1)
CHLORIDE BLD-SCNC: 95 MMOL/L (ref 94–109)
CO2 SERPL-SCNC: 29 MMOL/L (ref 20–32)
CREAT SERPL-MCNC: 0.48 MG/DL (ref 0.52–1.04)
GFR SERPL CREATININE-BSD FRML MDRD: >90 ML/MIN/1.73M2
GLUCOSE BLD-MCNC: 159 MG/DL (ref 70–99)
POTASSIUM BLD-SCNC: 3.5 MMOL/L (ref 3.4–5.3)
SODIUM SERPL-SCNC: 134 MMOL/L (ref 133–144)

## 2021-10-08 NOTE — TELEPHONE ENCOUNTER
The pt is aware and scheduled for her upcoming appointment.   Ophelia Junior on 10/8/2021 at 8:14 AM

## 2021-10-08 NOTE — TELEPHONE ENCOUNTER
I called and offered to schedule an appointment at this time and the pt declined.   Ophelia Junior on 10/8/2021 at 7:48 AM

## 2021-10-08 NOTE — TELEPHONE ENCOUNTER
Son called back stating they are unable to get scheduled as there are no available appointments for several weeks per his report.     Please call if there is availability for an appointment next week?    Please call son.  Brian Poon (GUARDIAN)[phone]  Leslie Singh RN on 10/8/2021 at 8:08 AM

## 2021-10-14 ENCOUNTER — OFFICE VISIT (OUTPATIENT)
Dept: PEDIATRICS | Facility: CLINIC | Age: 56
End: 2021-10-14
Payer: COMMERCIAL

## 2021-10-14 VITALS
BODY MASS INDEX: 37.61 KG/M2 | SYSTOLIC BLOOD PRESSURE: 110 MMHG | DIASTOLIC BLOOD PRESSURE: 84 MMHG | HEART RATE: 107 BPM | WEIGHT: 233 LBS | OXYGEN SATURATION: 94 % | TEMPERATURE: 97.5 F

## 2021-10-14 DIAGNOSIS — R73.03 PRE-DIABETES: ICD-10-CM

## 2021-10-14 DIAGNOSIS — A04.8 H. PYLORI INFECTION: Primary | ICD-10-CM

## 2021-10-14 DIAGNOSIS — R26.2 DIFFICULTY WALKING: ICD-10-CM

## 2021-10-14 PROCEDURE — 99213 OFFICE O/P EST LOW 20 MIN: CPT | Mod: GE | Performed by: STUDENT IN AN ORGANIZED HEALTH CARE EDUCATION/TRAINING PROGRAM

## 2021-10-14 RX ORDER — AMOXICILLIN 500 MG/1
1000 CAPSULE ORAL 2 TIMES DAILY
Qty: 56 CAPSULE | Refills: 0 | Status: SHIPPED | OUTPATIENT
Start: 2021-10-14 | End: 2021-12-10

## 2021-10-14 RX ORDER — CLARITHROMYCIN 500 MG
500 TABLET ORAL 2 TIMES DAILY
Qty: 28 TABLET | Refills: 0 | Status: SHIPPED | OUTPATIENT
Start: 2021-10-14 | End: 2021-12-10

## 2021-10-14 NOTE — PATIENT INSTRUCTIONS
- Take amoxicillin and clarithromycin (in addition to omeprazole) for 6 weeks.  - Plan for repeat H. Pylori stool test in 6 weeks (that is, 4 weeks after completion of antibiotics) on or around Friday 11/26/2021.    Patient Education     Understanding H. pylori and Ulcers  Ulcers are sores in the lining of your digestive tract. They were once thought to be caused by too much spicy food, stress, or an anxious personality. It's now known that most ulcers are likely due to infection with bacteria known as Helicobacter pylori (H. pylori). They could also be from using anti-inflammatory medicines such as aspirin and NSAIDs. These include ibuprofen and naproxen.      An ulcer can form in two areas of the digestive tract; the stomach and the duodenum (where the stomach meets the small intestine).   Common ulcer symptoms  Symptoms include:    Burning, cramping, or hunger-like pain in the stomach area, often 1 to 3 hours after a meal or in the middle of the night    Pain that gets better or worse with eating    Nausea or vomiting    Black, tarry, or bloody stools (which means the ulcer is bleeding)  Or you may have no symptoms.  Your evaluation  An evaluation by your healthcare provider can show if you have an ulcer and determine whether it was caused by H. pylori. Your healthcare provider may ask you questions, examine you, and possibly do some tests. These may include:     A special X-ray called an upper gastrointestinal series, to help locate an ulcer. Before the test, you drink a chalky liquid, called barium. This liquid helps the ulcer show up on the X-ray.    An endoscopic exam, done with a long tube with a camera on the end. The tube is passed through your mouth into your stomach, and allows the healthcare provider to get a closer look at your ulcer. You will be lightly sedated for this procedure. Your healthcare provider can also take a tissue sample to test for H. pylori.    Blood, stool, and breath tests are also  available to show whether you have H. pylori in your digestive tract.  Your treatment  To kill H. pylori so your ulcer can heal, your healthcare provider will probably prescribe antibiotics. Other ulcer medicines that help reduce stomach acid may also be prescribed as well. Testing after treatment may be recommended to be sure the H. pylori infection is gone. Usually, killing H. pylori helps keep the ulcer from returning. However, you can develop ulcers more than once in your lifetime.    [employer] last reviewed this educational content on 6/1/2019 2000-2021 [employer]. All rights reserved. This information is not intended as a substitute for professional medical care. Always follow your healthcare professional's instructions.

## 2021-10-14 NOTE — PROGRESS NOTES
Assessment & Plan   Ms. Jae Ramos is a Taiwanese-speaking 56-year-old woman with history of schizoaffective disorder (on divalproex, prazosin, quetiapine, and topiramate) who presents to discuss the results of her recent lab work. Hemodynamically, VS WNLs. BMI elevated at 37.6 kg/m^2. Clinically, she does not complain of any symptoms of reflux although she is tender to palpation in the epigastrium on exam. For her positive H. pylori test, we prescribed and instructed the patient to plan to complete a 14-day course of clarithromycin and amoxicillin (in addition to the daily omeprazole she already takes). We placed a future order for repeat H. pylori stool antigen (to test for cure) with instruction to collect and return 4 weeks after completion of antibiotics (i.e., on around 11/26/2021, assuming the patient starts antibiotics today or tomorrow 10/14/2021-10/15/2021).    The patient's daughter who accompanied the patient requested a referral to physical therapy for the patient since the patient reportedly has some difficulty walking. The daughter also alluded to the patient preferring not to walk and mostly spending her days lying in bed or eating. I broached the subject of possible depression but agreed to start with physical therapy prior to consideration of further work-up and treatment for possible depression.    For the patient's pre-diabetes (HgbA1c 6.4 on 09/16/2021 from 6.6 on 08/26/2020 from peak 6.8 on 02/27/2019), we discussed lifestyle modification +/- future initiation of metformin.      ICD-10-CM    1. H. pylori infection  A04.8 clarithromycin (BIAXIN) 500 MG tablet     amoxicillin (AMOXIL) 500 MG capsule     Helicobacter pylori Antigen Stool   2. Difficulty walking  R26.2 Physical Therapy Referral   3. Pre-diabetes  R73.03        50 minutes spent on the date of the encounter doing chart review, history and exam, documentation and further activities per the note  {Provider  Link to [facility] Help Grid  ":[id number]}    Return if symptoms worsen or fail to improve.    Siddhartha Chung MD  PGY-4 Internal Medicine/Pediatrics  Pager [phone]  Thursday 10/14/2021  [employer]    Patient staffed with the attending physician, Dr. Varela.    I have reviewed the documentation from Dr. Chung and discussed the findings with him.  I agree with the documentation of Dr. Chung.      Odilia Varela MD  Internal Medicine - Pediatrics        Subjective   Ms. Jae Ramos is a 56-year-old woman with history of schizoaffective disorder (bipolar subtype) who presents to discuss labs accompanied by her:    - Here with daughter = Mor (patient is daughter's step-mother)  - A phone Cypriot  was used for the duration of visit.    - Note: Patient was mostly whispering under her breath (in Cypriot) for the duration of visit.    - Patient is here today to review H. pylori results.  - Daughter mostly speaks on behalf of patient, although patient can and does occasionally speak up (in Cypriot).  - Per note from recent visit 9/16/2021, patient had \"No complains of abdominal pain, heart burn, N/V, change in weight.\"    - Daughter also concerned that patient \"does not walk.\"  - Mostly just eats then goes back to bed  - According to the patient's daughter, patient previously did physical therapy 2x/week with benefit.  - Patient alluded to some old injuries involving her legs.    Review of Systems   10-point ROS negative except for as listed above or below.      Objective    /84 (BP Location: Right arm, Patient Position: Chair, Cuff Size: Adult Large)   Pulse 107   Temp 97.5  F (36.4  C) (Tympanic)   Wt 105.7 kg (233 lb)   SpO2 94%   BMI 37.61 kg/m    Body mass index is 37.61 kg/m .     Physical Exam   GENERAL: older woman wearing hijab whispering under her breath (in Cypriot) for the duration of visit; awake, alert, in no acute distress  EYES: eyes grossly normal to inspection, PER, and conjunctivae and " sclerae normal  RESP: lungs clear to auscultation - no rales, rhonchi, or wheezes  CV: regular rate and rhythm, normal S1 S2, no S3 or S4, no murmur, click or rub, no peripheral edema and peripheral pulses strong  ABDOMEN: soft, tender to palpation over epigastrium, no masses  MS: no gross musculoskeletal defects noted, no appreciable lower extremity edema  SKIN: no suspicious lesions or rashes on visible skin  NEURO: CNs 2-12 grossly intake, slow to stand  PSYCH: whispering under her breath (in Portuguese) for the duration of visit; poor eye contact    Lab on 10/06/2021   Component Date Value Ref Range Status     Sodium 10/06/2021 134  133 - 144 mmol/L Final     Potassium 10/06/2021 3.5  3.4 - 5.3 mmol/L Final     Chloride 10/06/2021 95  94 - 109 mmol/L Final     Carbon Dioxide (CO2) 10/06/2021 29  20 - 32 mmol/L Final     Anion Gap 10/06/2021 10  3 - 14 mmol/L Final     Urea Nitrogen 10/06/2021 6* 7 - 30 mg/dL Final     Creatinine 10/06/2021 0.48* 0.52 - 1.04 mg/dL Final     Calcium 10/06/2021 9.2  8.5 - 10.1 mg/dL Final     Glucose 10/06/2021 159* 70 - 99 mg/dL Final     GFR Estimate 10/06/2021 >90  >60 mL/min/1.73m2 Final    As of July 11, 2021, eGFR is calculated by the CKD-EPI creatinine equation, without race adjustment. eGFR can be influenced by muscle mass, exercise, and diet. The reported eGFR is an estimation only and is only applicable if the renal function is stable.

## 2021-10-17 LAB — HEMOCCULT STL QL IA: NEGATIVE

## 2021-11-10 ENCOUNTER — HOSPITAL ENCOUNTER (OUTPATIENT)
Dept: PHYSICAL THERAPY | Facility: CLINIC | Age: 56
End: 2021-11-10
Attending: INTERNAL MEDICINE
Payer: COMMERCIAL

## 2021-11-10 DIAGNOSIS — M62.81 GENERALIZED MUSCLE WEAKNESS: Primary | ICD-10-CM

## 2021-11-10 DIAGNOSIS — R26.2 DIFFICULTY WALKING: ICD-10-CM

## 2021-11-10 DIAGNOSIS — R26.89 BALANCE PROBLEMS: ICD-10-CM

## 2021-11-10 PROCEDURE — 97162 PT EVAL MOD COMPLEX 30 MIN: CPT | Mod: GP | Performed by: PHYSICAL THERAPIST

## 2021-11-10 PROCEDURE — 97110 THERAPEUTIC EXERCISES: CPT | Mod: GP | Performed by: PHYSICAL THERAPIST

## 2021-11-12 NOTE — PROGRESS NOTES
[facility]        OUTPATIENT PHYSICAL THERAPY FUNCTIONAL EVALUATION  PLAN OF TREATMENT FOR OUTPATIENT REHABILITATION  (COMPLETE FOR INITIAL CLAIMS ONLY)  Patient's Last Name, First Name, M.I.  YOB: 1965  Jae Ramos     Provider's Name   [facility]   Medical Record No.  [id number]     Start of Care Date:  11/10/21   Onset Date:  10/14/21 (date of order)   Type:     _X__PT   ____OT  ____SLP Medical Diagnosis: Difficulty walking     PT Diagnosis:  Deconditioning, Balance Problems Visits from SOC:  1                              __________________________________________________________________________________  Plan of Treatment/Functional Goals:  balance training,gait training,neuromuscular re-education,ROM,strengthening,stretching,transfer training       GOALS  HEP  Patient with assistance from family members/caregivers will be independent with a home exercise program addressing strength, balance, flexibility, and general conditioning in order to maximize functional independence with ADLs, IADLs, and mobility.  02/07/22    Postural Stability (attempted, did not tolerate)  The patient will be able to maintain rhomberg stance x 30 seconds with eyes opened while demonstrating a normal degree of sway to demonstrate improved postural stability.  02/07/22    6 minute walk test  Patient will be able to complete 6-minute walk test with OMNI score of 4 or less in order to demonstrate improved aerobic conditioning and ability to ambulate in the community.  02/07/22    sit to/from stand (moderate upper extremity support)  Patient will demonstrate improved functional lower extremity strength by being able to complete sit to and from stand with minimal upper extremity support.  02/07/22    Therapy Frequency:  2 times/Week (decrease frequency as appropriate)   Predicted  Duration of Therapy Intervention:  90 days    Daniela Raymundo, PT                                    I CERTIFY THE NEED FOR THESE SERVICES FURNISHED UNDER        THIS PLAN OF TREATMENT AND WHILE UNDER MY CARE     (Physician co-signature of this document indicates review and certification of the therapy plan).                Certification Date From:    11/10/2021  Certification Date To:   2/7/2022    Referring Provider:  Berlin Baca MD    Initial Assessment  See Epic Evaluation- Start of Care Date: 11/10/21
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
## 2021-12-03 DIAGNOSIS — A04.8 H. PYLORI INFECTION: ICD-10-CM

## 2021-12-04 ENCOUNTER — APPOINTMENT (OUTPATIENT)
Dept: LAB | Facility: CLINIC | Age: 56
End: 2021-12-04
Payer: COMMERCIAL

## 2021-12-04 PROCEDURE — 87338 HPYLORI STOOL AG IA: CPT

## 2021-12-06 ENCOUNTER — HOSPITAL ENCOUNTER (OUTPATIENT)
Dept: PHYSICAL THERAPY | Facility: CLINIC | Age: 56
End: 2021-12-06
Payer: COMMERCIAL

## 2021-12-06 DIAGNOSIS — R26.2 DIFFICULTY WALKING: ICD-10-CM

## 2021-12-06 DIAGNOSIS — R26.89 BALANCE PROBLEMS: ICD-10-CM

## 2021-12-06 DIAGNOSIS — M62.81 GENERALIZED MUSCLE WEAKNESS: Primary | ICD-10-CM

## 2021-12-06 LAB — H PYLORI AG STL QL IA: POSITIVE

## 2021-12-06 PROCEDURE — 97112 NEUROMUSCULAR REEDUCATION: CPT | Mod: GP | Performed by: PHYSICAL THERAPIST

## 2021-12-06 PROCEDURE — 97110 THERAPEUTIC EXERCISES: CPT | Mod: GP | Performed by: PHYSICAL THERAPIST

## 2021-12-10 ENCOUNTER — VIRTUAL VISIT (OUTPATIENT)
Dept: PEDIATRICS | Facility: CLINIC | Age: 56
End: 2021-12-10
Payer: COMMERCIAL

## 2021-12-10 DIAGNOSIS — A04.8 INFECTION DUE TO CLARITHROMYCIN RESISTANT HELICOBACTER PYLORI: Primary | ICD-10-CM

## 2021-12-10 DIAGNOSIS — R60.0 PEDAL EDEMA: ICD-10-CM

## 2021-12-10 DIAGNOSIS — E03.9 ACQUIRED HYPOTHYROIDISM: ICD-10-CM

## 2021-12-10 DIAGNOSIS — Z16.29 INFECTION DUE TO CLARITHROMYCIN RESISTANT HELICOBACTER PYLORI: Primary | ICD-10-CM

## 2021-12-10 DIAGNOSIS — K21.9 GASTROESOPHAGEAL REFLUX DISEASE WITHOUT ESOPHAGITIS: ICD-10-CM

## 2021-12-10 DIAGNOSIS — E78.00 HYPERCHOLESTEREMIA: ICD-10-CM

## 2021-12-10 PROCEDURE — 99214 OFFICE O/P EST MOD 30 MIN: CPT | Mod: 95 | Performed by: NURSE PRACTITIONER

## 2021-12-10 RX ORDER — AMOXICILLIN 875 MG
875 TABLET ORAL
Qty: 42 TABLET | Refills: 0 | Status: SHIPPED | OUTPATIENT
Start: 2021-12-10 | End: 2021-12-24

## 2021-12-10 RX ORDER — LEVOFLOXACIN 500 MG/1
500 TABLET, FILM COATED ORAL DAILY
Qty: 14 TABLET | Refills: 0 | Status: SHIPPED | OUTPATIENT
Start: 2021-12-10 | End: 2022-06-08

## 2021-12-10 RX ORDER — ATORVASTATIN CALCIUM 40 MG/1
40 TABLET, FILM COATED ORAL DAILY
Qty: 90 TABLET | Refills: 0 | Status: SHIPPED | OUTPATIENT
Start: 2021-12-10 | End: 2022-03-24

## 2021-12-10 RX ORDER — HYDROCHLOROTHIAZIDE 12.5 MG/1
12.5 TABLET ORAL DAILY
Qty: 90 TABLET | Refills: 0 | Status: SHIPPED | OUTPATIENT
Start: 2021-12-10 | End: 2022-03-24

## 2021-12-10 RX ORDER — LEVOTHYROXINE SODIUM 75 UG/1
TABLET ORAL
Qty: 90 TABLET | Refills: 0 | Status: SHIPPED | OUTPATIENT
Start: 2021-12-10 | End: 2022-03-24

## 2021-12-10 RX ORDER — OMEPRAZOLE 40 MG/1
CAPSULE, DELAYED RELEASE ORAL
Qty: 90 CAPSULE | Refills: 3 | Status: SHIPPED | OUTPATIENT
Start: 2021-12-10 | End: 2022-06-08

## 2021-12-10 NOTE — PATIENT INSTRUCTIONS
It was nice seeing you today.    Please let me know if you have any questions regarding today's visit!    Take care,    JASMINE Davila DNP  Family Medicine

## 2021-12-10 NOTE — PROGRESS NOTES
Jae is a 56 year old who is being evaluated via a billable telephone visit.      What phone number would you like to be contacted at? [phone]  How would you like to obtain your AVS? Mail a copy    Assessment & Plan     Hypercholesteremia  Stable.  Continue taking medication  - atorvastatin (LIPITOR) 40 MG tablet; Take 1 tablet (40 mg) by mouth daily    Pedal edema  Medication refilled  - hydrochlorothiazide (HYDRODIURIL) 12.5 MG tablet; Take 1 tablet (12.5 mg) by mouth daily    Gastroesophageal reflux disease without esophagitis  Patient to take one tablet BID for 14 days for H Pylori treatment, then once daily.  - omeprazole (PRILOSEC) 40 MG DR capsule; Take one tablet BID for 14 days, then once daily.    Acquired hypothyroidism  Stable.  Continue medication  - levothyroxine (SYNTHROID/LEVOTHROID) 75 MCG tablet; TAKE 1 TABLET(75 MCG) BY MOUTH DAILY    Infection due to clarithromycin resistant Helicobacter pylori  New treatment given  Submit new H Pylori stool test in 8-10 weeks.  - omeprazole (PRILOSEC) 40 MG DR capsule; Take one tablet BID for 14 days, then once daily.  - levofloxacin (LEVAQUIN) 500 MG tablet; Take 1 tablet (500 mg) by mouth daily  - amoxicillin (AMOXIL) 875 MG tablet; Take 1 tablet (875 mg) by mouth 3 times daily for 14 days  - Helicobacter pylori Antigen Stool; Future         See Patient Instructions  Return if symptoms worsen or fail to improve.    Alondra Davila NP  [facility] MARIELLE Rowe is a 56 year old who presents for the following health issues:    HPI     H Pylori:  -Patient was ordered a test for H Pylori during routine physical for GERD symptoms on 9/16/2021  -Was treated for H Pylori 10/14/2021.  Repeat H Pylori stool 12/4/2021 positive.  -Needs repeat treatment for resistant H Pylori    Requesting refills of medications.    Hypothyroidism:  Last check 3/2021 was stable at 2.1  Hypertension: stable at last visit  BP Readings from Last 3  Encounters:   10/14/21 110/84   09/16/21 124/80   08/22/20 114/80   High cholesterol: last check 9/2021 was elevated.  Encouraged compliance.      Review of Systems   Constitutional, HEENT, cardiovascular, pulmonary, gi and gu systems are negative, except as otherwise noted.      Objective       Vitals:  No vitals were obtained today due to virtual visit.    Physical Exam   healthy, alert and no distress  PSYCH: Alert and oriented times 3; coherent speech, normal   rate and volume, able to articulate logical thoughts, able   to abstract reason, no tangential thoughts, no hallucinations   or delusions  Her affect is normal  RESP: No cough, no audible wheezing, able to talk in full sentences  Remainder of exam unable to be completed due to telephone visits      Phone call duration: 12 minutes

## 2021-12-31 ENCOUNTER — HOSPITAL ENCOUNTER (OUTPATIENT)
Dept: PHYSICAL THERAPY | Facility: CLINIC | Age: 56
End: 2021-12-31
Payer: COMMERCIAL

## 2021-12-31 DIAGNOSIS — R26.89 BALANCE PROBLEMS: ICD-10-CM

## 2021-12-31 DIAGNOSIS — M62.81 GENERALIZED MUSCLE WEAKNESS: Primary | ICD-10-CM

## 2021-12-31 DIAGNOSIS — R26.2 DIFFICULTY WALKING: ICD-10-CM

## 2021-12-31 PROCEDURE — 97110 THERAPEUTIC EXERCISES: CPT | Mod: GP | Performed by: PHYSICAL THERAPIST

## 2022-01-10 ENCOUNTER — HOSPITAL ENCOUNTER (OUTPATIENT)
Dept: PHYSICAL THERAPY | Facility: CLINIC | Age: 57
End: 2022-01-10
Payer: COMMERCIAL

## 2022-01-10 DIAGNOSIS — R26.89 BALANCE PROBLEMS: ICD-10-CM

## 2022-01-10 DIAGNOSIS — M62.81 GENERALIZED MUSCLE WEAKNESS: Primary | ICD-10-CM

## 2022-01-10 DIAGNOSIS — R26.2 DIFFICULTY WALKING: ICD-10-CM

## 2022-01-10 PROCEDURE — 97110 THERAPEUTIC EXERCISES: CPT | Mod: GP | Performed by: PHYSICAL THERAPIST

## 2022-01-10 PROCEDURE — 97112 NEUROMUSCULAR REEDUCATION: CPT | Mod: GP | Performed by: PHYSICAL THERAPIST

## 2022-01-17 ENCOUNTER — HOSPITAL ENCOUNTER (OUTPATIENT)
Dept: PHYSICAL THERAPY | Facility: CLINIC | Age: 57
End: 2022-01-17
Payer: COMMERCIAL

## 2022-01-17 DIAGNOSIS — R26.2 DIFFICULTY WALKING: ICD-10-CM

## 2022-01-17 DIAGNOSIS — M62.81 GENERALIZED MUSCLE WEAKNESS: ICD-10-CM

## 2022-01-17 DIAGNOSIS — R26.89 BALANCE PROBLEMS: Primary | ICD-10-CM

## 2022-01-17 PROCEDURE — 97112 NEUROMUSCULAR REEDUCATION: CPT | Mod: GP | Performed by: PHYSICAL THERAPIST

## 2022-01-17 PROCEDURE — 97110 THERAPEUTIC EXERCISES: CPT | Mod: GP | Performed by: PHYSICAL THERAPIST

## 2022-01-24 ENCOUNTER — HOSPITAL ENCOUNTER (OUTPATIENT)
Dept: PHYSICAL THERAPY | Facility: CLINIC | Age: 57
End: 2022-01-24
Payer: COMMERCIAL

## 2022-01-24 DIAGNOSIS — R26.89 BALANCE PROBLEMS: ICD-10-CM

## 2022-01-24 DIAGNOSIS — R26.2 DIFFICULTY WALKING: ICD-10-CM

## 2022-01-24 DIAGNOSIS — M62.81 GENERALIZED MUSCLE WEAKNESS: Primary | ICD-10-CM

## 2022-01-24 PROCEDURE — 97112 NEUROMUSCULAR REEDUCATION: CPT | Mod: GP | Performed by: PHYSICAL THERAPIST

## 2022-01-24 PROCEDURE — 97110 THERAPEUTIC EXERCISES: CPT | Mod: GP | Performed by: PHYSICAL THERAPIST

## 2022-01-31 ENCOUNTER — HOSPITAL ENCOUNTER (OUTPATIENT)
Dept: PHYSICAL THERAPY | Facility: CLINIC | Age: 57
End: 2022-01-31
Payer: COMMERCIAL

## 2022-01-31 DIAGNOSIS — R26.89 BALANCE PROBLEMS: ICD-10-CM

## 2022-01-31 DIAGNOSIS — M62.81 GENERALIZED MUSCLE WEAKNESS: Primary | ICD-10-CM

## 2022-01-31 DIAGNOSIS — R26.2 DIFFICULTY WALKING: ICD-10-CM

## 2022-01-31 PROCEDURE — 97110 THERAPEUTIC EXERCISES: CPT | Mod: GP | Performed by: PHYSICAL THERAPIST

## 2022-02-04 ENCOUNTER — HOSPITAL ENCOUNTER (OUTPATIENT)
Dept: PHYSICAL THERAPY | Facility: CLINIC | Age: 57
End: 2022-02-04
Payer: COMMERCIAL

## 2022-02-04 DIAGNOSIS — R26.2 DIFFICULTY WALKING: ICD-10-CM

## 2022-02-04 DIAGNOSIS — R26.89 BALANCE PROBLEMS: ICD-10-CM

## 2022-02-04 DIAGNOSIS — M62.81 GENERALIZED MUSCLE WEAKNESS: Primary | ICD-10-CM

## 2022-02-04 PROCEDURE — 97110 THERAPEUTIC EXERCISES: CPT | Mod: GP | Performed by: PHYSICAL THERAPIST

## 2022-02-07 ENCOUNTER — HOSPITAL ENCOUNTER (OUTPATIENT)
Dept: PHYSICAL THERAPY | Facility: CLINIC | Age: 57
End: 2022-02-07
Payer: COMMERCIAL

## 2022-02-07 DIAGNOSIS — R26.89 BALANCE PROBLEMS: ICD-10-CM

## 2022-02-07 DIAGNOSIS — R26.2 DIFFICULTY WALKING: ICD-10-CM

## 2022-02-07 DIAGNOSIS — M62.81 GENERALIZED MUSCLE WEAKNESS: Primary | ICD-10-CM

## 2022-02-07 PROCEDURE — 97110 THERAPEUTIC EXERCISES: CPT | Mod: GP | Performed by: PHYSICAL THERAPIST

## 2022-02-07 NOTE — PROGRESS NOTES
[facility]    OUTPATIENT PHYSICAL THERAPY  PLAN OF TREATMENT FOR OUTPATIENT REHABILITATION AND PROGRESS NOTE           Patient's Last Name, First Name, Jae Navas Date of Birth  1965   Provider's Name  [facility] Medical Record No.  [id number]    Onset Date  10/14/21 (date of order) Start of Care Date  11/10/2021   Type:     _X_PT   ___OT   ___SLP Medical Diagnosis  Difficulty walking   PT Diagnosis  Deconditioning, Balance Problems Plan of Treatment  Frequency/Duration: 1x/week for up to 6 additional visits  Certification date from 2/7/2022   to 4/7/2022     Goals:  Goal Identifier HEP   Goal Description Patient with assistance from family members/caregivers will be independent with a home exercise program addressing strength, balance, flexibility, and general conditioning in order to maximize functional independence with ADLs, IADLs, and mobility.   Target Date 04/07/22   Date Met      Progress (detail required for progress note): Pt's family reports good tolerance to current HEP.     Goal Identifier Postural Stability   Goal Description The patient will be able to maintain rhomberg stance x 30 seconds with eyes opened while demonstrating a normal degree of sway to demonstrate improved postural stability.   Target Date 02/07/22   Date Met  02/07/22   Progress (detail required for progress note): eval: attempted, did not tolerate, today: with encouragement was able to maintain position for 30 seconds, demonstrated good stability, wanted to hold daughter-in-law hand due to fear of falling     Goal Identifier 6 minute walk test   Goal Description Patient will be able to complete 6-minute walk test with OMNI score of 4 or less in order to demonstrate improved aerobic conditioning and ability to ambulate in the community.   Target Date 04/07/22   Date  Met      Progress (detail required for progress note): eval: did not assess, limited tolerance for mobility, today: 320 feet in 3 minutes     Goal Identifier sit to/from stand   Goal Description Patient will demonstrate improved functional lower extremity strength by being able to complete sit to and from stand with minimal upper extremity support.   Target Date 04/07/22   Date Met      Progress (detail required for progress note): eval: moderate upper extremity support, today: not consistently min assist of one, however, improving     Beginning/End Dates of Progress Note Reporting Period:  11/10/2021 to 2/7/2022; total of 10 visits    Progress Toward Goals: Patient demonstrating good progression since evaluation; met postural stability goal, decreased upper extremity assistance with sit to/from stand, compliant with current HEP with assistance from family, and working towards longer duration of aerobic activity.  Patient will continue to benefit from skilled physical therapy to maximize independence and safety with ADLs, IADLs, and functional mobility and minimize risk for falls.    Client Self (Subjective) Report for Progress Note Reporting Period: Patient arriving today with her daughter-in-law.  Overall, noting improvements with mobility and tolerance for activities at home.  Reporting consistent performance of HEP.         I CERTIFY THE NEED FOR THESE SERVICES FURNISHED UNDER        THIS PLAN OF TREATMENT AND WHILE UNDER MY CARE     (Physician co-signature of this document indicates review and certification of the therapy plan).                Referring Provider: Rohit Velez Mai, MD Megan Brozovich, PT, DPT

## 2022-02-14 ENCOUNTER — HOSPITAL ENCOUNTER (OUTPATIENT)
Dept: PHYSICAL THERAPY | Facility: CLINIC | Age: 57
End: 2022-02-14
Payer: COMMERCIAL

## 2022-02-14 DIAGNOSIS — R26.2 DIFFICULTY WALKING: ICD-10-CM

## 2022-02-14 DIAGNOSIS — R26.89 BALANCE PROBLEMS: ICD-10-CM

## 2022-02-14 DIAGNOSIS — M62.81 GENERALIZED MUSCLE WEAKNESS: Primary | ICD-10-CM

## 2022-02-14 PROCEDURE — 97112 NEUROMUSCULAR REEDUCATION: CPT | Mod: GP | Performed by: PHYSICAL THERAPIST

## 2022-02-14 PROCEDURE — 97110 THERAPEUTIC EXERCISES: CPT | Mod: GP | Performed by: PHYSICAL THERAPIST

## 2022-02-17 PROBLEM — E11.9 TYPE 2 DIABETES MELLITUS (H): Status: ACTIVE | Noted: 2018-07-24

## 2022-02-17 PROBLEM — E03.9 ACQUIRED HYPOTHYROIDISM: Status: ACTIVE | Noted: 2018-07-16

## 2022-03-07 ENCOUNTER — HOSPITAL ENCOUNTER (OUTPATIENT)
Dept: PHYSICAL THERAPY | Facility: CLINIC | Age: 57
End: 2022-03-07
Payer: COMMERCIAL

## 2022-03-07 DIAGNOSIS — R26.2 DIFFICULTY WALKING: ICD-10-CM

## 2022-03-07 DIAGNOSIS — R26.89 BALANCE PROBLEMS: ICD-10-CM

## 2022-03-07 DIAGNOSIS — M62.81 GENERALIZED MUSCLE WEAKNESS: Primary | ICD-10-CM

## 2022-03-07 PROCEDURE — 97110 THERAPEUTIC EXERCISES: CPT | Mod: GP | Performed by: PHYSICAL THERAPIST

## 2022-03-14 ENCOUNTER — HOSPITAL ENCOUNTER (OUTPATIENT)
Dept: PHYSICAL THERAPY | Facility: CLINIC | Age: 57
Discharge: HOME | End: 2022-03-14
Payer: COMMERCIAL

## 2022-03-14 DIAGNOSIS — M62.81 GENERALIZED MUSCLE WEAKNESS: Primary | ICD-10-CM

## 2022-03-14 DIAGNOSIS — R26.89 BALANCE PROBLEMS: ICD-10-CM

## 2022-03-14 DIAGNOSIS — R26.2 DIFFICULTY WALKING: ICD-10-CM

## 2022-03-14 PROCEDURE — 97110 THERAPEUTIC EXERCISES: CPT | Mod: GP | Performed by: PHYSICAL THERAPIST

## 2022-03-14 NOTE — PROGRESS NOTES
[facility]    Outpatient Physical Therapy Discharge Note  Patient: Jae Ramos  : 1965    Beginning/End Dates of Reporting Period:  2022 to 3/14/2022; total of 13 visits    Referring Provider: Rohit Velez Mai, MD    Therapy Diagnosis: Deconditioning, Balance Problems     Client Self Report: Patient arriving today with step daughter who states that the patient has been more fatigued and has had less energy the past few weeks.  Reports overall improvement with mobility since attending physical therapy - can independently get in/out of vehicle and go up/down stairs without assistance.    Goals:  Goal Identifier MET: HEP   Goal Description Patient with assistance from family members/caregivers will be independent with a home exercise program addressing strength, balance, flexibility, and general conditioning in order to maximize functional independence with ADLs, IADLs, and mobility.   Target Date 22   Date Met  22   Progress (detail required for progress note): Family is able to assist with HEP and reports overall fair to good compliance with HEP - depends on patient's mental status     Goal Identifier MET: Postural Stability   Goal Description The patient will be able to maintain rhomberg stance x 30 seconds with eyes opened while demonstrating a normal degree of sway to demonstrate improved postural stability.   Target Date 22   Date Met  22   Progress (detail required for progress note): eval: attempted, did not tolerate, today: with encouragement was able to maintain position for 30 seconds, demonstrated good stability, wanted to hold daughter-in-law hand due to fear of falling     Goal Identifier NOT MET: 6 minute walk test   Goal Description Patient will be able to complete 6-minute walk test with OMNI score of 4 or less in order to demonstrate improved aerobic  conditioning and ability to ambulate in the community.   Target Date 04/07/22   Date Met      Progress (detail required for progress note): eval: did not assess, limited tolerance for mobility, 2/7/2022: 320 feet in 3 minutes, today: longest duration of continuous walk ~ 3 minutes, able to complete total of 4 and half minues of continuous movement by walking and asecnding/descending 4 stairs with bilateral rails x 5 reps     Goal Identifier NOT MET: sit to/from stand   Goal Description Patient will demonstrate improved functional lower extremity strength by being able to complete sit to and from stand with minimal upper extremity support.   Target Date 04/07/22   Date Met      Progress (detail required for progress note): eval: moderate upper extremity support, today: not consistently min assist of one, however, improving     Plan:  Discharge from therapy.    Discharge:    Reason for Discharge: Patient, with assistance from family, is independent with a HEP at this time and is not making any additional progression at this time; mental health impacting treatment sessions at times.    Discharge Plan: Patient to continue home program.

## 2022-03-22 DIAGNOSIS — E03.9 ACQUIRED HYPOTHYROIDISM: ICD-10-CM

## 2022-03-22 DIAGNOSIS — R60.0 PEDAL EDEMA: ICD-10-CM

## 2022-03-22 DIAGNOSIS — E78.00 HYPERCHOLESTEREMIA: ICD-10-CM

## 2022-03-24 RX ORDER — HYDROCHLOROTHIAZIDE 12.5 MG/1
12.5 TABLET ORAL DAILY
Qty: 90 TABLET | Refills: 0 | Status: SHIPPED | OUTPATIENT
Start: 2022-03-24 | End: 2022-06-08

## 2022-03-24 RX ORDER — ATORVASTATIN CALCIUM 40 MG/1
40 TABLET, FILM COATED ORAL DAILY
Qty: 90 TABLET | Refills: 0 | Status: SHIPPED | OUTPATIENT
Start: 2022-03-24 | End: 2022-06-08

## 2022-03-24 RX ORDER — LEVOTHYROXINE SODIUM 75 UG/1
TABLET ORAL
Qty: 90 TABLET | Refills: 0 | Status: SHIPPED | OUTPATIENT
Start: 2022-03-24 | End: 2022-06-08

## 2022-03-24 NOTE — TELEPHONE ENCOUNTER
Routing refill request to provider for review/approval because:  Labs out of range:  creatinine  Labs not current: TSH    Creatinine   Date Value Ref Range Status   10/06/2021 0.48 (L) 0.52 - 1.04 mg/dL Final   08/26/2020 0.61 0.52 - 1.04 mg/dL Final       Richard PATEL RN

## 2022-03-24 NOTE — TELEPHONE ENCOUNTER
Karely refill provided today - pt needs appt prior to further refills.  Please call to inform and schedule.

## 2022-03-25 NOTE — TELEPHONE ENCOUNTER
Pt called and LVM with direct number to make an apt to receive further refills.    Valerie Mckeon, EMT at 9:26 AM on March 25, 2022  [facility]  [phone]

## 2022-06-08 ENCOUNTER — OFFICE VISIT (OUTPATIENT)
Dept: PEDIATRICS | Facility: CLINIC | Age: 57
End: 2022-06-08
Payer: COMMERCIAL

## 2022-06-08 VITALS
TEMPERATURE: 98.2 F | HEART RATE: 109 BPM | DIASTOLIC BLOOD PRESSURE: 72 MMHG | RESPIRATION RATE: 16 BRPM | OXYGEN SATURATION: 96 % | WEIGHT: 228.8 LBS | BODY MASS INDEX: 34.68 KG/M2 | HEIGHT: 68 IN | SYSTOLIC BLOOD PRESSURE: 106 MMHG

## 2022-06-08 DIAGNOSIS — E03.9 ACQUIRED HYPOTHYROIDISM: ICD-10-CM

## 2022-06-08 DIAGNOSIS — E78.00 HYPERCHOLESTEREMIA: ICD-10-CM

## 2022-06-08 DIAGNOSIS — A04.8 INFECTION DUE TO CLARITHROMYCIN RESISTANT HELICOBACTER PYLORI: ICD-10-CM

## 2022-06-08 DIAGNOSIS — Z00.00 WELL ADULT EXAM: ICD-10-CM

## 2022-06-08 DIAGNOSIS — Z16.29 INFECTION DUE TO CLARITHROMYCIN RESISTANT HELICOBACTER PYLORI: ICD-10-CM

## 2022-06-08 DIAGNOSIS — Z86.19 HISTORY OF HELICOBACTER PYLORI INFECTION: Primary | ICD-10-CM

## 2022-06-08 DIAGNOSIS — R60.0 PEDAL EDEMA: ICD-10-CM

## 2022-06-08 DIAGNOSIS — E11.9 TYPE 2 DIABETES MELLITUS WITHOUT COMPLICATION, WITHOUT LONG-TERM CURRENT USE OF INSULIN (H): ICD-10-CM

## 2022-06-08 DIAGNOSIS — K21.9 GASTROESOPHAGEAL REFLUX DISEASE WITHOUT ESOPHAGITIS: ICD-10-CM

## 2022-06-08 LAB — HBA1C MFR BLD: 6.7 % (ref 0–5.6)

## 2022-06-08 PROCEDURE — 82043 UR ALBUMIN QUANTITATIVE: CPT | Performed by: STUDENT IN AN ORGANIZED HEALTH CARE EDUCATION/TRAINING PROGRAM

## 2022-06-08 PROCEDURE — 99214 OFFICE O/P EST MOD 30 MIN: CPT | Mod: GC | Performed by: STUDENT IN AN ORGANIZED HEALTH CARE EDUCATION/TRAINING PROGRAM

## 2022-06-08 PROCEDURE — 80048 BASIC METABOLIC PNL TOTAL CA: CPT | Performed by: STUDENT IN AN ORGANIZED HEALTH CARE EDUCATION/TRAINING PROGRAM

## 2022-06-08 PROCEDURE — 83036 HEMOGLOBIN GLYCOSYLATED A1C: CPT | Performed by: STUDENT IN AN ORGANIZED HEALTH CARE EDUCATION/TRAINING PROGRAM

## 2022-06-08 PROCEDURE — 84443 ASSAY THYROID STIM HORMONE: CPT | Performed by: STUDENT IN AN ORGANIZED HEALTH CARE EDUCATION/TRAINING PROGRAM

## 2022-06-08 PROCEDURE — 36415 COLL VENOUS BLD VENIPUNCTURE: CPT | Performed by: STUDENT IN AN ORGANIZED HEALTH CARE EDUCATION/TRAINING PROGRAM

## 2022-06-08 RX ORDER — FUROSEMIDE 20 MG
20 TABLET ORAL DAILY
Qty: 90 TABLET | Refills: 3 | Status: SHIPPED | OUTPATIENT
Start: 2022-06-08 | End: 2023-04-11

## 2022-06-08 RX ORDER — ATORVASTATIN CALCIUM 40 MG/1
40 TABLET, FILM COATED ORAL DAILY
Qty: 90 TABLET | Refills: 3 | Status: SHIPPED | OUTPATIENT
Start: 2022-06-08 | End: 2022-10-06

## 2022-06-08 RX ORDER — OMEPRAZOLE 40 MG/1
CAPSULE, DELAYED RELEASE ORAL
Qty: 90 CAPSULE | Refills: 3 | Status: SHIPPED | OUTPATIENT
Start: 2022-06-08 | End: 2023-04-11

## 2022-06-08 RX ORDER — CALCIUM CARBONATE 500(1250)
1 TABLET ORAL 2 TIMES DAILY
Qty: 180 TABLET | Refills: 3 | Status: SHIPPED | OUTPATIENT
Start: 2022-06-08 | End: 2023-12-12

## 2022-06-08 RX ORDER — LEVOTHYROXINE SODIUM 75 UG/1
TABLET ORAL
Qty: 90 TABLET | Refills: 3 | Status: SHIPPED | OUTPATIENT
Start: 2022-06-08 | End: 2023-04-11

## 2022-06-08 NOTE — LETTER
[facility] [place]  [address]  [place] [address]                  [phone]   Dona 15, 2022    Jae Ramos  [address] [place] [address]        Dear Jae,    Your H pylori test is negative.     Your microalbumin is normal.     Your thyroid test is normal.     Your kidney function is stable.     Your A1C is 6.7 - your diabetes is controlled.       Dr Ximena Ge MD      Results for orders placed or performed in visit on 06/08/22   Helicobacter pylori Antigen Stool     Status: Normal   Result Value Ref Range    Helicobacter pylori Antigen Stool Negative Negative   Basic metabolic panel  (Ca, Cl, CO2, Creat, Gluc, K, Na, BUN)     Status: Abnormal   Result Value Ref Range    Sodium 139 133 - 144 mmol/L    Potassium 3.9 3.4 - 5.3 mmol/L    Chloride 97 94 - 109 mmol/L    Carbon Dioxide (CO2) 33 (H) 20 - 32 mmol/L    Anion Gap 9 3 - 14 mmol/L    Urea Nitrogen 5 (L) 7 - 30 mg/dL    Creatinine 0.47 (L) 0.52 - 1.04 mg/dL    Calcium 9.6 8.5 - 10.1 mg/dL    Glucose 171 (H) 70 - 99 mg/dL    GFR Estimate >90 >60 mL/min/1.73m2   TSH with free T4 reflex     Status: Normal   Result Value Ref Range    TSH 0.83 0.40 - 4.00 mU/L   Hemoglobin A1c     Status: Abnormal   Result Value Ref Range    Hemoglobin A1C 6.7 (H) 0.0 - 5.6 %   Albumin Random Urine Quantitative with Creat Ratio     Status: None   Result Value Ref Range    Creatinine Urine mg/dL 143 mg/dL    Albumin Urine mg/L 9 mg/L    Albumin Urine mg/g Cr 6.29 0.00 - 25.00 mg/g Cr

## 2022-06-08 NOTE — PROGRESS NOTES
Assessment & Plan     History of Helicobacter pylori infection  Treated for H Pylori last fall, still positive on test for cure, did not complete test of cure after second course. Will re-order today.  - Helicobacter pylori Antigen Stool; Future    Hypercholesteremia  Refill, no changes to dose. Will check fasting lipid panel in ~1 month (to time with other labs) and adjust dose as needed.  - atorvastatin (LIPITOR) 40 MG tablet; Take 1 tablet (40 mg) by mouth daily  - Lipid panel reflex to direct LDL Fasting; Future    Acquired hypothyroidism  Refill, will check level and adjust dose as needed.  - levothyroxine (SYNTHROID/LEVOTHROID) 75 MCG tablet; TAKE 1 TABLET(75 MCG) BY MOUTH DAILY  - TSH with free T4 reflex    Gastroesophageal reflux disease without esophagitis  - omeprazole (PRILOSEC) 40 MG DR capsule; Take one tablet BID for 14 days, then once daily.    Pedal edema  Long standing lower extremity edema. She has a normal blood pressure, so will switch to Lasix. Cannot see records of edema being worked up previously. Most likely venous insufficiency, but will send for echo.  - Discontinue HCTZ  - Start furosemide (LASIX) 20 MG tablet; Take 1 tablet (20 mg) by mouth daily  - Echocardiogram Complete; Future  - Basic metabolic panel  (Ca, Cl, CO2, Creat, Gluc, K, Na, BUN) today   - Basic metabolic panel  (Ca, Cl, CO2, Creat, Gluc, K, Na, BUN); Future in 1 month after starting lasix    Well adult exam  Step daughter would like calcium supplement.   - calcium carbonate (OS-LEANDRA) 500 MG tablet; Take 1 tablet (500 mg) by mouth 2 times daily    Type 2 diabetes mellitus without complication, without long-term current use of insulin (H)  - Basic metabolic panel  (Ca, Cl, CO2, Creat, Gluc, K, Na, BUN); Future  - Hemoglobin A1c; Future  - Albumin Random Urine Quantitative with Creat Ratio; Future               BMI:   Estimated body mass index is 34.48 kg/m  as calculated from the following:    Height as of this encounter:  "1.735 m (5' 8.31\").    Weight as of this encounter: 103.8 kg (228 lb 12.8 oz).         Return in about 4 weeks (around 7/6/2022) for Lab only, for BMP and fasting lipids, AM appt.     Follow up clinic visit in ~6 months.    Ranjan Vu MD  [facility] MARIELLE Rowe is a 57 year old who presents for the following health issues: Medication refill and lower extremity edema. She is accompanied by her step-daughter. Jae did not communicate at all during the visit, she whispered in Ethiopian during visit. Per daughter in law, she communicates a little at home and follows directions at home. Visit conducted with , though daughter in law speaks English fairly well.    Primarily here for medication refills. Also concerned that lower extremity edema is worse. Jae has been on hydrochlorothiazide for lower extremity edema for several years, lately she is still having swelling and daughter in law wants to know if the dose should be increased.    Diabetes Follow-up      How often are you checking your blood sugar? Not at all    What concerns do you have today about your diabetes? Other: Sugars in urine      Do you have any of these symptoms? (Select all that apply)  Unsure     Have you had a diabetic eye exam in the last 12 months? Not this year but last year         BP Readings from Last 2 Encounters:   10/14/21 110/84   09/16/21 124/80     Hemoglobin A1C POCT (%)   Date Value   08/26/2020 6.6 (H)   10/25/2019 6.1 (H)     Hemoglobin A1C (%)   Date Value   09/16/2021 6.4 (H)     LDL Cholesterol Calculated (mg/dL)   Date Value   09/16/2021 140 (H)   08/26/2020 82   10/25/2019 142 (H)         Hyperlipidemia Follow-Up      Are you regularly taking any medication or supplement to lower your cholesterol?   Yes- Lipitor     Are you having muscle aches or other side effects that you think could be caused by your cholesterol lowering medication?  Not sure       How many servings of fruits " "and vegetables do you eat daily?  4 or more    On average, how many sweetened beverages do you drink each day (Examples: soda, juice, sweet tea, etc.  Do NOT count diet or artificially sweetened beverages)?   1    How many days per week do you exercise enough to make your heart beat faster? 7    How many minutes a day do you exercise enough to make your heart beat faster? 30 - 60    How many days per week do you miss taking your medication? 0    Review medications       Review of Systems   Constitutional, HEENT, cardiovascular, pulmonary, gi and gu systems are negative, except as otherwise noted.      Objective    /72   Pulse 109   Temp 98.2  F (36.8  C)   Resp 16   Ht 1.735 m (5' 8.31\")   Wt 103.8 kg (228 lb 12.8 oz)   SpO2 96%   BMI 34.48 kg/m    Body mass index is 34.48 kg/m .  Physical Exam   GENERAL: healthy, alert  Psych: Did not communicate (schizoaffective disorder), whispered throughout visit  RESP: lungs clear to auscultation - no rales, rhonchi or wheezes  CV: regular rate and rhythm, normal S1 S2, no S3 or S4, no murmur, click or rub, good peripheral pulses, 1+ lower extremity edema to knees, mild/moderate pain with palpation of edema  ABDOMEN: soft, nontender, no hepatosplenomegaly, no masses and bowel sounds normal  MS: no gross musculoskeletal defects noted, no edema        Physician Attestation   I, Ximena Packer MD, saw this patient and agree with the findings and plan of care as documented in the note.      Items personally reviewed/procedural attestation: vitals and labs.    Ximena Packer MD        "

## 2022-06-08 NOTE — PATIENT INSTRUCTIONS
It was great to meet you in clinic today!    For leg swelling:  - Stop taking hydrochlorothiazide  - Start taking Lasix 20mg (1 pill) daily  - Schedule echocardiogram (ultrasound of the heart), try to schedule in the next 1-2 months    We are also checking labs today (electrolytes, diabetes screen, and thyroid test). We will contact you if anything is abnormal.    Come back for your 4th Covid vaccine when you are ready!    We will also check labs again in 1 month. We will schedule a lab only visit for that.

## 2022-06-09 LAB
ANION GAP SERPL CALCULATED.3IONS-SCNC: 9 MMOL/L (ref 3–14)
BUN SERPL-MCNC: 5 MG/DL (ref 7–30)
CALCIUM SERPL-MCNC: 9.6 MG/DL (ref 8.5–10.1)
CHLORIDE BLD-SCNC: 97 MMOL/L (ref 94–109)
CO2 SERPL-SCNC: 33 MMOL/L (ref 20–32)
CREAT SERPL-MCNC: 0.47 MG/DL (ref 0.52–1.04)
CREAT UR-MCNC: 143 MG/DL
GFR SERPL CREATININE-BSD FRML MDRD: >90 ML/MIN/1.73M2
GLUCOSE BLD-MCNC: 171 MG/DL (ref 70–99)
MICROALBUMIN UR-MCNC: 9 MG/L
MICROALBUMIN/CREAT UR: 6.29 MG/G CR (ref 0–25)
POTASSIUM BLD-SCNC: 3.9 MMOL/L (ref 3.4–5.3)
SODIUM SERPL-SCNC: 139 MMOL/L (ref 133–144)
TSH SERPL DL<=0.005 MIU/L-ACNC: 0.83 MU/L (ref 0.4–4)

## 2022-06-13 PROCEDURE — 87338 HPYLORI STOOL AG IA: CPT | Performed by: STUDENT IN AN ORGANIZED HEALTH CARE EDUCATION/TRAINING PROGRAM

## 2022-06-15 LAB — H PYLORI AG STL QL IA: NEGATIVE

## 2022-08-03 ENCOUNTER — LAB (OUTPATIENT)
Dept: LAB | Facility: CLINIC | Age: 57
End: 2022-08-03
Payer: COMMERCIAL

## 2022-08-03 DIAGNOSIS — Z00.00 WELL ADULT EXAM: ICD-10-CM

## 2022-08-03 DIAGNOSIS — E11.9 TYPE 2 DIABETES MELLITUS WITHOUT COMPLICATION, WITHOUT LONG-TERM CURRENT USE OF INSULIN (H): ICD-10-CM

## 2022-08-03 DIAGNOSIS — R60.0 PEDAL EDEMA: ICD-10-CM

## 2022-08-03 LAB
ANION GAP SERPL CALCULATED.3IONS-SCNC: 3 MMOL/L (ref 3–14)
BUN SERPL-MCNC: 8 MG/DL (ref 7–30)
CALCIUM SERPL-MCNC: 9.2 MG/DL (ref 8.5–10.1)
CHLORIDE BLD-SCNC: 100 MMOL/L (ref 94–109)
CHOLEST SERPL-MCNC: 158 MG/DL
CO2 SERPL-SCNC: 35 MMOL/L (ref 20–32)
CREAT SERPL-MCNC: 0.63 MG/DL (ref 0.52–1.04)
FASTING STATUS PATIENT QL REPORTED: YES
GFR SERPL CREATININE-BSD FRML MDRD: >90 ML/MIN/1.73M2
GLUCOSE BLD-MCNC: 119 MG/DL (ref 70–99)
HDLC SERPL-MCNC: 38 MG/DL
LDLC SERPL CALC-MCNC: 60 MG/DL
NONHDLC SERPL-MCNC: 120 MG/DL
POTASSIUM BLD-SCNC: 4.3 MMOL/L (ref 3.4–5.3)
SODIUM SERPL-SCNC: 138 MMOL/L (ref 133–144)
TRIGL SERPL-MCNC: 300 MG/DL

## 2022-08-03 PROCEDURE — 36415 COLL VENOUS BLD VENIPUNCTURE: CPT

## 2022-08-03 PROCEDURE — 80048 BASIC METABOLIC PNL TOTAL CA: CPT

## 2022-08-03 PROCEDURE — 80061 LIPID PANEL: CPT

## 2022-10-06 ENCOUNTER — OFFICE VISIT (OUTPATIENT)
Dept: PEDIATRICS | Facility: CLINIC | Age: 57
End: 2022-10-06
Payer: COMMERCIAL

## 2022-10-06 VITALS
DIASTOLIC BLOOD PRESSURE: 70 MMHG | OXYGEN SATURATION: 100 % | SYSTOLIC BLOOD PRESSURE: 114 MMHG | BODY MASS INDEX: 36.98 KG/M2 | HEIGHT: 66 IN | TEMPERATURE: 98.6 F | WEIGHT: 230.1 LBS | HEART RATE: 112 BPM | RESPIRATION RATE: 20 BRPM

## 2022-10-06 DIAGNOSIS — E11.9 TYPE 2 DIABETES MELLITUS WITHOUT COMPLICATION, WITHOUT LONG-TERM CURRENT USE OF INSULIN (H): ICD-10-CM

## 2022-10-06 DIAGNOSIS — Z12.11 SCREEN FOR COLON CANCER: ICD-10-CM

## 2022-10-06 DIAGNOSIS — E66.01 MORBID OBESITY (H): ICD-10-CM

## 2022-10-06 DIAGNOSIS — Z00.00 ENCOUNTER FOR MEDICARE ANNUAL WELLNESS EXAM: Primary | ICD-10-CM

## 2022-10-06 DIAGNOSIS — E78.00 HYPERCHOLESTEREMIA: ICD-10-CM

## 2022-10-06 DIAGNOSIS — F25.0 SCHIZOAFFECTIVE DISORDER, BIPOLAR TYPE (H): ICD-10-CM

## 2022-10-06 DIAGNOSIS — Z12.31 VISIT FOR SCREENING MAMMOGRAM: ICD-10-CM

## 2022-10-06 DIAGNOSIS — R60.0 PEDAL EDEMA: ICD-10-CM

## 2022-10-06 DIAGNOSIS — Z23 HIGH PRIORITY FOR 2019-NCOV VACCINE: ICD-10-CM

## 2022-10-06 LAB — HBA1C MFR BLD: 6.4 % (ref 0–5.6)

## 2022-10-06 PROCEDURE — 0134A COVID-19,PF,MODERNA BIVALENT: CPT | Performed by: INTERNAL MEDICINE

## 2022-10-06 PROCEDURE — 36415 COLL VENOUS BLD VENIPUNCTURE: CPT | Performed by: INTERNAL MEDICINE

## 2022-10-06 PROCEDURE — 80076 HEPATIC FUNCTION PANEL: CPT | Performed by: INTERNAL MEDICINE

## 2022-10-06 PROCEDURE — 99396 PREV VISIT EST AGE 40-64: CPT | Mod: 25 | Performed by: INTERNAL MEDICINE

## 2022-10-06 PROCEDURE — 91313 COVID-19,PF,MODERNA BIVALENT: CPT | Performed by: INTERNAL MEDICINE

## 2022-10-06 PROCEDURE — 90471 IMMUNIZATION ADMIN: CPT | Performed by: INTERNAL MEDICINE

## 2022-10-06 PROCEDURE — 90682 RIV4 VACC RECOMBINANT DNA IM: CPT | Performed by: INTERNAL MEDICINE

## 2022-10-06 PROCEDURE — 83036 HEMOGLOBIN GLYCOSYLATED A1C: CPT | Performed by: INTERNAL MEDICINE

## 2022-10-06 RX ORDER — ATORVASTATIN CALCIUM 40 MG/1
40 TABLET, FILM COATED ORAL DAILY
Qty: 90 TABLET | Refills: 3 | Status: SHIPPED | OUTPATIENT
Start: 2022-10-06 | End: 2023-10-18

## 2022-10-06 NOTE — PROGRESS NOTES
"  SUBJECTIVE:   Jae Ramos is a 57 year old female who presents for Preventive Visit.    Family refused the Prevnar 20 decided to go with the Covid Moderna Bivalent      Patient has been advised of split billing requirements and indicates understanding: Yes  Are you in the first 12 months of your Medicare Part B coverage?  No    Physical Health:    In general, how would you rate your overall physical health? poor    Outside of work, how many days during the week do you exercise? 6-7 days/week    Outside of work, approximately how many minutes a day do you exercise?30-45 minutes    If you drink alcohol do you typically have >3 drinks per day or >7 drinks per week? No    Do you usually eat at least 4 servings of fruit and vegetables a day, include whole grains & fiber and avoid regularly eating high fat or \"junk\" foods? Yes    Do you have any problems taking medications regularly?  No    Do you have any side effects from medications? none    Needs assistance for the following daily activities: telephone use, transportation, shopping, preparing meals, housework, bathing, laundry, money management and taking medicine    Which of the following safety concerns are present in your home?  none identified     Hearing impairment: No    In the past 6 months, have you been bothered by leaking of urine? yes    Mental Health:    In general, how would you rate your overall mental or emotional health? poor  PHQ-2 Score:      Do you feel safe in your environment? Yes    Have you ever done Advance Care Planning? (For example, a Health Directive, POLST, or a discussion with a medical provider or your loved ones about your wishes): No, advance care planning information given to patient to review.  Patient plans to discuss their wishes with loved ones or provider.      Additional concerns to address?  No    Fall risk:  Fallen 2 or more times in the past year?: No  Any fall with injury in the past year?: No  click delete button to " remove this line now  Cognitive Screening: Not appropriate due to mental handicap    Do you have sleep apnea, excessive snoring or daytime drowsiness?: no            Reviewed and updated as needed this visit by clinical staff   Tobacco  Allergies  Meds   Med Hx  Surg Hx  Fam Hx  Soc Hx        Reviewed and updated as needed this visit by Provider                 Social History     Tobacco Use     Smoking status: Never     Smokeless tobacco: Never   Substance Use Topics     Alcohol use: No                           Current providers sharing in care for this patient include:   Patient Care Team:  Rohit Velez Mai, MD as PCP - General (Internal Medicine)  Ketty Michael APRN CNP as Assigned PCP    The following health maintenance items are reviewed in Epic and correct as of today:  Health Maintenance   Topic Date Due     ZOSTER IMMUNIZATION (1 of 2) Never done     EYE EXAM  10/22/2019     Pneumococcal Vaccine: Pediatrics (0 to 5 Years) and At-Risk Patients (6 to 64 Years) (2 - PCV) 11/02/2019     MAMMO SCREENING  10/30/2020     PHQ-2 (once per calendar year)  01/01/2022     YEARLY PREVENTIVE VISIT  09/16/2022     COLORECTAL CANCER SCREENING  09/28/2022     A1C  04/06/2023     MICROALBUMIN  06/08/2023     TSH W/FREE T4 REFLEX  06/08/2023     BMP  08/03/2023     LIPID  08/03/2023     ALT  10/06/2023     DIABETIC FOOT EXAM  10/06/2023     ANNUAL REVIEW OF HM ORDERS  10/06/2023     DTAP/TDAP/TD IMMUNIZATION (3 - Td or Tdap) 07/28/2027     ADVANCE CARE PLANNING  10/06/2027     HEPATITIS C SCREENING  Completed     HIV SCREENING  Completed     INFLUENZA VACCINE  Completed     COVID-19 Vaccine  Completed     IPV IMMUNIZATION  Aged Out     MENINGITIS IMMUNIZATION  Aged Out     PAP  Discontinued     HEPATITIS B IMMUNIZATION  Discontinued     Lab work is in process      ROS:  All other systems on a 10-point review are negative, unless otherwise noted in HPI'    OBJECTIVE:   /70   Pulse 112   Temp 98.6  F  "(37  C) (Oral)   Resp 20   Ht 1.683 m (5' 6.25\")   Wt 104.4 kg (230 lb 1.6 oz)   SpO2 100%   BMI 36.86 kg/m   Estimated body mass index is 36.86 kg/m  as calculated from the following:    Height as of this encounter: 1.683 m (5' 6.25\").    Weight as of this encounter: 104.4 kg (230 lb 1.6 oz).  EXAM:   GENERAL: healthy, alert and no distress  EYES: Eyes grossly normal to inspection, PERRL and conjunctivae and sclerae normal  HENT: ear canals and TM's normal, nose and mouth without ulcers or lesions  NECK: no adenopathy, no asymmetry, masses, or scars and thyroid normal to palpation  RESP: lungs clear to auscultation - no rales, rhonchi or wheezes  CV: regular rate and rhythm, normal S1 S2, no S3 or S4, no murmur, click or rub, no peripheral edema and peripheral pulses strong  ABDOMEN: soft, nontender, no hepatosplenomegaly, no masses and bowel sounds normal  MS: no gross musculoskeletal defects noted, no edema  SKIN: no suspicious lesions or rashes  NEURO: Normal strength and tone, mentation intact and speech normal  PSYCH: mentation appears normal, affect normal/bright    Diagnostic Test Results:  Labs reviewed in Epic    ASSESSMENT / PLAN:       ICD-10-CM    1. Encounter for Medicare annual wellness exam  Z00.00       2. Type 2 diabetes mellitus without complication, without long-term current use of insulin (H)  E11.9 FOOT EXAM     Hemoglobin A1c     Hemoglobin A1c     CANCELED: Adult Eye  Referral    A1C today (slightly early, but okay). Due for foot exam and eye exam.      3. Schizoaffective disorder, bipolar type (H)  F25.0     stable, continue medications per psychiatry.      4. Hypercholesteremia  E78.00 atorvastatin (LIPITOR) 40 MG tablet     Hepatic panel (Albumin, ALT, AST, Bili, Alk Phos, TP)     Hepatic panel (Albumin, ALT, AST, Bili, Alk Phos, TP)    Stable, due for labs today      5. Morbid obesity (H)  E66.01     Has been stable - continue to work on lifestyle modifications for weight " management and diabetes control      6. Visit for screening mammogram  Z12.31 MA SCREENING DIGITAL BILAT - Future  (s+30)      7. Screen for colon cancer  Z12.11 Fecal colorectal cancer screen FIT - Future (S+30)     Fecal colorectal cancer screen FIT - Future (S+30)      8. Pedal edema  R60.0       9. High priority for 2019-nCoV vaccine  Z23 COVID-19,PF,MODERNA BIVALENT 18+Yrs            Patient Instructions     Please ask psychiatrist if she could be a candidate for a low-dose SNRI for her chronic pain.  If she has any questions, please have her call me at [phone] (ask to be transferred to my care team).    Preventive Health Recommendations  Female Ages 50 - 64    Yearly exam: See your health care provider every year in order to  o Review health changes.   o Discuss preventive care.    o Review your medicines if your doctor has prescribed any.      Get a Pap test every three years (unless you have an abnormal result and your provider advises testing more often).    If you get Pap tests with HPV test, you only need to test every 5 years, unless you have an abnormal result.     You do not need a Pap test if your uterus was removed (hysterectomy) and you have not had cancer.    You should be tested each year for STDs (sexually transmitted diseases) if you're at risk.     Have a mammogram every 1 to 2 years.    Have a colonoscopy at age 50, or have a yearly FIT test (stool test). These exams screen for colon cancer.      Have a cholesterol test every 5 years, or more often if advised.    Have a diabetes test (fasting glucose) every three years. If you are at risk for diabetes, you should have this test more often.     If you are at risk for osteoporosis (brittle bone disease), think about having a bone density scan (DEXA).    Shots: Get a flu shot each year. Get a tetanus shot every 10 years.    Nutrition:     Eat at least 5 servings of fruits and vegetables each day.    Eat whole-grain bread, whole-wheat pasta  "and brown rice instead of white grains and rice.    Get adequate Calcium and Vitamin D.     Lifestyle    Exercise at least 150 minutes a week (30 minutes a day, 5 days a week). This will help you control your weight and prevent disease.    Limit alcohol to one drink per day.    No smoking.     Wear sunscreen to prevent skin cancer.     See your dentist every six months for an exam and cleaning.    See your eye doctor every 1 to 2 years.    Patient Education   Personalized Prevention Plan  You are due for the preventive services outlined below.  Your care team is available to assist you in scheduling these services.  If you have already completed any of these items, please share that information with your care team to update in your medical record.  Health Maintenance Due   Topic Date Due     Diabetic Foot Exam  Never done     Zoster (Shingles) Vaccine (1 of 2) Never done     Eye Exam  10/22/2019     Pneumococcal Vaccine (2 - PCV) 11/02/2019     Mammogram  10/30/2020     PHQ-2 (once per calendar year)  01/01/2022     COVID-19 Vaccine (4 - Booster for Moderna series) 02/10/2022     Flu Vaccine (1) 09/01/2022     ANNUAL REVIEW OF HM ORDERS  09/16/2022     Colorectal Cancer Screening  09/28/2022     Preventive Care Visit  09/16/2022              COUNSELING:  Reviewed preventive health counseling, as reflected in patient instructions    Estimated body mass index is 36.86 kg/m  as calculated from the following:    Height as of this encounter: 1.683 m (5' 6.25\").    Weight as of this encounter: 104.4 kg (230 lb 1.6 oz).    Weight management plan: Discussed healthy diet and exercise guidelines    She reports that she has never smoked. She has never used smokeless tobacco.    Appropriate preventive services were discussed with this patient, including applicable screening as appropriate for cardiovascular disease, diabetes, osteopenia/osteoporosis, and glaucoma.  As appropriate for age/gender, discussed screening for " colorectal cancer, prostate cancer, breast cancer, and cervical cancer. Checklist reviewing preventive services available has been given to the patient.    Reviewed patients plan of care and provided an AVS. The Intermediate Care Plan ( asthma action plan, low back pain action plan, and migraine action plan) for Jae meets the Care Plan requirement. This Care Plan has been established and reviewed with the Patient.    Counseling Resources:  ATP IV Guidelines  Pooled Cohorts Equation Calculator  Breast Cancer Risk Calculator  BRCA-Related Cancer Risk Assessment: FHS-7 Tool  FRAX Risk Assessment  ICSI Preventive Guidelines  Dietary Guidelines for Americans, 2010  USDA's MyPlate  ASA Prophylaxis  Lung CA Screening    Charmaine Velez MD  [facility]

## 2022-10-06 NOTE — LETTER
Dear Jae,     Your lab results are stable.  Your A1C is still well-controlled.  At this time, I do not recommend any changes to your current plan of care.     Please feel free to call with any questions.       Sincerely,     Charmaine Velez MD

## 2022-10-06 NOTE — LETTER
October 10, 2022      Jae Ramos  [address] [place] [address]        Dear ,    We are writing to inform you of your test results.      Dear Jae,     Your lab results are stable.  Your A1C is still well-controlled.  At this time, I do not recommend any changes to your current plan of care.     Please feel free to call with any questions.       Sincerely,       Resulted Orders   Hemoglobin A1c   Result Value Ref Range    Hemoglobin A1C 6.4 (H) 0.0 - 5.6 %      Comment:      Normal <5.7%   Prediabetes 5.7-6.4%    Diabetes 6.5% or higher     Note: Adopted from ADA consensus guidelines.   Hepatic panel (Albumin, ALT, AST, Bili, Alk Phos, TP)   Result Value Ref Range    Bilirubin Total 0.3 0.2 - 1.3 mg/dL    Bilirubin Direct <0.1 0.0 - 0.2 mg/dL    Protein Total 7.2 6.8 - 8.8 g/dL    Albumin 3.3 (L) 3.4 - 5.0 g/dL    Alkaline Phosphatase 94 40 - 150 U/L    AST 36 0 - 45 U/L    ALT 33 0 - 50 U/L       If you have any questions or concerns, please call the clinic at the number listed above.       Sincerely,      Rohit Velez MD

## 2022-10-06 NOTE — PATIENT INSTRUCTIONS
Please ask psychiatrist if she could be a candidate for a low-dose SNRI for her chronic pain.  If she has any questions, please have her call me at [phone] (ask to be transferred to my care team).    Preventive Health Recommendations  Female Ages 50 - 64    Yearly exam: See your health care provider every year in order to  Review health changes.   Discuss preventive care.    Review your medicines if your doctor has prescribed any.    Get a Pap test every three years (unless you have an abnormal result and your provider advises testing more often).  If you get Pap tests with HPV test, you only need to test every 5 years, unless you have an abnormal result.   You do not need a Pap test if your uterus was removed (hysterectomy) and you have not had cancer.  You should be tested each year for STDs (sexually transmitted diseases) if you're at risk.   Have a mammogram every 1 to 2 years.  Have a colonoscopy at age 50, or have a yearly FIT test (stool test). These exams screen for colon cancer.    Have a cholesterol test every 5 years, or more often if advised.  Have a diabetes test (fasting glucose) every three years. If you are at risk for diabetes, you should have this test more often.   If you are at risk for osteoporosis (brittle bone disease), think about having a bone density scan (DEXA).    Shots: Get a flu shot each year. Get a tetanus shot every 10 years.    Nutrition:   Eat at least 5 servings of fruits and vegetables each day.  Eat whole-grain bread, whole-wheat pasta and brown rice instead of white grains and rice.  Get adequate Calcium and Vitamin D.     Lifestyle  Exercise at least 150 minutes a week (30 minutes a day, 5 days a week). This will help you control your weight and prevent disease.  Limit alcohol to one drink per day.  No smoking.   Wear sunscreen to prevent skin cancer.   See your dentist every six months for an exam and cleaning.  See your eye doctor every 1 to 2 years.    Patient  Education   Personalized Prevention Plan  You are due for the preventive services outlined below.  Your care team is available to assist you in scheduling these services.  If you have already completed any of these items, please share that information with your care team to update in your medical record.  Health Maintenance Due   Topic Date Due    Diabetic Foot Exam  Never done    Zoster (Shingles) Vaccine (1 of 2) Never done    Eye Exam  10/22/2019    Pneumococcal Vaccine (2 - PCV) 11/02/2019    Mammogram  10/30/2020    PHQ-2 (once per calendar year)  01/01/2022    COVID-19 Vaccine (4 - Booster for Moderna series) 02/10/2022    Flu Vaccine (1) 09/01/2022    ANNUAL REVIEW OF HM ORDERS  09/16/2022    Colorectal Cancer Screening  09/28/2022    Preventive Care Visit  09/16/2022

## 2022-10-07 LAB
ALBUMIN SERPL-MCNC: 3.3 G/DL (ref 3.4–5)
ALP SERPL-CCNC: 94 U/L (ref 40–150)
ALT SERPL W P-5'-P-CCNC: 33 U/L (ref 0–50)
AST SERPL W P-5'-P-CCNC: 36 U/L (ref 0–45)
BILIRUB DIRECT SERPL-MCNC: <0.1 MG/DL (ref 0–0.2)
BILIRUB SERPL-MCNC: 0.3 MG/DL (ref 0.2–1.3)
PROT SERPL-MCNC: 7.2 G/DL (ref 6.8–8.8)

## 2022-10-10 NOTE — RESULT ENCOUNTER NOTE
Send result letter.    Dear Jae,    Your lab results are stable.  Your A1C is still well-controlled.  At this time, I do not recommend any changes to your current plan of care.    Please feel free to call with any questions.      Sincerely,    Charmaine Velez MD

## 2023-01-01 NOTE — PROGRESS NOTES
"SUBJECTIVE/OBJECTIVE:                           Jae Ramos is a 53 year old female coming in for an initial visit for Medication Therapy Management.  She was referred to me from Dr. Velez. Step-daughter (Mor) and  are present.    Chief Complaint: Medication review, needs to transfer all medications from old pharmacy to [facility], moved to [place] in May.  She feels she is missing several medications.    Allergies/ADRs: None  Tobacco: No tobacco use  Alcohol: none  Caffeine: no caffeine  Activity: not much  PMH: Reviewed in Epic  Patient has mental health disorder. Her stepdaughter knows about her medications and helps to administer them.    Medication Adherence/Access:  Patient takes medications directly from bottles.  Patient takes medications 3 time(s) per day.   Per patient, misses medication 0 times per week.   Medication barriers: none.   The patient fills medications at [facility]: YES.  Will take the medications during different times in the day.     Schizoaffective disorder: Current medications include topiramate 50 mg tablets, 3 tablets HS, quetiapine 450 mg HS (can take additional 50 mg HS PRN), Depakote  mg 2 tablets HS. No side effects reported. Per stepdaughter, patient was previously on Depakote ER 1250 mg HS, but the dose was lowered to 1000 mg HS due to high levels and side effects of \"hyper\". Per psychiatry note from Dr. Coker on 5/9/18, Latuda and trazodone were discontinued.   Lab results from [facility]:  5/9/18 valproic acid level: 37 micrograms/mL  5/9/18 free valproic acid level: 5.6 micrograms/mL  4/23/18 valproic acid level: 99 micrograms/mL  4/23/18 free valproic acid level: 23.8 micrograms/mL    Hypothyroidism: Current medication includes levothyroxine 75 mcg AM and she usually takes with other morning medications. Patient is having the following symptoms: none. Per Dr. Vivar family medicine note on 4/26/18, last TSH in October 2017 was 1.66.    Osteoporosis " Prevention/vitamin D deficiency: Current medications include vitamin D 2000 IU daily, calcium +D 600mg /200 units 2 tablets before eating breakfast. Patient's stepdaughter states that they were told to separate the calcium from meals. Per Dr. Vivar family medicine note on 4/26/18, patient has a history of vitamin D deficiency. No side effects reported.    GERD: Current medications include: omeprazole 40 mg AM. The patient does notice symptoms if they miss a dose. Patient feels that current regimen is effective.    Hyperlipidemia: Current therapy includes atorvastatin 40 mg daily. Per Dr. Vivar family medicine note on 4/26/18, last LDL in October 2017 was 53 and cholesterol was 142.    Pain: Current medication includes ibuprofen 200 mg 1-2 tablets PRN. Patient has not been taking recently.     Hx Diabetes:  Pt currently taking none. Per Dr. Vivar family medicine note on 4/26/18, last A1c in October 2017 was 5.8 and stepdaughter reports that patient no longer tests her blood sugars.    Today's Vitals: /78 (BP Location: Right arm, Patient Position: Sitting, Cuff Size: Adult Regular)  Pulse 92  Wt 224 lb (101.6 kg)  BMI 35.08 kg/m2     BP Readings from Last 3 Encounters:   07/25/18 125/78   07/24/18 98/60   07/10/18 108/64     Lab results from [facility] (4/23/18):  ALT: 9 unit(s)/L  AST: 11 unit(s)/L    ASSESSMENT:                             Current medications were reviewed today.     Medication Adherence: fair, advised use of pill boxes to help med adherence    Schizoaffective disorder bipolar type: Needs Improvement. Patient's Depakote levels in May are slightly below the therapeutic range, but she is not experiencing any side effects and dose was lowered in April due to supra-therapeutic level. Reviewed CBC and liver function labs from [facility] on 4/23/18 and all values are within normal limits. Patient would benefit from no dose change at this time and would benefit from establishing  care with a psychiatrist in the area.    Hypothyroidism: Stable. Last TSH is within normal limits.     Osteoporosis Prevention/vitamin D deficiency: Needs Improvement. Patient would benefit from taking her calcium/vitamin D 1 tablet twice daily with meals for better absorption. Per [employer], Depakote is shown to have association with increased rates of bone loss and incidence of fracture, so continued supplementation with calcium and vitamin D may be necessary.    GERD: Stable.  Current treatment is effective.    Hyperlipidemia: Needs Improvement. Unable to calculate ASCVD risk score without HDL. Patient would benefit from future lipid panel.    Pain: Stable.    Hx Diabetes: Stable.  Meeting A1C goal of <7.0  She would benefit from future eye and foot exams and pneumonia vaccine and possibly hepatitis (unsure of coverage).    PLAN:                            1. Patient to set up medications in pill boxes, AM and PM. Patient to separate the calcium+D and vitamin D and take with food during the day.    2. Vika to ask Dr. Velez about refill on Depakote, sent in refill encounter, would continue current dose and have next psychiatry appointment check [facility]PA labs.  Refill sent for vitamin D.    Future considerations:  Eye, foot exam, vaccinations such as Pneumovax and possibly Hep A and B and Shingrix.    I spent 60 minutes with this patient today. I offer these suggestions for consideration by the PCP. A copy of the visit note was provided to the patient's primary care provider.    Will follow up as needed, please let me know if you would like MTM to have ongoing follow-ups.    The patient was given a summary of these recommendations as an after visit summary.     Danielle Adams, TobiD IV Student    iVka Peterson, PharmD [facility]  Medication Therapy Management Practitioner   #[phone]     difficulty breathing

## 2023-03-22 ENCOUNTER — TELEPHONE (OUTPATIENT)
Dept: PEDIATRICS | Facility: CLINIC | Age: 58
End: 2023-03-22
Payer: COMMERCIAL

## 2023-04-11 ENCOUNTER — VIRTUAL VISIT (OUTPATIENT)
Dept: PEDIATRICS | Facility: CLINIC | Age: 58
End: 2023-04-11
Payer: COMMERCIAL

## 2023-04-11 DIAGNOSIS — K21.9 GASTROESOPHAGEAL REFLUX DISEASE WITHOUT ESOPHAGITIS: ICD-10-CM

## 2023-04-11 DIAGNOSIS — E66.01 MORBID OBESITY (H): ICD-10-CM

## 2023-04-11 DIAGNOSIS — E11.9 TYPE 2 DIABETES MELLITUS WITHOUT COMPLICATION, WITHOUT LONG-TERM CURRENT USE OF INSULIN (H): ICD-10-CM

## 2023-04-11 DIAGNOSIS — E03.9 ACQUIRED HYPOTHYROIDISM: ICD-10-CM

## 2023-04-11 DIAGNOSIS — R60.0 PEDAL EDEMA: ICD-10-CM

## 2023-04-11 DIAGNOSIS — F25.0 SCHIZOAFFECTIVE DISORDER, BIPOLAR TYPE (H): ICD-10-CM

## 2023-04-11 PROCEDURE — 99441 PR PHYSICIAN TELEPHONE EVALUATION 5-10 MIN: CPT | Mod: 93 | Performed by: INTERNAL MEDICINE

## 2023-04-11 RX ORDER — FUROSEMIDE 20 MG
20 TABLET ORAL DAILY
Qty: 90 TABLET | Refills: 1 | Status: SHIPPED | OUTPATIENT
Start: 2023-04-11 | End: 2023-10-18

## 2023-04-11 RX ORDER — OMEPRAZOLE 40 MG/1
CAPSULE, DELAYED RELEASE ORAL
Qty: 90 CAPSULE | Refills: 1 | Status: SHIPPED | OUTPATIENT
Start: 2023-04-11 | End: 2023-10-18

## 2023-04-11 RX ORDER — LEVOTHYROXINE SODIUM 75 UG/1
TABLET ORAL
Qty: 90 TABLET | Refills: 3 | Status: SHIPPED | OUTPATIENT
Start: 2023-04-11 | End: 2023-12-12

## 2023-04-11 NOTE — PROGRESS NOTES
"Jae is a 58 year old who is being evaluated via a billable telephone visit.        Assessment & Plan       ICD-10-CM    1. Type 2 diabetes mellitus without complication, without long-term current use of insulin (H)  E11.9 Comprehensive metabolic panel (BMP + Alb, Alk Phos, ALT, AST, Total. Bili, TP)     Albumin Random Urine Quantitative with Creat Ratio     Hemoglobin A1c    Stable, diet controlled.  Due for A1C      2. Acquired hypothyroidism  E03.9 TSH with free T4 reflex     levothyroxine (SYNTHROID/LEVOTHROID) 75 MCG tablet      3. Gastroesophageal reflux disease without esophagitis  K21.9 omeprazole (PRILOSEC) 40 MG DR capsule      4. Pedal edema  R60.0 Comprehensive metabolic panel (BMP + Alb, Alk Phos, ALT, AST, Total. Bili, TP)     furosemide (LASIX) 20 MG tablet      5. Schizoaffective disorder, bipolar type (H)  F25.0     Continue medications per psychiatry - no changes.  stable.      6. Morbid obesity (H)  E66.01     Discussed lifestyle modifications as it relates to management of comorbid conditions.           BMI:   Estimated body mass index is 36.86 kg/m  as calculated from the following:    Height as of 10/6/22: 1.683 m (5' 6.25\").    Weight as of 10/6/22: 104.4 kg (230 lb 1.6 oz).   Weight management plan: Discussed healthy diet and exercise guidelines    See Patient Instructions    Charmaine Velez MD  [facility] MARIELLE Rowe is a 58 year old, presenting for the following health issues:  No chief complaint on file.         View : No data to display.              HPI     No major concerns.  No side effects with medications.  Here for med check.      Review of Systems   All other systems on a 10-point review are negative, unless otherwise noted in HPI        Objective           Vitals:  No vitals were obtained today due to virtual visit.    Physical Exam     PSYCH: Alert and oriented times 3; coherent speech, normal   rate and volume, able to articulate logical " thoughts, able   to abstract reason, no tangential thoughts, no hallucinations   or delusions  Her affect is normal  RESP: No cough, no audible wheezing, able to talk in full sentences  Remainder of exam unable to be completed due to telephone visits                Phone call duration: 6 minutes

## 2023-04-11 NOTE — Clinical Note
Please call - daughter expecting call today.  Needs lab only (does not need to be fasting) at their earliest convenience.  Then needs physical in 6 months (come fasting to that appt).

## 2023-04-12 ENCOUNTER — LAB (OUTPATIENT)
Dept: LAB | Facility: CLINIC | Age: 58
End: 2023-04-12
Payer: COMMERCIAL

## 2023-04-12 DIAGNOSIS — R60.0 PEDAL EDEMA: ICD-10-CM

## 2023-04-12 DIAGNOSIS — E11.9 TYPE 2 DIABETES MELLITUS WITHOUT COMPLICATION, WITHOUT LONG-TERM CURRENT USE OF INSULIN (H): ICD-10-CM

## 2023-04-12 DIAGNOSIS — E03.9 ACQUIRED HYPOTHYROIDISM: ICD-10-CM

## 2023-04-12 LAB
ALBUMIN SERPL BCG-MCNC: 4 G/DL (ref 3.5–5.2)
ALP SERPL-CCNC: 88 U/L (ref 35–104)
ALT SERPL W P-5'-P-CCNC: 23 U/L (ref 10–35)
ANION GAP SERPL CALCULATED.3IONS-SCNC: 17 MMOL/L (ref 7–15)
AST SERPL W P-5'-P-CCNC: 32 U/L (ref 10–35)
BILIRUB SERPL-MCNC: 0.4 MG/DL
BUN SERPL-MCNC: 8.7 MG/DL (ref 8–23)
CALCIUM SERPL-MCNC: 9.5 MG/DL (ref 8.6–10)
CHLORIDE SERPL-SCNC: 97 MMOL/L (ref 98–107)
CREAT SERPL-MCNC: 0.56 MG/DL (ref 0.51–0.95)
CREAT UR-MCNC: 248 MG/DL
DEPRECATED HCO3 PLAS-SCNC: 28 MMOL/L (ref 22–29)
GFR SERPL CREATININE-BSD FRML MDRD: >90 ML/MIN/1.73M2
GLUCOSE SERPL-MCNC: 160 MG/DL (ref 70–99)
HBA1C MFR BLD: 6.5 % (ref 0–5.6)
MICROALBUMIN UR-MCNC: 42.3 MG/L
MICROALBUMIN/CREAT UR: 17.06 MG/G CR (ref 0–25)
POTASSIUM SERPL-SCNC: 4.1 MMOL/L (ref 3.4–5.3)
PROT SERPL-MCNC: 7.5 G/DL (ref 6.4–8.3)
SODIUM SERPL-SCNC: 142 MMOL/L (ref 136–145)
TSH SERPL DL<=0.005 MIU/L-ACNC: 2.53 UIU/ML (ref 0.3–4.2)

## 2023-04-12 PROCEDURE — 84443 ASSAY THYROID STIM HORMONE: CPT

## 2023-04-12 PROCEDURE — 82043 UR ALBUMIN QUANTITATIVE: CPT

## 2023-04-12 PROCEDURE — 80053 COMPREHEN METABOLIC PANEL: CPT

## 2023-04-12 PROCEDURE — 83036 HEMOGLOBIN GLYCOSYLATED A1C: CPT

## 2023-04-12 PROCEDURE — 82570 ASSAY OF URINE CREATININE: CPT

## 2023-04-12 PROCEDURE — 36415 COLL VENOUS BLD VENIPUNCTURE: CPT

## 2023-04-17 ENCOUNTER — TELEPHONE (OUTPATIENT)
Dept: PEDIATRICS | Facility: CLINIC | Age: 58
End: 2023-04-17
Payer: COMMERCIAL

## 2023-05-16 DIAGNOSIS — E11.9 TYPE 2 DIABETES MELLITUS WITHOUT COMPLICATION, WITHOUT LONG-TERM CURRENT USE OF INSULIN (H): Primary | ICD-10-CM

## 2023-05-18 RX ORDER — CALCIUM CARBONATE 500(1250)
TABLET ORAL
Qty: 60 TABLET | Refills: 1 | Status: SHIPPED | OUTPATIENT
Start: 2023-05-18 | End: 2023-07-13

## 2023-07-11 DIAGNOSIS — E11.9 TYPE 2 DIABETES MELLITUS WITHOUT COMPLICATION, WITHOUT LONG-TERM CURRENT USE OF INSULIN (H): ICD-10-CM

## 2023-07-13 RX ORDER — CALCIUM CARBONATE 500(1250)
TABLET ORAL
Qty: 60 TABLET | Refills: 1 | Status: SHIPPED | OUTPATIENT
Start: 2023-07-13 | End: 2023-09-19

## 2023-09-19 DIAGNOSIS — E11.9 TYPE 2 DIABETES MELLITUS WITHOUT COMPLICATION, WITHOUT LONG-TERM CURRENT USE OF INSULIN (H): ICD-10-CM

## 2023-09-19 RX ORDER — CALCIUM CARBONATE 500(1250)
TABLET ORAL
Qty: 60 TABLET | Refills: 1 | Status: SHIPPED | OUTPATIENT
Start: 2023-09-19 | End: 2023-11-14

## 2023-10-04 NOTE — TELEPHONE ENCOUNTER
Declined an .   Duplicate message. See note from earlier today.     Leslie Singh RN on 10/8/2021 at 8:10 AM       Cheek-To-Nose Interpolation Flap Text: A decision was made to reconstruct the defect utilizing an interpolation axial flap and a staged reconstruction.  A telfa template was made of the defect.  This telfa template was then used to outline the Cheek-To-Nose Interpolation flap.  The donor area for the pedicle flap was then injected with anesthesia.  The flap was excised through the skin and subcutaneous tissue down to the layer of the underlying musculature.  The interpolation flap was carefully excised within this deep plane to maintain its blood supply.  The edges of the donor site were undermined.   The donor site was closed in a primary fashion.  The pedicle was then rotated into position and sutured.  Once the tube was sutured into place, adequate blood supply was confirmed with blanching and refill.  The pedicle was then wrapped with xeroform gauze and dressed appropriately with a telfa and gauze bandage to ensure continued blood supply and protect the attached pedicle. The skin margins were undermined to an appropriate distance in all directions utilizing iris scissors and carried over to close the primary defect.

## 2023-10-18 DIAGNOSIS — R60.0 PEDAL EDEMA: ICD-10-CM

## 2023-10-18 DIAGNOSIS — E78.00 HYPERCHOLESTEREMIA: ICD-10-CM

## 2023-10-18 DIAGNOSIS — K21.9 GASTROESOPHAGEAL REFLUX DISEASE WITHOUT ESOPHAGITIS: ICD-10-CM

## 2023-10-18 RX ORDER — OMEPRAZOLE 40 MG/1
CAPSULE, DELAYED RELEASE ORAL
Qty: 90 CAPSULE | Refills: 0 | Status: SHIPPED | OUTPATIENT
Start: 2023-10-18 | End: 2023-12-12

## 2023-10-18 RX ORDER — ATORVASTATIN CALCIUM 40 MG/1
40 TABLET, FILM COATED ORAL DAILY
Qty: 90 TABLET | Refills: 0 | Status: SHIPPED | OUTPATIENT
Start: 2023-10-18 | End: 2023-12-12

## 2023-10-18 RX ORDER — FUROSEMIDE 20 MG
20 TABLET ORAL DAILY
Qty: 90 TABLET | Refills: 0 | Status: SHIPPED | OUTPATIENT
Start: 2023-10-18 | End: 2023-12-12

## 2023-10-18 NOTE — TELEPHONE ENCOUNTER
Note sent to pharmacy, needs appointment. Prescription approved per [facility] Refill Protocol.  Richard PATEL RN 10/18/2023 at 1:37 PM

## 2023-10-21 ENCOUNTER — TRANSFERRED RECORDS (OUTPATIENT)
Dept: MULTI SPECIALTY CLINIC | Facility: CLINIC | Age: 58
End: 2023-10-21

## 2023-10-21 LAB — RETINOPATHY: NORMAL

## 2023-11-14 DIAGNOSIS — E11.9 TYPE 2 DIABETES MELLITUS WITHOUT COMPLICATION, WITHOUT LONG-TERM CURRENT USE OF INSULIN (H): ICD-10-CM

## 2023-11-14 RX ORDER — CALCIUM CARBONATE 500(1250)
TABLET ORAL
Qty: 60 TABLET | Refills: 2 | Status: SHIPPED | OUTPATIENT
Start: 2023-11-14 | End: 2023-12-12

## 2023-12-12 ENCOUNTER — OFFICE VISIT (OUTPATIENT)
Dept: PEDIATRICS | Facility: CLINIC | Age: 58
End: 2023-12-12
Payer: COMMERCIAL

## 2023-12-12 ENCOUNTER — LAB (OUTPATIENT)
Dept: PEDIATRICS | Facility: CLINIC | Age: 58
End: 2023-12-12

## 2023-12-12 VITALS
WEIGHT: 237.2 LBS | HEIGHT: 67 IN | BODY MASS INDEX: 37.23 KG/M2 | OXYGEN SATURATION: 92 % | RESPIRATION RATE: 16 BRPM | SYSTOLIC BLOOD PRESSURE: 133 MMHG | HEART RATE: 102 BPM | TEMPERATURE: 97.9 F | DIASTOLIC BLOOD PRESSURE: 81 MMHG

## 2023-12-12 DIAGNOSIS — Z12.11 SCREEN FOR COLON CANCER: ICD-10-CM

## 2023-12-12 DIAGNOSIS — K21.9 GASTROESOPHAGEAL REFLUX DISEASE WITHOUT ESOPHAGITIS: ICD-10-CM

## 2023-12-12 DIAGNOSIS — N39.46 MIXED INCONTINENCE: ICD-10-CM

## 2023-12-12 DIAGNOSIS — E11.9 TYPE 2 DIABETES MELLITUS WITHOUT COMPLICATION, WITHOUT LONG-TERM CURRENT USE OF INSULIN (H): ICD-10-CM

## 2023-12-12 DIAGNOSIS — E03.9 ACQUIRED HYPOTHYROIDISM: ICD-10-CM

## 2023-12-12 DIAGNOSIS — Z12.31 VISIT FOR SCREENING MAMMOGRAM: ICD-10-CM

## 2023-12-12 DIAGNOSIS — E78.00 HYPERCHOLESTEREMIA: ICD-10-CM

## 2023-12-12 DIAGNOSIS — F25.0 SCHIZOAFFECTIVE DISORDER, BIPOLAR TYPE (H): ICD-10-CM

## 2023-12-12 DIAGNOSIS — Z13.21 ENCOUNTER FOR VITAMIN DEFICIENCY SCREENING: ICD-10-CM

## 2023-12-12 DIAGNOSIS — R60.0 PEDAL EDEMA: ICD-10-CM

## 2023-12-12 DIAGNOSIS — Z23 NEED FOR PROPHYLACTIC VACCINATION AND INOCULATION AGAINST INFLUENZA: ICD-10-CM

## 2023-12-12 DIAGNOSIS — Z00.00 ROUTINE GENERAL MEDICAL EXAMINATION AT A HEALTH CARE FACILITY: Primary | ICD-10-CM

## 2023-12-12 LAB — HBA1C MFR BLD: 7 % (ref 0–5.6)

## 2023-12-12 PROCEDURE — 90677 PCV20 VACCINE IM: CPT | Performed by: INTERNAL MEDICINE

## 2023-12-12 PROCEDURE — 90686 IIV4 VACC NO PRSV 0.5 ML IM: CPT | Performed by: INTERNAL MEDICINE

## 2023-12-12 PROCEDURE — 91320 SARSCV2 VAC 30MCG TRS-SUC IM: CPT | Performed by: INTERNAL MEDICINE

## 2023-12-12 PROCEDURE — 90472 IMMUNIZATION ADMIN EACH ADD: CPT | Performed by: INTERNAL MEDICINE

## 2023-12-12 PROCEDURE — 83036 HEMOGLOBIN GLYCOSYLATED A1C: CPT | Performed by: INTERNAL MEDICINE

## 2023-12-12 PROCEDURE — 99214 OFFICE O/P EST MOD 30 MIN: CPT | Mod: 25 | Performed by: INTERNAL MEDICINE

## 2023-12-12 PROCEDURE — 82306 VITAMIN D 25 HYDROXY: CPT | Performed by: INTERNAL MEDICINE

## 2023-12-12 PROCEDURE — 80048 BASIC METABOLIC PNL TOTAL CA: CPT | Performed by: INTERNAL MEDICINE

## 2023-12-12 PROCEDURE — 90471 IMMUNIZATION ADMIN: CPT | Performed by: INTERNAL MEDICINE

## 2023-12-12 PROCEDURE — 80061 LIPID PANEL: CPT | Performed by: INTERNAL MEDICINE

## 2023-12-12 PROCEDURE — 99396 PREV VISIT EST AGE 40-64: CPT | Mod: 25 | Performed by: INTERNAL MEDICINE

## 2023-12-12 PROCEDURE — 90480 ADMN SARSCOV2 VAC 1/ONLY CMP: CPT | Performed by: INTERNAL MEDICINE

## 2023-12-12 PROCEDURE — 36415 COLL VENOUS BLD VENIPUNCTURE: CPT | Performed by: INTERNAL MEDICINE

## 2023-12-12 RX ORDER — LEVOTHYROXINE SODIUM 75 UG/1
TABLET ORAL
Qty: 90 TABLET | Refills: 3 | Status: SHIPPED | OUTPATIENT
Start: 2023-12-12 | End: 2024-10-01

## 2023-12-12 RX ORDER — FUROSEMIDE 20 MG
20 TABLET ORAL DAILY
Qty: 90 TABLET | Refills: 1 | Status: SHIPPED | OUTPATIENT
Start: 2023-12-12 | End: 2024-06-07

## 2023-12-12 RX ORDER — HYDROCHLOROTHIAZIDE 12.5 MG/1
TABLET ORAL
COMMUNITY
Start: 2022-05-17 | End: 2023-12-12

## 2023-12-12 RX ORDER — OMEPRAZOLE 40 MG/1
CAPSULE, DELAYED RELEASE ORAL
Qty: 90 CAPSULE | Refills: 3 | Status: SHIPPED | OUTPATIENT
Start: 2023-12-12 | End: 2024-10-01

## 2023-12-12 RX ORDER — ATORVASTATIN CALCIUM 40 MG/1
40 TABLET, FILM COATED ORAL DAILY
Qty: 90 TABLET | Refills: 3 | Status: SHIPPED | OUTPATIENT
Start: 2023-12-12 | End: 2024-10-01

## 2023-12-12 RX ORDER — CALCIUM CARBONATE 500(1250)
1 TABLET ORAL 2 TIMES DAILY
Qty: 180 TABLET | Refills: 3 | Status: SHIPPED | OUTPATIENT
Start: 2023-12-12 | End: 2024-10-01

## 2023-12-12 RX ORDER — ARIPIPRAZOLE 5 MG/1
TABLET ORAL
COMMUNITY
Start: 2022-07-15 | End: 2023-12-12

## 2023-12-12 RX ORDER — MAGNESIUM CARB/ALUMINUM HYDROX 105-160MG
TABLET,CHEWABLE ORAL
Qty: 180 EACH | Refills: 11 | Status: SHIPPED | OUTPATIENT
Start: 2023-12-12

## 2023-12-12 RX ORDER — DIVALPROEX SODIUM 500 MG/1
1000 TABLET, EXTENDED RELEASE ORAL AT BEDTIME
Qty: 60 TABLET | Refills: 2 | Status: CANCELLED | OUTPATIENT
Start: 2023-12-12

## 2023-12-12 ASSESSMENT — PAIN SCALES - GENERAL: PAINLEVEL: NO PAIN (0)

## 2023-12-12 NOTE — PROGRESS NOTES
"   SUBJECTIVE:   Jae is a 58 year old, presenting for the following:  Physical        12/12/2023     1:50 PM   Additional Questions   Roomed by RACIEL Padilla   Accompanied by Step daughterBakari         12/12/2023     1:50 PM   Patient Reported Additional Medications   Patient reports taking the following new medications n/a       CONCERNS: Covid and flu shot today, check kidneys       HPI        Social History     Tobacco Use    Smoking status: Never    Smokeless tobacco: Never   Substance Use Topics    Alcohol use: No             10/22/2018     3:53 PM   Alcohol Use   Prescreen: >3 drinks/day or >7 drinks/week? Not Applicable     Reviewed orders with patient.  Reviewed health maintenance and updated orders accordingly - Yes  Lab work is in process    Breast Cancer Screening:  Any new diagnosis of family breast, ovarian, or bowel cancer?     FHS-7:        No data to display                  Pertinent mammograms are reviewed under the imaging tab.    History of abnormal Pap smear:      Reviewed and updated as needed this visit by clinical staff   Tobacco  Allergies  Meds              Reviewed and updated as needed this visit by Provider                     Review of Systems  All other systems on a 10-point review are negative, unless otherwise noted in HPI       OBJECTIVE:   /81 (BP Location: Right arm, Patient Position: Sitting, Cuff Size: Adult Large)   Pulse 102   Temp 97.9  F (36.6  C) (Oral)   Resp 16   Ht 1.706 m (5' 7.17\")   Wt 107.6 kg (237 lb 3.2 oz)   SpO2 92%   BMI 36.97 kg/m    Physical Exam  GENERAL: healthy, alert and no distress  EYES: Eyes grossly normal to inspection, PERRL and conjunctivae and sclerae normal  HENT: ear canals and TM's normal, nose and mouth without ulcers or lesions  NECK: no adenopathy, no asymmetry, masses, or scars and thyroid normal to palpation  RESP: lungs clear to auscultation - no rales, rhonchi or wheezes  CV: regular rate and rhythm, normal S1 S2, " no S3 or S4, no murmur, click or rub, no peripheral edema and peripheral pulses strong  ABDOMEN: soft, nontender, no hepatosplenomegaly, no masses and bowel sounds normal  MS: no gross musculoskeletal defects noted, no edema  SKIN: no suspicious lesions or rashes  NEURO: Normal strength and tone, mentation intact and speech normal  PSYCH: mentation appears normal, affect normal/bright    Diagnostic Test Results:  Labs reviewed in Epic    ASSESSMENT/PLAN:       ICD-10-CM    1. Routine general medical examination at a health care facility  Z00.00       2. Type 2 diabetes mellitus without complication, without long-term current use of insulin (H)   - stable, continues to be diet controlled E11.9 Adult Eye  Referral     Lipid panel reflex to direct LDL Non-fasting     HEMOGLOBIN A1C      3. Gastroesophageal reflux disease without esophagitis   - Stable, no side effects with medications, refilled meds today   K21.9 omeprazole (PRILOSEC) 40 MG DR capsule      4. Acquired hypothyroidism   - Stable, no side effects with medications, refilled meds today  - thyroid labs completed in April 2023 E03.9 levothyroxine (SYNTHROID/LEVOTHROID) 75 MCG tablet      5. Hypercholesteremia  E78.00 atorvastatin (LIPITOR) 40 MG tablet    - Stable, no side effects with medications, refilled meds today        6. Mixed incontinence   - incontinence supplies signed N39.46 Incontinence Supply Disposable (PROTECTIVE UNDERWEAR XL) MISC      7. Pedal edema  R60.0 furosemide (LASIX) 20 MG tablet     Basic metabolic panel  (Ca, Cl, CO2, Creat, Gluc, K, Na, BUN)      8. Schizoaffective disorder, bipolar type (H)   - followed by psychiatry by timi  - on topamax, depakote, seroquel, prazosin, trazodone (previously on abilify, but stopped) F25.0       9. Visit for screening mammogram  Z12.31 MA SCREENING DIGITAL BILAT - Future  (s+30)      10. Screen for colon cancer  Z12.11 COLOGUARD(EXACT SCIENCES)      11. Need for prophylactic vaccination  and inoculation against influenza  Z23                 COUNSELING:  Reviewed preventive health counseling, as reflected in patient instructions        She reports that she has never smoked. She has never used smokeless tobacco.          Charmaine Veelz MD  [facility]

## 2023-12-13 LAB
ANION GAP SERPL CALCULATED.3IONS-SCNC: 13 MMOL/L (ref 7–15)
BUN SERPL-MCNC: 5.4 MG/DL (ref 8–23)
CALCIUM SERPL-MCNC: 9.4 MG/DL (ref 8.6–10)
CHLORIDE SERPL-SCNC: 93 MMOL/L (ref 98–107)
CHOLEST SERPL-MCNC: 139 MG/DL
CREAT SERPL-MCNC: 0.6 MG/DL (ref 0.51–0.95)
DEPRECATED HCO3 PLAS-SCNC: 35 MMOL/L (ref 22–29)
EGFRCR SERPLBLD CKD-EPI 2021: >90 ML/MIN/1.73M2
FASTING STATUS PATIENT QL REPORTED: NO
GLUCOSE SERPL-MCNC: 150 MG/DL (ref 70–99)
HDLC SERPL-MCNC: 36 MG/DL
LDLC SERPL CALC-MCNC: 49 MG/DL
NONHDLC SERPL-MCNC: 103 MG/DL
POTASSIUM SERPL-SCNC: 4 MMOL/L (ref 3.4–5.3)
SODIUM SERPL-SCNC: 141 MMOL/L (ref 135–145)
TRIGL SERPL-MCNC: 268 MG/DL
VIT D+METAB SERPL-MCNC: 26 NG/ML (ref 20–50)

## 2023-12-30 ENCOUNTER — APPOINTMENT (OUTPATIENT)
Dept: LAB | Facility: CLINIC | Age: 58
End: 2023-12-30
Payer: COMMERCIAL

## 2024-01-03 LAB — NONINV COLON CA DNA+OCC BLD SCRN STL QL: NORMAL

## 2024-01-04 ENCOUNTER — LAB (OUTPATIENT)
Dept: PEDIATRICS | Facility: CLINIC | Age: 59
End: 2024-01-04
Payer: COMMERCIAL

## 2024-01-04 ENCOUNTER — APPOINTMENT (OUTPATIENT)
Dept: INTERPRETER SERVICES | Facility: CLINIC | Age: 59
End: 2024-01-04
Payer: COMMERCIAL

## 2024-01-04 DIAGNOSIS — Z12.11 SCREEN FOR COLON CANCER: ICD-10-CM

## 2024-01-31 ENCOUNTER — ANCILLARY PROCEDURE (OUTPATIENT)
Dept: MAMMOGRAPHY | Facility: CLINIC | Age: 59
End: 2024-01-31
Attending: INTERNAL MEDICINE
Payer: COMMERCIAL

## 2024-01-31 DIAGNOSIS — Z12.31 VISIT FOR SCREENING MAMMOGRAM: ICD-10-CM

## 2024-01-31 PROCEDURE — 77067 SCR MAMMO BI INCL CAD: CPT | Mod: TC | Performed by: RADIOLOGY

## 2024-02-23 LAB — NONINV COLON CA DNA+OCC BLD SCRN STL QL: NORMAL

## 2024-02-26 ENCOUNTER — DOCUMENTATION ONLY (OUTPATIENT)
Dept: LAB | Facility: CLINIC | Age: 59
End: 2024-02-26
Payer: COMMERCIAL

## 2024-02-26 ENCOUNTER — ORDERS ONLY (AUTO-RELEASED) (OUTPATIENT)
Dept: PEDIATRICS | Facility: CLINIC | Age: 59
End: 2024-02-26
Payer: COMMERCIAL

## 2024-02-26 DIAGNOSIS — Z12.11 SCREEN FOR COLON CANCER: Primary | ICD-10-CM

## 2024-02-26 DIAGNOSIS — Z12.11 SCREEN FOR COLON CANCER: ICD-10-CM

## 2024-02-26 NOTE — PROGRESS NOTES
Jae Ramos has an upcoming lab appointment:    Future Appointments   Date Time Provider Department [place]   3/18/2024  7:30 AM [facility]    4/16/2024  3:30 [facility]     Patient is scheduled for the following lab(s):   Scheduling Notes: A1c   Made On: 12/14/2023 8:01 AM By: NADINE ORTEZ       There is no order available. Please review and place either future orders or HMPO (Review of Health Maintenance Protocol Orders), as appropriate.    There are no preventive care reminders to display for this patient.  Vick Rueda

## 2024-02-28 ENCOUNTER — TELEPHONE (OUTPATIENT)
Dept: PEDIATRICS | Facility: CLINIC | Age: 59
End: 2024-02-28
Payer: COMMERCIAL

## 2024-02-28 ENCOUNTER — APPOINTMENT (OUTPATIENT)
Dept: INTERPRETER SERVICES | Facility: CLINIC | Age: 59
End: 2024-02-28
Payer: COMMERCIAL

## 2024-02-28 NOTE — TELEPHONE ENCOUNTER
----- Message from Karely Woody RN sent at 2/26/2024  1:00 PM CST -----    ----- Message -----  From: Rohit Velez Mai, MD  Sent: 2/26/2024  12:52 PM CST  To: Mo Crawfodr3/5 Pal C (Rn)    Please call - it looks like the last 2 attempts have been successful (only an empty kit was sent in this last time). I am placing another order, but please call to see if we can assist or help in any way to get an appropriate sample sent in.  Thank you.

## 2024-02-28 NOTE — TELEPHONE ENCOUNTER
Patient calling back. .  Relayed provider message.  Patient states that it is very complicated and she doesn't know how to use packing and send it back. She used to use a [place] one and liked that one better.     She states this is too complicated for her unless someone is there with her every step of the way. Including mailing/opening package, etc.     Is there another way to collect? Through [employer] supply that she could ? A different test for her?     Radha LAMBERT RN on 2/28/2024 at 12:47 PM           Patient OK with detailed message  on VM:   Confirmed phone number:  [phone]

## 2024-02-28 NOTE — TELEPHONE ENCOUNTER
RN attempted to reach patient via . LVMTCB and speak with nurse.       Radha LAMBERT RN on 2/28/2024 at 8:54 AM

## 2024-03-01 NOTE — TELEPHONE ENCOUNTER
This was the most convenient manner, as she did not want to do a colonoscopy and FIT testing requires the same type of stool sampling.    Charmaine Velez MD

## 2024-03-06 NOTE — TELEPHONE ENCOUNTER
RN attempted to speak with patient via . LVMTCB and speak with nurse.     Radha LAMBERT RN on 3/6/2024 at 11:55 AM

## 2024-03-07 NOTE — TELEPHONE ENCOUNTER
Lab calling to state they explained the steps to be collected for colo guard.     Radha LAMBERT RN on 3/7/2024 at 1:36 PM

## 2024-03-07 NOTE — TELEPHONE ENCOUNTER
Call placed to pt with a [place]   Pt wants to come in so someone can help her with the test    Spoke with lab- they stated pt can come in anytime today, someone working in the lab today speaks [place]i.    Pt verbalized understanding and agrees to the plan    Malik Harp RN on 3/7/2024 at 11:54 AM

## 2024-03-18 ENCOUNTER — LAB (OUTPATIENT)
Dept: LAB | Facility: CLINIC | Age: 59
End: 2024-03-18
Payer: COMMERCIAL

## 2024-03-18 DIAGNOSIS — E11.9 TYPE 2 DIABETES MELLITUS (H): Primary | ICD-10-CM

## 2024-03-18 LAB — HBA1C MFR BLD: 7 % (ref 0–5.6)

## 2024-03-18 PROCEDURE — 36415 COLL VENOUS BLD VENIPUNCTURE: CPT

## 2024-03-18 PROCEDURE — 83036 HEMOGLOBIN GLYCOSYLATED A1C: CPT

## 2024-03-19 ENCOUNTER — TELEPHONE (OUTPATIENT)
Dept: PEDIATRICS | Facility: CLINIC | Age: 59
End: 2024-03-19
Payer: COMMERCIAL

## 2024-03-19 DIAGNOSIS — Z00.00 ROUTINE GENERAL MEDICAL EXAMINATION AT A HEALTH CARE FACILITY: Primary | ICD-10-CM

## 2024-03-19 DIAGNOSIS — E11.9 TYPE 2 DIABETES MELLITUS WITHOUT COMPLICATION, WITHOUT LONG-TERM CURRENT USE OF INSULIN (H): ICD-10-CM

## 2024-03-19 NOTE — TELEPHONE ENCOUNTER
"Attempt x 1.     Unable to reach an  at time of call to relay results message below.    \"----- Message -----  From: Charmaine Velez Mai, MD  Sent: 3/19/2024  12:38 PM CDT  To: Mo Sb3/5 Pal C (Rn)     Please call with  - A1C is unchanged.  It is reasonable to continue to work on diet and lifestyle, but I also think starting a medication may have long-term benefits to her overall health as well.       If she is open to doing this, I would recommend either low dose metformin (this is considered first line medication) or jardiance.  The latter would require her to get her labs checked within a few weeks of initiation, however has the benefit of decreased risk of heart disease, stroke, and kidney disease compared to metformin.     Regardless, she should follow up with me in 3 months for an A1C recheck.  Let me know what she decides.     Charmaine Velez MD\"    Routed to CR triage.     BENITA Carlin (Patient Advocate Liaison)  [facility]        "

## 2024-03-19 NOTE — TELEPHONE ENCOUNTER
----- Message from Karely Woody RN sent at 3/19/2024 12:46 PM CDT -----    ----- Message -----  From: Charmaine Velez Mai, MD  Sent: 3/19/2024  12:38 PM CDT  To: Mo Crawford3/5 Pal C (Rn)    Please call with  - A1C is unchanged.  It is reasonable to continue to work on diet and lifestyle, but I also think starting a medication may have long-term benefits to her overall health as well.      If she is open to doing this, I would recommend either low dose metformin (this is considered first line medication) or jardiance.  The latter would require her to get her labs checked within a few weeks of initiation, however has the benefit of decreased risk of heart disease, stroke, and kidney disease compared to metformin.    Regardless, she should follow up with me in 3 months for an A1C recheck.  Let me know what she decides.    Charmaine Velez MD

## 2024-03-20 NOTE — TELEPHONE ENCOUNTER
Call placed to pt's son with an   LM to return call to the clinic    Thank you  Malik Harp RN on 3/20/2024 at 12:54 PM

## 2024-03-21 NOTE — TELEPHONE ENCOUNTER
Call placed to pt's step daughter Tarsha   Relayed message from provider  Pt is okay starting metformin- preferred pharmacy is [facility]    Dr. Velez,    Pt has an appt 4/16/24 with you.  Did you want it then or in 3 months?    Thank you  Malik Harp RN on 3/21/2024 at 12:33 PM

## 2024-03-22 RX ORDER — METFORMIN HCL 500 MG
500 TABLET, EXTENDED RELEASE 24 HR ORAL
Qty: 90 TABLET | Refills: 0 | Status: SHIPPED | OUTPATIENT
Start: 2024-03-22 | End: 2024-03-25

## 2024-03-22 NOTE — TELEPHONE ENCOUNTER
I sent a script to the [facility] - please let me know if they want it sent somewhere else.  It's just a 90 day supply so I didn't do [place].    I recommend she reschedule her appt for 3 months (so early June) from when she starts metformin to monitor her response.    Charmaine Velez MD

## 2024-03-22 NOTE — TELEPHONE ENCOUNTER
"Called pt's step-daughter, Tarsha. No answer. LVMTCB.    MA/TC: Please attempt contact again later.      - Lexa \"Zeferino\" Rashad (he/him/his), RN - Patient Advocate Liason (PAL)  [facility]    "

## 2024-03-23 LAB — NONINV COLON CA DNA+OCC BLD SCRN STL QL: POSITIVE

## 2024-03-25 ENCOUNTER — APPOINTMENT (OUTPATIENT)
Dept: INTERPRETER SERVICES | Facility: CLINIC | Age: 59
End: 2024-03-25
Payer: COMMERCIAL

## 2024-03-25 RX ORDER — METFORMIN HCL 500 MG
500 TABLET, EXTENDED RELEASE 24 HR ORAL
Qty: 90 TABLET | Refills: 0 | Status: SHIPPED | OUTPATIENT
Start: 2024-03-25 | End: 2024-06-07

## 2024-03-25 NOTE — RESULT ENCOUNTER NOTE
Please call pt and/or caregiver/daughter.  Cologsophy came back positive, which means that we cannot rule out colon cancer, therefore the recommended next step is to get a colonoscopy.  This is absolutely something we can discuss further at her next visit.

## 2024-03-25 NOTE — TELEPHONE ENCOUNTER
Jeri Dye dtr calls back.  Advised of below.  She wants metformin sent to [facility].     Appt was scheduled in first available with Dr. Velez - 6/25/24.    She wanted to keep the appt on 4/16/24 for now.        Appointments in Next Year      Apr 16, 2024  3:30 PM  (Arrive by 3:10 PM)  Provider Visit with Rohit Velez MD  [facility] ([facility] ) [phone]     Jun 25, 2024  5:00 PM  (Arrive by 4:40 PM)  [facility] [phone]

## 2024-04-16 ENCOUNTER — OFFICE VISIT (OUTPATIENT)
Dept: PEDIATRICS | Facility: CLINIC | Age: 59
End: 2024-04-16
Attending: INTERNAL MEDICINE
Payer: COMMERCIAL

## 2024-04-16 ENCOUNTER — TRANSFERRED RECORDS (OUTPATIENT)
Dept: MULTI SPECIALTY CLINIC | Facility: CLINIC | Age: 59
End: 2024-04-16

## 2024-04-16 ENCOUNTER — TELEPHONE (OUTPATIENT)
Dept: PEDIATRICS | Facility: CLINIC | Age: 59
End: 2024-04-16

## 2024-04-16 ENCOUNTER — TELEPHONE (OUTPATIENT)
Dept: GASTROENTEROLOGY | Facility: CLINIC | Age: 59
End: 2024-04-16

## 2024-04-16 ENCOUNTER — HOSPITAL ENCOUNTER (OUTPATIENT)
Facility: CLINIC | Age: 59
End: 2024-04-16
Attending: COLON & RECTAL SURGERY | Admitting: COLON & RECTAL SURGERY
Payer: COMMERCIAL

## 2024-04-16 VITALS
OXYGEN SATURATION: 92 % | WEIGHT: 237.3 LBS | TEMPERATURE: 98.8 F | DIASTOLIC BLOOD PRESSURE: 81 MMHG | HEART RATE: 91 BPM | SYSTOLIC BLOOD PRESSURE: 121 MMHG | HEIGHT: 67 IN | RESPIRATION RATE: 20 BRPM | BODY MASS INDEX: 37.25 KG/M2

## 2024-04-16 DIAGNOSIS — E11.9 TYPE 2 DIABETES MELLITUS WITHOUT COMPLICATION, WITHOUT LONG-TERM CURRENT USE OF INSULIN (H): Primary | ICD-10-CM

## 2024-04-16 DIAGNOSIS — E78.00 HYPERCHOLESTEREMIA: ICD-10-CM

## 2024-04-16 DIAGNOSIS — Z12.11 SCREEN FOR COLON CANCER: ICD-10-CM

## 2024-04-16 DIAGNOSIS — Z12.11 SPECIAL SCREENING FOR MALIGNANT NEOPLASMS, COLON: Primary | ICD-10-CM

## 2024-04-16 DIAGNOSIS — E03.9 ACQUIRED HYPOTHYROIDISM: ICD-10-CM

## 2024-04-16 LAB — RETINOPATHY: NORMAL

## 2024-04-16 PROCEDURE — 99214 OFFICE O/P EST MOD 30 MIN: CPT | Mod: 25 | Performed by: INTERNAL MEDICINE

## 2024-04-16 PROCEDURE — 90471 IMMUNIZATION ADMIN: CPT | Performed by: INTERNAL MEDICINE

## 2024-04-16 PROCEDURE — 90750 HZV VACC RECOMBINANT IM: CPT | Performed by: INTERNAL MEDICINE

## 2024-04-16 PROCEDURE — 90472 IMMUNIZATION ADMIN EACH ADD: CPT | Performed by: INTERNAL MEDICINE

## 2024-04-16 PROCEDURE — 90678 RSV VACC PREF BIVALENT IM: CPT | Performed by: INTERNAL MEDICINE

## 2024-04-16 NOTE — PATIENT INSTRUCTIONS
I prefer tylenol first before ibuprofen.    Take 650-1000 mg 2-3 times per day.  If she still is in pain, I recommend to taking 400 mg of ibuprofen.    I have placed an order for colonoscopy.    Start jardiance 10 mg daily- please start this starting in mid-may, so that we can monitor her kidney function 1 month after starting.  Once she starts this, please ensure adequate hydration.

## 2024-04-16 NOTE — PROGRESS NOTES
"  Assessment & Plan       ICD-10-CM    1. Type 2 diabetes mellitus without complication, without long-term current use of insulin (H)  E11.9 Adult Eye  Referral     Albumin Random Urine Quantitative with Creat Ratio     Comprehensive metabolic panel (BMP + Alb, Alk Phos, ALT, AST, Total. Bili, TP)     Hemoglobin A1c     empagliflozin (JARDIANCE) 10 MG TABS tablet      2. Hypercholesteremia  E78.00 Comprehensive metabolic panel (BMP + Alb, Alk Phos, ALT, AST, Total. Bili, TP)      3. Screen for colon cancer  Z12.11 Colonoscopy Screening  Referral      4. Acquired hypothyroidism  E03.9 TSH WITH FREE T4 REFLEX        F/up in June (labs prior) - will complete foot exam at that time.      BMI  Estimated body mass index is 37.03 kg/m  as calculated from the following:    Height as of this encounter: 1.705 m (5' 7.13\").    Weight as of this encounter: 107.6 kg (237 lb 4.8 oz).         Patient Instructions   I prefer tylenol first before ibuprofen.    Take 650-1000 mg 2-3 times per day.  If she still is in pain, I recommend to taking 400 mg of ibuprofen.    I have placed an order for colonoscopy.    Start jardiance 10 mg daily- please start this starting in mid-may, so that we can monitor her kidney function 1 month after starting.  Once she starts this, please ensure adequate hydration.    Sofya Rowe is a 59 year old, presenting for the following health issues:  Follow Up and medication check        4/16/2024     3:15 PM   Additional Questions   Roomed by    Accompanied by step daughter         4/16/2024     3:15 PM   Patient Reported Additional Medications   Patient reports taking the following new medications NO     Via the Health Maintenance questionnaire, the patient has reported the following services have been completed -Eye Exam, this information has been sent to the abstraction team.  History of Present Illness       Reason for visit:  Medication check and follow up    She eats 2-3 " "servings of fruits and vegetables daily.She consumes 1 sweetened beverage(s) daily.She exercises with enough effort to increase her heart rate 30 to 60 minutes per day.  She exercises with enough effort to increase her heart rate 7 days per week.   She is taking medications regularly.           All other systems on a 10-point review are negative, unless otherwise noted in HPI        Objective    /81 (BP Location: Right arm, Patient Position: Sitting, Cuff Size: Adult Regular)   Pulse 91   Temp 98.8  F (37.1  C) (Tympanic)   Resp 20   Ht 1.705 m (5' 7.13\")   Wt 107.6 kg (237 lb 4.8 oz)   SpO2 92%   BMI 37.03 kg/m    Body mass index is 37.03 kg/m .  Physical Exam   GENERAL: healthy, alert and no distress  EYES: Eyes grossly normal to inspection  NECK: no asymmetry, masses, or scars  MS: no gross musculoskeletal defects noted, no edema  SKIN: no suspicious lesions or rashes  NEURO: mentation intact and speech normal  PSYCH: mentation appears normal and affect normal/bright              Signed Electronically by: Charmaine Velez MD    "

## 2024-04-16 NOTE — TELEPHONE ENCOUNTER
"Endoscopy Scheduling Screen      Information given by BENITA Cali at [facility]- [phone]    Have you had a positive Covid test in the last 14 days?  No    What is your communication preference for Instructions and/or Bowel Prep?   Mail/USPS    What insurance is in the chart?  Other:  [facility]    Ordering/Referring Provider: Rohit Velez Mai, MD   (If ordering provider performs procedure, schedule with ordering provider unless otherwise instructed. )    BMI: Estimated body mass index is 37.03 kg/m  as calculated from the following:    Height as of an earlier encounter on 4/16/24: 1.705 m (5' 7.13\").    Weight as of an earlier encounter on 4/16/24: 107.6 kg (237 lb 4.8 oz).     Sedation Ordered  moderate sedation.   If patient BMI > 50 do not schedule in ASC.    If patient BMI > 45 do not schedule at [facility].    Are you taking methadone or Suboxone?  No    Have you had difficulties, pain, or discomfort during past endoscopy procedures?  Pt was not available to answer this question    Are you taking any prescription medications for pain 3 or more times per week?   NO, No RN review required.    Do you have a history of malignant hyperthermia?  No    (Females) Are you currently pregnant?   No     Have you been diagnosed or told you have pulmonary hypertension?   No    Do you have an LVAD?  No    Have you been told you have moderate to severe sleep apnea?  No    Have you been told you have COPD, asthma, or any other lung disease?  No    Do you have any heart conditions?  No     Have you ever had or are you waiting for an organ transplant?  No. Continue scheduling, no site restrictions.    Have you had a stroke or transient ischemic attack (TIA aka \"mini stroke\" in the last 6 months?   No    Have you been diagnosed with or been told you have cirrhosis of the liver?   No    Are you currently on dialysis?   No    Do you need assistance transferring?   No    BMI: Estimated body mass index is 37.03 kg/m  as calculated from " "the following:    Height as of an earlier encounter on 4/16/24: 1.705 m (5' 7.13\").    Weight as of an earlier encounter on 4/16/24: 107.6 kg (237 lb 4.8 oz).     Is patients BMI > 40 and scheduling location [facility]?  Yes (If MAC sedation is ordered, schedule PAC eval)    Do you take an injectable medication for weight loss or diabetes (excluding insulin)?  No    Do you take the medication Naltrexone?  No    Do you take blood thinners?  No       Prep   Are you currently on dialysis or do you have chronic kidney disease?  No    Do you have a diagnosis of diabetes?  Yes (Golytely Prep)    Do you have a diagnosis of cystic fibrosis (CF)?  No    On a regular basis do you go 3 -5 days between bowel movements?  No    BMI > 40?  No    Preferred Pharmacy:    [facility] [place] [facility] - [address]   [address]   [address]  [place] [address]  Phone: [phone] Fax: [phone]    WRITTEN PRESCRIPTION REQUESTED  No address on file      [id number] - [place] - [address]  [address]  [address] [place] [address]  Phone: [phone] Fax: [phone]    [facility] - [place], [place] - [address]  [address]  [address]  [facility] [address]  Phone: [phone] Fax: [phone]      Final Scheduling Details     Procedure scheduled  Colonoscopy    Surgeon:  Gurjit     Date of procedure:  07/02/2024     Pre-OP / PAC:   No - Not required for this site.    Location  [facility] - Per order.    Sedation   Moderate Sedation - Per order.      Patient Reminders:   You will receive a call from a Nurse to review instructions and health history.  This assessment must be completed prior to your procedure.  Failure to complete the Nurse assessment may result in the procedure being cancelled.      On the day of your procedure, please designate an adult(s) who can drive you home stay with you for the next " 24 hours. The medicines used in the exam will make you sleepy. You will not be able to drive.      You cannot take public transportation, ride share services, or non-medical taxi service without a responsible caregiver.  Medical transport services are allowed with the requirement that a responsible caregiver will receive you at your destination.  We require that drivers and caregivers are confirmed prior to your procedure.

## 2024-04-16 NOTE — TELEPHONE ENCOUNTER
Rohit Velez Mai, MD P Ea Sb3/5 Chapo LAMBERT (Rn)  Pt has schizophrenia - daugher cares for her, but requires .  I have ordered colonoscopy, but they will need assistance scheduling this and getting clear instructions on prep - is there a way we can assist her?  Please huddle with me about this for further details.    Called [facility] at [phone] and spoke with Hussain    - Assisted in scheduling the patient for a colonoscopy on 7/2/2024 at 12 PM at the [facility]   - Hussain states that an informational packet will be mailed to the patient's home address on file   - Hussain states that the patient will also receive a phone call from an RN 2 weeks prior to the colonoscopy to go over the details and discuss the prep (with the use of a Haitian )     Called the patient's daughter, Tarsha, at [phone] with the use of  services (Consent to Communicate on file) and left a message with the use of  services   - Provided PAL RN direct line to call back   - Awaiting a call back at this time     Karely BULLOCK RN   [facility]

## 2024-04-16 NOTE — LETTER
4/16/2024      Jae Ramos  [address] [place] [address]      STANDARD Golytely (Colyte, Nulytely)  Prep Instructions for your Colonoscopy  Pre-Assessment Phone Number: [facility]; [phone] option 4    Bowel prep was sent 6/4/2024 to    [facility] [place], [place] - [address]       Please read these instructions carefully at least 7 days prior to your colonoscopy procedure. Be sure to follow all directions completely. The inside of your colon must be clean to allow for a complete examination for the presence of any growths, polyps, and/or abnormalities, as well as their biopsy or removal. A number of tips are included in order to make this part of the procedure as comfortable as possible.    Getting ready:   A nurse will call you to go over instructions and your health history.  It's important to complete the nurse assessment before your procedure. If you don't, your procedure might have to be canceled.  You must arrange for an adult to drive you home after your exam. Your colonoscopy cannot be done unless you have a ride. If you need to use public transportation, someone must ride with you and stay with you for a minimum of 6-24 hours.  Check with your insurance company to be sure they will cover this exam.  If you have diabetes:  Ask to have your exam early in the morning.  Also, ask your doctor if you should change your diet or medicines.    7 days before the exam:  Talk to your prescribing provider: If you take blood thinners (such as Coumadin, Plavix, Xarelto, Eliquis, Lovenox, or others), these medications may need to be stopped temporarily before your procedure. Your prescribing provider will tell you what to do.   Talk to your prescribing provider: If you take prescription NSAIDS (such as Sulindac, Celebrex, Mobic, Relafen). Your prescribing provider will tell you what to do.   Stop taking fiber supplements (bran, Metamucil, Fibercon), multi-vitamins with  iron, and medicines that contain iron.  Continue taking prescribed aspirin; talk to your prescribing doctor with any concerns.  Stop eating corn, popcorn, nuts and foods that contain seeds. These can stay in the colon for many days and they can clog up the colonoscope.     3 days before the exam:  Begin a low-fiber diet: No raw fruits or vegetables, whole wheat, seeds, nuts, popcorn or other high-fiber foods (see list below). No Olestra (a fat substitute).    One day before the exam:  You can have a light, low-fiber breakfast. But drink only clear liquids after 9 a.m. (see list below). Drink at least 8 to 10 full glasses of clear liquids during the day.   Stop taking NSAID pain relievers, such as Advil, Ibuprofen, Motrin, etc.  You may take Tylenol.  Fill the jug that contains the Golytely powder with warm water. Cover and shake until well mixed. Use a full gallon of water. Chill for 3 hours, but do not add ice.  Note: You will start drinking half of the Golytely solution at 6 p.m. The timing of drinking the 2nd half of the Golytely solution depends on your appointment arrival time. See Steps 1-2 below.    Step One:  At 3 p.m., take 2 tablets of Dulcolax (bisacodyl).  At 6 p.m. start drinking the Golytely solution. Drink an 8-ounce glass every 15 minutes until the jug is half empty. Drink each glass quickly.   After you start drinking the solution, stay near a toilet. You may have watery stools (diarrhea), mild cramping, bloating , and nausea.   You may want to use Vaseline on the skin around your anus after each bowel movement to prevent irritation. You can also use wet wipes to prevent irritation.    Step Two:   If you arrive before 11 AM:  At 11 p.m. on the day before your exam:  Take 2 Dulcolax (Bisacodyl) tablets.   Start drinking the other half of the Golytely jug. Drink one 8-ounce glass every 15 minutes until the jug is empty. Drink each glass quickly.  If you arrive after 11 AM:  At 6 AM on the day of the  exam:  Take 2 tablets of Dulcolax (Bisacodyl).   Drink the other half of the Golytely jug.   Drink one 8-ounce glass every 15 minutes until the jug is empty.   Drink each glass quickly.   You should finish the prep at least 4 hours before the exam.      Day of exam:    You may drink clear liquids only up until 2 hours before your arrival time.  You may take your necessary morning medications with sips of water  Do not take diabetes medicine by mouth until after your exam.  Do not smoke or swallow anything, including water or gum for at least 2 hours before your arrival time. This is a safety issue. Your procedure could be cancelled if you do not follow directions.  No chewing tobacco 6 hours prior to procedure arrival time.   Please do not wear jewelry (i.e. earrings, rings, necklaces, watches, etc) . Leave your purse, billfold, credit cards, and other valuables at home.   Please arrive with a responsible adult who can take you home after the test and stay with you for a minimum of 24 hours: The medicine used will make you sleepy and forgetful. If you do not have someone to take you home, we will cancel your procedure. If using public transportation you must have someone to ride with you.  Please perform your nebulizer treatments and airway clearance therapy in the morning prior to the procedure (if applicable).  If you have asthma, bring your inhalers.      CLEAR LIQUID DIET   You may have:  Water, tea, coffee (no milk or cream)  Soda pop, Gatorade (not red or purple)  Jell-O, Popsicles (no milk or fruit pieces - not red or purple)  Fat-free soup broth or bouillon  Plain hard candy, such as clear life savers (not red or purple)  Clear juices and fruit-flavored drinks, such as apple juice, white grape juice, Hi-C, and Hans-Aid (not red or purple)   Do not have:  Milk or milk products such as ice cream, malts or shakes, or coffee creamer  Red or purple drinks of any kind such as cranberry juice or grape juice. Avoid  red or purple Jell-O, Popsicles, Hans-Aid, sorbet, sherbet and candy  Juices with pulp such as orange, grapefruit, pineapple or tomato juice  Cream soups of any kind  Alcohol and beer  Protein drinks or protein powder     LOW FIBER DIET   You may have:    Starches: White bread, rolls, biscuits, croissants, [place] toast, white flour tortillas, waffles, pancakes, [place] toast; white rice, noodles, pasta, macaroni; cooked and peeled potatoes; plain crackers, saltines; cooked farina or cream of rice; puffed rice, corn flakes, Rice Krispies, Special K   Vegetables: tender cooked and canned, vegetable broths  Fruits and fruit juices: Strained fruit juice, canned fruit without seeds or skin (not pineapple), applesauce, pear sauce, ripe bananas, melons (not watermelon)   Milk products: Milk (plain or flavored), cheese, cottage cheese, yogurt (no berries), custard, ice cream    Proteins: Tender, well-cooked ground beef, lamb, veal, ham, pork, chicken, turkey, fish or organ meats; eggs; creamy peanut butter   Fats and condiments:  Margarine, butter, oils, mayonnaise, sour cream, salad dressing, plain gravy; spices, cooked herbs; sugar, clear jelly, honey, syrup   Snacks, sweets and drinks: Pretzels, hard candy; plain cakes and cookies (no nuts or seeds); gelatin, plain pudding, sherbet, Popsicles; coffee, tea, carbonated ( fizzy ) drinks Do not have:    Starches: Breads or rolls that contain nuts, seeds or fruit; whole wheat or whole grain breads that contain more than 1 gram of fiber per slice; cornbread; corn or whole wheat tortillas; potatoes with skin; brown rice, wild rice, kasha (buckwheat), and oatmeal   Vegetables: Any raw or steamed vegetables; vegetables with seeds; corn in any form   Fruits and fruit juices: Prunes, prune juice, raisins and other dried fruits, berries and other fruits with seeds, canned pineapple juices with pulp such as orange, grapefruit, pineapple or tomato juice  Milk products: Any yogurt with  nuts, seeds or berries   Proteins: Tough, fibrous meats with gristle; cooked dried beans, peas or lentils; crunchy peanut butter  Fats and condiments: Pickles, olives, relish, horseradish; jam, marmalade, preserves   Snacks, sweets and drinks: Popcorn, nuts, seeds, granola, coconut, candies made with nuts or seeds; all desserts that contain nuts, seeds, raisins and other dried fruits, coconut, whole grains or bran.        FAQ:    How do you know if your colon is cleaned out?   After completing the bowel prep, your bowel movements should be all liquid and yellow. Your bowel movements will look similar to urine in the toilet. If there are pieces of stool (poop) in the toilet, or if you can't see to the bottom of the toilet, please call our office for advice. Call [phone] and ask to speak with a nurse.   Why do you need a responsible  to take you home and stay with you?  We require a responsible adult to take you home for your safety. The sedation medicines used to relax you during the procedure can impair your judgement and reaction time, make you forgetful and possible a little unsteady. Do not drive, make any important decisions, or sign any legal documents for 24 hours after your procedure.   It is normal to feel bloated and gassy after your procedure. Walking will help move the air through your colon. You can take non-aspirin pain relievers that contain acetaminophen (Tylenol).   When can you eat after your procedure?  You may resume your normal diet when you feel ready, unless advised otherwise by the doctor performing your procedure. Do not drink alcohol for 24 hours after your procedure.   You many resume normal activities (work, exercise, etc.) after 24 hours.   When will you get test results?  You should have your procedure results and any lab results (if applicable) by letter, MyChart message, or phone call within 2 weeks. If you have any questions, please call the doctor that referred you for the  procedure.       Thank you for choosing [facility], for your procedure. If you are sent a survey regarding your care, please take the time to complete the questionnaire. We value your feedback!             Updated: 6/22/2022

## 2024-04-16 NOTE — Clinical Note
Pt has schizophrenia - daugher cares for her, but requires .  I have ordered colonoscopy, but they will need assistance scheduling this and getting clear instructions on prep - is there a way we can assist her?  Please huddle with me about this for further details.

## 2024-06-04 RX ORDER — BISACODYL 5 MG/1
TABLET, DELAYED RELEASE ORAL
Qty: 4 TABLET | Refills: 0 | Status: SHIPPED | OUTPATIENT
Start: 2024-06-04

## 2024-06-04 NOTE — TELEPHONE ENCOUNTER
Standard Golytely Bowel Prep. Instructions were sent via letter. Bowel prep was sent 6/4/2024 to    [facility] - [address]

## 2024-06-07 DIAGNOSIS — E11.9 TYPE 2 DIABETES MELLITUS WITHOUT COMPLICATION, WITHOUT LONG-TERM CURRENT USE OF INSULIN (H): ICD-10-CM

## 2024-06-07 DIAGNOSIS — R60.0 PEDAL EDEMA: ICD-10-CM

## 2024-06-10 RX ORDER — METFORMIN HCL 500 MG
500 TABLET, EXTENDED RELEASE 24 HR ORAL
Qty: 90 TABLET | Refills: 0 | Status: SHIPPED | OUTPATIENT
Start: 2024-06-10 | End: 2024-06-25

## 2024-06-10 RX ORDER — FUROSEMIDE 20 MG
20 TABLET ORAL DAILY
Qty: 90 TABLET | Refills: 1 | Status: SHIPPED | OUTPATIENT
Start: 2024-06-10 | End: 2024-10-01

## 2024-06-21 ENCOUNTER — TELEPHONE (OUTPATIENT)
Dept: PEDIATRICS | Facility: CLINIC | Age: 59
End: 2024-06-21
Payer: COMMERCIAL

## 2024-06-21 NOTE — TELEPHONE ENCOUNTER
Pt calling to ask when her appt is for next week.     Informed pt the appt is on Tuesday 6/25 at 4:40Pm with Dr. Velez. Pt verbalized understanding    RACIEL Prince

## 2024-06-25 ENCOUNTER — TELEPHONE (OUTPATIENT)
Dept: PEDIATRICS | Facility: CLINIC | Age: 59
End: 2024-06-25

## 2024-06-25 ENCOUNTER — LAB (OUTPATIENT)
Dept: LAB | Facility: CLINIC | Age: 59
End: 2024-06-25
Payer: COMMERCIAL

## 2024-06-25 ENCOUNTER — OFFICE VISIT (OUTPATIENT)
Dept: PEDIATRICS | Facility: CLINIC | Age: 59
End: 2024-06-25
Payer: COMMERCIAL

## 2024-06-25 VITALS
WEIGHT: 231 LBS | SYSTOLIC BLOOD PRESSURE: 112 MMHG | DIASTOLIC BLOOD PRESSURE: 76 MMHG | TEMPERATURE: 97.3 F | HEIGHT: 67 IN | HEART RATE: 106 BPM | BODY MASS INDEX: 36.26 KG/M2 | RESPIRATION RATE: 16 BRPM | OXYGEN SATURATION: 94 %

## 2024-06-25 DIAGNOSIS — E66.01 MORBID OBESITY (H): ICD-10-CM

## 2024-06-25 DIAGNOSIS — E78.00 HYPERCHOLESTEREMIA: ICD-10-CM

## 2024-06-25 DIAGNOSIS — E11.9 TYPE 2 DIABETES MELLITUS WITHOUT COMPLICATION, WITHOUT LONG-TERM CURRENT USE OF INSULIN (H): Primary | ICD-10-CM

## 2024-06-25 DIAGNOSIS — E78.00 HYPERCHOLESTEREMIA: Primary | ICD-10-CM

## 2024-06-25 DIAGNOSIS — E03.9 ACQUIRED HYPOTHYROIDISM: ICD-10-CM

## 2024-06-25 DIAGNOSIS — E11.9 TYPE 2 DIABETES MELLITUS WITHOUT COMPLICATION, WITHOUT LONG-TERM CURRENT USE OF INSULIN (H): ICD-10-CM

## 2024-06-25 DIAGNOSIS — F25.0 SCHIZOAFFECTIVE DISORDER, BIPOLAR TYPE (H): ICD-10-CM

## 2024-06-25 LAB — HBA1C MFR BLD: 6.3 % (ref 0–5.6)

## 2024-06-25 PROCEDURE — 90750 HZV VACC RECOMBINANT IM: CPT | Performed by: INTERNAL MEDICINE

## 2024-06-25 PROCEDURE — 84443 ASSAY THYROID STIM HORMONE: CPT

## 2024-06-25 PROCEDURE — 83036 HEMOGLOBIN GLYCOSYLATED A1C: CPT

## 2024-06-25 PROCEDURE — 80053 COMPREHEN METABOLIC PANEL: CPT

## 2024-06-25 PROCEDURE — 82570 ASSAY OF URINE CREATININE: CPT

## 2024-06-25 PROCEDURE — 36415 COLL VENOUS BLD VENIPUNCTURE: CPT

## 2024-06-25 PROCEDURE — 99214 OFFICE O/P EST MOD 30 MIN: CPT | Mod: 25 | Performed by: INTERNAL MEDICINE

## 2024-06-25 PROCEDURE — 90471 IMMUNIZATION ADMIN: CPT | Performed by: INTERNAL MEDICINE

## 2024-06-25 PROCEDURE — 82043 UR ALBUMIN QUANTITATIVE: CPT

## 2024-06-25 RX ORDER — METFORMIN HCL 500 MG
500 TABLET, EXTENDED RELEASE 24 HR ORAL
Qty: 90 TABLET | Refills: 3 | Status: SHIPPED | OUTPATIENT
Start: 2024-06-25 | End: 2024-10-01

## 2024-06-25 ASSESSMENT — PAIN SCALES - GENERAL: PAINLEVEL: NO PAIN (0)

## 2024-06-25 NOTE — LETTER
June 27, 2024      Jae Ramos  [address] [place] [address]        Dear Jae,     Here are your labs.  Your A1C is at goal and your kidney results are stable/within normal limits. At this time, I do not recommend any changes to your current plan of care.     Please feel free to call with any questions.  Otherwise, we can discuss further at your next appointment.     Sincerely,     Charmaine Velez MD     Resulted Orders   Albumin Random Urine Quantitative with Creat Ratio   Result Value Ref Range    Creatinine Urine mg/dL 130.0 mg/dL      Comment:      The reference ranges have not been established in urine creatinine. The results should be integrated into the clinical context for interpretation.    Albumin Urine mg/L <12.0 mg/L      Comment:      The reference ranges have not been established in urine albumin. The results should be integrated into the clinical context for interpretation.    Albumin Urine mg/g Cr        Comment:      Unable to calculate, urine albumin and/or urine creatinine is outside detectable limits.  Microalbuminuria is defined as an albumin:creatinine ratio of 17 to 299 for males and 25 to 299 for females. A ratio of albumin:creatinine of 300 or higher is indicative of overt proteinuria.  Due to biologic variability, positive results should be confirmed by a second, first-morning random or 24-hour timed urine specimen. If there is discrepancy, a third specimen is recommended. When 2 out of 3 results are in the microalbuminuria range, this is evidence for incipient nephropathy and warrants increased efforts at glucose control, blood pressure control, and institution of therapy with an angiotensin-converting-enzyme (ACE) inhibitor (if the patient can tolerate it).     TSH WITH FREE T4 REFLEX   Result Value Ref Range    TSH 2.22 0.30 - 4.20 uIU/mL   Comprehensive metabolic panel (BMP + Alb, Alk Phos, ALT, AST, Total. Bili, TP)   Result Value Ref Range    Sodium 140 135 -  145 mmol/L    Potassium 4.4 3.4 - 5.3 mmol/L    Carbon Dioxide (CO2) 30 (H) 22 - 29 mmol/L    Anion Gap 11 7 - 15 mmol/L    Urea Nitrogen 7.0 (L) 8.0 - 23.0 mg/dL    Creatinine 0.56 0.51 - 0.95 mg/dL    GFR Estimate >90 >60 mL/min/1.73m2      Comment:      eGFR calculated using 2021 CKD-EPI equation.    Calcium 10.0 8.8 - 10.2 mg/dL    Chloride 99 98 - 107 mmol/L    Glucose 132 (H) 70 - 99 mg/dL    Alkaline Phosphatase 106 40 - 150 U/L    AST 34 0 - 45 U/L      Comment:      Reference intervals for this test were updated on 6/12/2023 to more accurately reflect our healthy population. There may be differences in the flagging of prior results with similar values performed with this method. Interpretation of those prior results can be made in the context of the updated reference intervals.    ALT 39 0 - 50 U/L      Comment:      Reference intervals for this test were updated on 6/12/2023 to more accurately reflect our healthy population. There may be differences in the flagging of prior results with similar values performed with this method. Interpretation of those prior results can be made in the context of the updated reference intervals.      Protein Total 7.5 6.4 - 8.3 g/dL    Albumin 4.1 3.5 - 5.2 g/dL    Bilirubin Total 0.4 <=1.2 mg/dL   Hemoglobin A1c   Result Value Ref Range    Hemoglobin A1C 6.3 (H) 0.0 - 5.6 %      Comment:      Normal <5.7%   Prediabetes 5.7-6.4%    Diabetes 6.5% or higher     Note: Adopted from ADA consensus guidelines.

## 2024-06-25 NOTE — TELEPHONE ENCOUNTER
LVM requesting pt to come in early and have labs drawn prior to appointment.    Aretha Sevilla on 6/25/2024 at 3:19 PM

## 2024-06-25 NOTE — PATIENT INSTRUCTIONS
Diabetes looks excellent - no changes to medications today.  Foot exam is normal.  I will be in touch with results of the remainder of her labs.  Follow up with me in October for your annual physical.

## 2024-06-26 LAB
ALBUMIN SERPL BCG-MCNC: 4.1 G/DL (ref 3.5–5.2)
ALP SERPL-CCNC: 106 U/L (ref 40–150)
ALT SERPL W P-5'-P-CCNC: 39 U/L (ref 0–50)
ANION GAP SERPL CALCULATED.3IONS-SCNC: 11 MMOL/L (ref 7–15)
AST SERPL W P-5'-P-CCNC: 34 U/L (ref 0–45)
BILIRUB SERPL-MCNC: 0.4 MG/DL
BUN SERPL-MCNC: 7 MG/DL (ref 8–23)
CALCIUM SERPL-MCNC: 10 MG/DL (ref 8.8–10.2)
CHLORIDE SERPL-SCNC: 99 MMOL/L (ref 98–107)
CREAT SERPL-MCNC: 0.56 MG/DL (ref 0.51–0.95)
CREAT UR-MCNC: 130 MG/DL
DEPRECATED HCO3 PLAS-SCNC: 30 MMOL/L (ref 22–29)
EGFRCR SERPLBLD CKD-EPI 2021: >90 ML/MIN/1.73M2
GLUCOSE SERPL-MCNC: 132 MG/DL (ref 70–99)
MICROALBUMIN UR-MCNC: <12 MG/L
MICROALBUMIN/CREAT UR: NORMAL MG/G{CREAT}
POTASSIUM SERPL-SCNC: 4.4 MMOL/L (ref 3.4–5.3)
PROT SERPL-MCNC: 7.5 G/DL (ref 6.4–8.3)
SODIUM SERPL-SCNC: 140 MMOL/L (ref 135–145)
TSH SERPL DL<=0.005 MIU/L-ACNC: 2.22 UIU/ML (ref 0.3–4.2)

## 2024-06-26 NOTE — TELEPHONE ENCOUNTER
Per Dr. Cagle patient should be MAC in OR d/t mental health issues.  Sent message to endo scheduling to reschedule.      Geneva Razo RN

## 2024-06-27 ENCOUNTER — TELEPHONE (OUTPATIENT)
Dept: GASTROENTEROLOGY | Facility: CLINIC | Age: 59
End: 2024-06-27
Payer: COMMERCIAL

## 2024-06-27 NOTE — RESULT ENCOUNTER NOTE
Please send result letter.    Dear Jae,    Here are your labs.  Your A1C is at goal and your kidney results are stable/within normal limits.  At this time, I do not recommend any changes to your current plan of care.    Please feel free to call with any questions.  Otherwise, we can discuss further at your next appointment.    Sincerely,    Charmaine Velez MD

## 2024-06-27 NOTE — TELEPHONE ENCOUNTER
Caller: Writer to pt     Reason for Reschedule/Cancellation   (please be detailed, any staff messages or encounters to note?): P needs MAC per RN review      Prior to reschedule please review:  Ordering Provider: Rohit Velez Mai, MD  Sedation Determined: MAC  Does patient have any ASC Exclusions, please identify?: No      Notes on Cancelled Procedure:  Procedure: Lower Endoscopy [Colonoscopy]   Date: 07/02/2024  Location: [facility]; [address] [place] [address]  Surgeon: BRIDGET      Rescheduled: No, LVM for pt to call back       Did you cancel or rescheduled an EUS procedure? No.

## 2024-06-28 NOTE — TELEPHONE ENCOUNTER
Rescheduled: Yes,   Procedure: Lower Endoscopy [Colonoscopy]    Date: 10/02/2024   Location: [facility]; [facility] [address] [place] [address]    Surgeon: Moi    Sedation Level Scheduled  mac ,  Reason for Sedation Level per rn review    Instructions updated and sent: y     Does patient need PAC or Pre -Op Rescheduled? : y patient wants to do with her own primary provider.        Did you cancel or rescheduled an EUS procedure? No.

## 2024-07-11 ENCOUNTER — TELEPHONE (OUTPATIENT)
Dept: GASTROENTEROLOGY | Facility: CLINIC | Age: 59
End: 2024-07-11
Payer: COMMERCIAL

## 2024-07-11 ENCOUNTER — PREP FOR PROCEDURE (OUTPATIENT)
Dept: SURGERY | Facility: CLINIC | Age: 59
End: 2024-07-11
Payer: COMMERCIAL

## 2024-07-11 DIAGNOSIS — Z12.11 SCREEN FOR COLON CANCER: Primary | ICD-10-CM

## 2024-07-11 NOTE — TELEPHONE ENCOUNTER
Caller: Jae    Reason for Reschedule/Cancellation   (please be detailed, any staff messages or encounters to note?): wants earlier in day and at [facility]      Prior to reschedule please review:  Ordering Provider: Rohit Velez Mai,  Sedation Determined: MAC  Does patient have any ASC Exclusions, please identify?: no      Notes on Cancelled Procedure:  Procedure: Lower Endoscopy [Colonoscopy]   Date: 10/2  Location: [facility]; [address] [place] [address]   Surgeon: Moi      Rescheduled: Yes,   Procedure: Lower Endoscopy [Colonoscopy]    Date: 1/9/25   Location: [facility]; [address] [place] [address]   Surgeon: Aleida   Sedation Level Scheduled  MAC ,  Reason for Sedation Level  review   Instructions updated and sent: y     Does patient need PAC or Pre -Op Rescheduled? : n       Did you cancel or rescheduled an EUS procedure? No.

## 2024-07-12 NOTE — TELEPHONE ENCOUNTER
See the 6/27/2024 Telephone Encounter with Gastro  - Patient's colonoscopy has been rescheduled for 1/9/2025     Karely BULLOCK RN   [facility]

## 2024-10-01 ENCOUNTER — OFFICE VISIT (OUTPATIENT)
Dept: PEDIATRICS | Facility: CLINIC | Age: 59
End: 2024-10-01
Payer: COMMERCIAL

## 2024-10-01 VITALS
DIASTOLIC BLOOD PRESSURE: 80 MMHG | OXYGEN SATURATION: 96 % | SYSTOLIC BLOOD PRESSURE: 130 MMHG | BODY MASS INDEX: 35.63 KG/M2 | TEMPERATURE: 97.9 F | HEIGHT: 67 IN | WEIGHT: 227 LBS | HEART RATE: 88 BPM

## 2024-10-01 DIAGNOSIS — E03.9 ACQUIRED HYPOTHYROIDISM: ICD-10-CM

## 2024-10-01 DIAGNOSIS — K21.9 GASTROESOPHAGEAL REFLUX DISEASE WITHOUT ESOPHAGITIS: ICD-10-CM

## 2024-10-01 DIAGNOSIS — R19.5 POSITIVE COLORECTAL CANCER SCREENING USING COLOGUARD TEST: ICD-10-CM

## 2024-10-01 DIAGNOSIS — F25.0 SCHIZOAFFECTIVE DISORDER, BIPOLAR TYPE (H): ICD-10-CM

## 2024-10-01 DIAGNOSIS — Z00.00 ROUTINE GENERAL MEDICAL EXAMINATION AT A HEALTH CARE FACILITY: Primary | ICD-10-CM

## 2024-10-01 DIAGNOSIS — E11.9 TYPE 2 DIABETES MELLITUS WITHOUT COMPLICATION, WITHOUT LONG-TERM CURRENT USE OF INSULIN (H): ICD-10-CM

## 2024-10-01 DIAGNOSIS — R53.83 OTHER FATIGUE: ICD-10-CM

## 2024-10-01 DIAGNOSIS — R60.0 PEDAL EDEMA: ICD-10-CM

## 2024-10-01 DIAGNOSIS — E78.00 HYPERCHOLESTEREMIA: ICD-10-CM

## 2024-10-01 LAB
ERYTHROCYTE [DISTWIDTH] IN BLOOD BY AUTOMATED COUNT: 15.1 % (ref 10–15)
EST. AVERAGE GLUCOSE BLD GHB EST-MCNC: 143 MG/DL
HBA1C MFR BLD: 6.6 % (ref 0–5.6)
HCT VFR BLD AUTO: 49.2 % (ref 35–47)
HGB BLD-MCNC: 15 G/DL (ref 11.7–15.7)
MCH RBC QN AUTO: 27.9 PG (ref 26.5–33)
MCHC RBC AUTO-ENTMCNC: 30.5 G/DL (ref 31.5–36.5)
MCV RBC AUTO: 91 FL (ref 78–100)
PLATELET # BLD AUTO: 198 10E3/UL (ref 150–450)
RBC # BLD AUTO: 5.38 10E6/UL (ref 3.8–5.2)
WBC # BLD AUTO: 6.8 10E3/UL (ref 4–11)

## 2024-10-01 PROCEDURE — 85027 COMPLETE CBC AUTOMATED: CPT | Performed by: INTERNAL MEDICINE

## 2024-10-01 PROCEDURE — T1013 SIGN LANG/ORAL INTERPRETER: HCPCS | Mod: U4

## 2024-10-01 PROCEDURE — 82306 VITAMIN D 25 HYDROXY: CPT | Performed by: INTERNAL MEDICINE

## 2024-10-01 PROCEDURE — 99214 OFFICE O/P EST MOD 30 MIN: CPT | Mod: 25 | Performed by: INTERNAL MEDICINE

## 2024-10-01 PROCEDURE — 83036 HEMOGLOBIN GLYCOSYLATED A1C: CPT | Performed by: INTERNAL MEDICINE

## 2024-10-01 PROCEDURE — 36415 COLL VENOUS BLD VENIPUNCTURE: CPT | Performed by: INTERNAL MEDICINE

## 2024-10-01 PROCEDURE — 91320 SARSCV2 VAC 30MCG TRS-SUC IM: CPT | Performed by: INTERNAL MEDICINE

## 2024-10-01 PROCEDURE — 84443 ASSAY THYROID STIM HORMONE: CPT | Performed by: INTERNAL MEDICINE

## 2024-10-01 PROCEDURE — 99396 PREV VISIT EST AGE 40-64: CPT | Mod: 57 | Performed by: INTERNAL MEDICINE

## 2024-10-01 PROCEDURE — 90673 RIV3 VACCINE NO PRESERV IM: CPT | Performed by: INTERNAL MEDICINE

## 2024-10-01 PROCEDURE — 90471 IMMUNIZATION ADMIN: CPT | Performed by: INTERNAL MEDICINE

## 2024-10-01 PROCEDURE — 90480 ADMN SARSCOV2 VAC 1/ONLY CMP: CPT | Performed by: INTERNAL MEDICINE

## 2024-10-01 RX ORDER — METFORMIN HCL 500 MG
500 TABLET, EXTENDED RELEASE 24 HR ORAL
Qty: 90 TABLET | Refills: 3 | Status: SHIPPED | OUTPATIENT
Start: 2024-10-01

## 2024-10-01 RX ORDER — ATORVASTATIN CALCIUM 40 MG/1
40 TABLET, FILM COATED ORAL DAILY
Qty: 90 TABLET | Refills: 3 | Status: SHIPPED | OUTPATIENT
Start: 2024-10-01

## 2024-10-01 RX ORDER — CALCIUM CARBONATE 500(1250)
1 TABLET ORAL 2 TIMES DAILY
Qty: 180 TABLET | Refills: 3 | Status: SHIPPED | OUTPATIENT
Start: 2024-10-01

## 2024-10-01 RX ORDER — OMEPRAZOLE 40 MG/1
CAPSULE, DELAYED RELEASE ORAL
Qty: 90 CAPSULE | Refills: 3 | Status: SHIPPED | OUTPATIENT
Start: 2024-10-01

## 2024-10-01 RX ORDER — LEVOTHYROXINE SODIUM 75 UG/1
TABLET ORAL
Qty: 90 TABLET | Refills: 3 | Status: SHIPPED | OUTPATIENT
Start: 2024-10-01

## 2024-10-01 RX ORDER — FUROSEMIDE 20 MG
20 TABLET ORAL DAILY
Qty: 90 TABLET | Refills: 1 | Status: SHIPPED | OUTPATIENT
Start: 2024-10-01

## 2024-10-01 ASSESSMENT — PAIN SCALES - GENERAL: PAINLEVEL: NO PAIN (0)

## 2024-10-01 NOTE — PROGRESS NOTES
Preventive Care Visit  [facility] MARIELLE Velez MD, Internal Medicine - Pediatrics  Oct 1, 2024      Assessment & Plan     Routine general medical examination at a health care facility  - calcium carbonate (OS-LEANDRA) 500 MG tablet; Take 1 tablet (500 mg) by mouth 2 times daily.    Other fatigue  Will check additional labs.  This may be age related, but daughter feels she is abnormally tired.  - Vitamin D Deficiency; Future  - CBC with platelets; Future  - TSH with free T4 reflex; Future  - Vitamin D Deficiency  - CBC with platelets  - TSH with free T4 reflex    Type 2 diabetes mellitus without complication, without long-term current use of insulin (H)  - Stable, no side effects with medications, refilled meds today  - due for labs.  - HEMOGLOBIN A1C; Future  - empagliflozin (JARDIANCE) 10 MG TABS tablet; Take 1 tablet (10 mg) by mouth daily.  - metFORMIN (GLUCOPHAGE XR) 500 MG 24 hr tablet; Take 1 tablet (500 mg) by mouth daily (with dinner).  - HEMOGLOBIN A1C    Acquired hypothyroidism  - Stable, no side effects with medications, refilled meds today  - due for labs.  - levothyroxine (SYNTHROID/LEVOTHROID) 75 MCG tablet; TAKE 1 TABLET(75 MCG) BY MOUTH DAILY    Hypercholesteremia  - Stable, no side effects with medications, refilled meds today  - atorvastatin (LIPITOR) 40 MG tablet; Take 1 tablet (40 mg) by mouth daily.    Pedal edema  - Stable, no side effects with medications, refilled meds today  - furosemide (LASIX) 20 MG tablet; Take 1 tablet (20 mg) by mouth daily.    Schizoaffective disorder, bipolar type (H)  Stable, meds per psychiatry.    Gastroesophageal reflux disease without esophagitis  - omeprazole (PRILOSEC) 40 MG DR capsule; Take one tablet BID for 14 days, then once daily.    Positive colorectal cancer screening using Cologuard test  Has colonoscopy scheduled.  Daughter plans to do prep at home.            See Patient Instructions    Sofya Rowe is a 59 year old,  presenting for the following:  Physical        10/1/2024     4:14 PM   Additional Questions   Roomed by Nusrat Kulkarni CMA   Accompanied by N/A         10/1/2024     4:14 PM   Patient Reported Additional Medications   Patient reports taking the following new medications N/A        Health Care Directive  Patient has a Health Care Directive on file  Advance care planning document is on file and is current.    HPI    Colonoscopy - positive cologuard   - medicine to clean up the colon  - Cannot do the prep at home  - admitted the night before for prep    Hypothyroidism:  TSH   Date Value Ref Range Status   06/25/2024 2.22 0.30 - 4.20 uIU/mL Final   06/08/2022 0.83 0.40 - 4.00 mU/L Final   03/15/2021 2.10 0.40 - 4.00 mU/L Final     Hyperlipidemia:  Lab Results   Component Value Date    ALT 39 06/25/2024    ALT 19 08/26/2020     LDL Cholesterol Calculated   Date Value Ref Range Status   12/12/2023 49 <=100 mg/dL Final   08/26/2020 82 <100 mg/dL Final     Comment:     Desirable:       <100 mg/dl     DM:  On jardiance 10 and metformin 500 mg daily  Lab Results   Component Value Date    A1C 6.3 06/25/2024    A1C 7.0 03/18/2024    A1C 7.0 12/12/2023    A1C 6.5 04/12/2023    A1C 6.4 10/06/2022    A1C 6.6 08/26/2020    A1C 6.1 10/25/2019    A1C 6.8 06/27/2019    A1C 6.3 10/18/2018       On furosemide daily:  Last Comprehensive Metabolic Panel:  Lab Results   Component Value Date     06/25/2024    POTASSIUM 4.4 06/25/2024    CHLORIDE 99 06/25/2024    CO2 30 (H) 06/25/2024    ANIONGAP 11 06/25/2024     (H) 06/25/2024    BUN 7.0 (L) 06/25/2024    CR 0.56 06/25/2024    GFRESTIMATED >90 06/25/2024    LEANDRA 10.0 06/25/2024                    No data to display                  10/22/2018   Nutrition   Three or more servings of calcium each day? Yes   Diet: regular (no restrictions)            10/22/2018   Exercise   Frequency of exercise: None               10/22/2018   Dental   Dentist two times every year? Yes          "            10/22/2018   Substance Use   Alcohol more than 3/day or more than 7/wk Not Applicable        Social History     Tobacco Use    Smoking status: Never     Passive exposure: Never    Smokeless tobacco: Never   Vaping Use    Vaping status: Never Used   Substance Use Topics    Alcohol use: No    Drug use: No           1/31/2024   LAST FHS-7 RESULTS   1st degree relative breast or ovarian cancer No   Any relative bilateral breast cancer No   Any male have breast cancer No   Any ONE woman have BOTH breast AND ovarian cancer No   Any woman with breast cancer before 50yrs No   2 or more relatives with breast AND/OR ovarian cancer No   2 or more relatives with breast AND/OR bowel cancer No           Mammogram Screening - Mammogram every 1-2 years updated in Health Maintenance based on mutual decision making      History of abnormal Pap smear: shared decision making to discontinue pap smears.       ASCVD Risk   The 10-year ASCVD risk score (Robert FERREIRA, et al., 2019) is: 7.6%    Values used to calculate the score:      Age: 59 years      Sex: Female      Is Non- : Yes      Diabetic: Yes      Tobacco smoker: No      Systolic Blood Pressure: 112 mmHg      Is BP treated: No      HDL Cholesterol: 36 mg/dL      Total Cholesterol: 139 mg/dL           Reviewed and updated as needed this visit by Provider                          Review of Systems  All other systems on a 10-point review are negative, unless otherwise noted in HPI       Objective    Exam  There were no vitals taken for this visit.   Estimated body mass index is 36.18 kg/m  as calculated from the following:    Height as of 6/25/24: 1.702 m (5' 7\").    Weight as of 6/25/24: 104.8 kg (231 lb).    Physical Exam  GENERAL: alert and no distress  EYES: Eyes grossly normal to inspection, PERRL and conjunctivae and sclerae normal  HENT: ear canals and TM's normal, nose and mouth without ulcers or lesions  NECK: no adenopathy, no " asymmetry, masses, or scars  RESP: lungs clear to auscultation - no rales, rhonchi or wheezes  CV: regular rate and rhythm, normal S1 S2, no S3 or S4, no murmur, click or rub, no peripheral edema  ABDOMEN: soft, nontender, no hepatosplenomegaly, no masses and bowel sounds normal  MS: no gross musculoskeletal defects noted, no edema  SKIN: no suspicious lesions or rashes  NEURO: Normal strength and tone, mentation intact and speech normal  PSYCH: mentation appears normal, affect normal/bright        Signed Electronically by: Charmaine Velez MD

## 2024-10-01 NOTE — Clinical Note
Please have nurse call ahead of colonoscopy on 1/9 with  to ensure she has all instructions for colon prep

## 2024-10-01 NOTE — LETTER
October 5, 2024      Jae Ramos  [address] [place] [address]        Dear Jae,     Your lab results are all stable.  The A1C is still at goal.  Please ignore some of the red flags, as they represent normal variations.  At this time, I do not recommend any changes to your current plan of care.     Please feel free to call with any questions.  Otherwise, we can discuss further at your next appointment.     Sincerely,     Charmaine Velez MD     Resulted Orders   HEMOGLOBIN A1C   Result Value Ref Range    Estimated Average Glucose 143 (H) <117 mg/dL    Hemoglobin A1C 6.6 (H) 0.0 - 5.6 %      Comment:      Normal <5.7%   Prediabetes 5.7-6.4%    Diabetes 6.5% or higher     Note: Adopted from ADA consensus guidelines.   Vitamin D Deficiency   Result Value Ref Range    Vitamin D, Total (25-Hydroxy) 28 20 - 50 ng/mL      Comment:      optimum levels    Narrative    Season, race, dietary intake, and treatment affect the concentration of 25-hydroxy-Vitamin D. Values may decrease during winter months and increase during summer months.    Vitamin D determination is routinely performed by an immunoassay specific for 25 hydroxyvitamin D3.  If an individual is on vitamin D2(ergocalciferol) supplementation, please specify 25 OH vitamin D2 and D3 level determination by LCMSMS test VITD23.     CBC with platelets   Result Value Ref Range    WBC Count 6.8 4.0 - 11.0 10e3/uL    RBC Count 5.38 (H) 3.80 - 5.20 10e6/uL    Hemoglobin 15.0 11.7 - 15.7 g/dL    Hematocrit 49.2 (H) 35.0 - 47.0 %    MCV 91 78 - 100 fL    MCH 27.9 26.5 - 33.0 pg    MCHC 30.5 (L) 31.5 - 36.5 g/dL    RDW 15.1 (H) 10.0 - 15.0 %    Platelet Count 198 150 - 450 10e3/uL   TSH with free T4 reflex   Result Value Ref Range    TSH 1.62 0.30 - 4.20 uIU/mL

## 2024-10-01 NOTE — PATIENT INSTRUCTIONS
"Patient Education   Talada Daryeelka Ka   Hortaga ah  [place] waa talo guud oo aan inta badan bixino si aan dadka uga caawino inay caafimaad qabaan. Kooxdaada daryeelka ayaa laga yaabaa inay kuu hayaan talo kuu gaar ah. Fadlan valarie hadal kooxdaada daryeelka baahiyahaaga daryeelka ee ka hortaga.  Hab Nololeedka  Samee jimicsi ugu yaraan 150 daqiiqo todobaad kasta (30 daqiiqo maalintii, 5 maalmood todobaadkii).  Samee hawlaha xoojiya murqaha 2 maalmood todobaadkii. Kuwani waxay kaa caawinayaan xakamaynta miisaankaaga iyo ka hortaga cudurada.  Lama ogola sigaar cabista  Xiro muraayadaha qorraxda celiya si aad uga hortagto kansarka maqaarka.  Gurigaaga ha laga duc gaaska radon 2 ilaa 5-tii sanaba mar. Radon waa gaas aan midab lahayn, oo aan ur lahayn oo wax yeeli rakesh sambabadaada. Si aad wax badan uga ogaato, aad [email or web address].mn. oo raadi \"Radon in Homes.\"  Hay qoryaha iyagoo aan rasaas ku jirin oo ku xir goob badqab leh sida khasnadda badqab leh ama isticmaal khaanada qoryaha, oo qari furayaasha. Markasta si gooni ah ugu quful khaanad rasaasta. Si aad wax badan uga ogaato, booqo dps.mn.gov oo raadi \"safe gun storage.\"  Nafaqada  Cun 5 cunto ama ka badan oo khudaar iyo aries ah maalin kasta.  Isku day rootiga qamadiga ka samaysan, bariiska buniga ah iyo baastada (badalkii rootiga cad, bariiska, iyo baasto).  Hel calcium iyo vitamin D kugu filan. Hubi calaamadda cuntooyinka oo ujeedo 100% ee RDA (kaalmada maalinlaha ah ee lagu talyassineyay).  Baaritaano joogta ah  Samee baaritaanka ilkaha iyo nadiifinta 6 bilood kasta.  Arag kooxdaada daryeelka caafimaadka sanad kasta si aad ugala hadasho:  Isbadal kasta oo ku yimaadda caafimaadkaaga.  Daawooyin kasta oo kooxdaada daryeelka kuu qoreen.  Daryeelka ka hortagga, qorshaynta dhalmada qoyska, iyo siyaabaha looga hortago cudurada daba dheeraada.  Talaakaylie (talaaelian)   Galina HPV (ilaa da'da 26), haddii aadan waligaa hore u qaadanin.  Galina reyez B (ilaa da'da " 59),haddi aanad hore u qaadanin.  laura COVID-19: Qaado laura marka ay dhamaato.  Laura reesebliya: qaado jamesmaribellliya liudmila sannad kasta.  Laura Aldrich: Qaado 10-ki sanaba mar.  Jhonaamiky riveraukiitada, cagaarshawa A, iyo Tallaalka RSV: Weydii kooxdaada daryeelka haddii aad u baahan tahay kuwaas oo ku salaysan khatarta aad ku jirto.  Laura nielson (loogu talagaly da'da 49 iyo ka weyn).  Baaritaanada [facility]  Baaritaanka sorowga:  Laga bilaabo da'da 35, Iska baar sokorowga ugu yaraan 3 sanaba mar.  Haddii aad ka asael tahay da'da 35, waydii kooxdaada daryeelka haddii ay tahay in [facility].  Baaritaanka Kolestoroolka: Da'da 39, bilow inaad iska baDawsono kolestaroolka 5 sanaba mar, ama marar badan haddii lagu taliyey.  Baaritaanka Cufnaanta Lafta (DEXA): Da'da 49,Waydii kooxdaada daryeelka haddii ay tahay in [facility] baaritaanka jilicnaanta lafaha).  Bhanu C: Iska baar ugu yaraan pretty mar noloshaada.  Baaritaanka god ku yaala Xididka Dhiiga ka Qaada Wadnaha: Valarie hadal dhakhtarkaaga baaritaankan haddii aad:  Weligaa Sigaar ma cabtay; oo  Aadiga ma lab tahay; oo  da'da u dhaxayso 64 iyo 74.  Cudurada galmada lagu valarie qaado (STIs)  Kahor da'da 24:  Weydii kooxdaada daryeelka haddii ay tahay in [facility] baaro Cudurada galmada [facility] qaado (STIs).  Kadib da'da 24: Iska baar Cudurada galmada lagu valarie qaado (STIs) haddii aad halis ugu jirto. Aad halis ugu jirto [facility] valarie qaado (STIs) (oo ay ku jiraan HIV) haddii:  Hadii aad galmo la samaysay pretty qof ka badan.  Aadan isticmaalin cinjirka galmada wakhti kasta.  Adiga ama lamaanahaaga laga helay cur [facility] valarie qaado [facility].  Hadii aad halis ugu jirto HIV, wax ka waydii daawada PrEP si aad uga hortagto HIV.  Iska baar HIV ugu yaraan pretty mar noloshaada, haddii aad halis ugu jirto HIV iyo haddii kale.  Baaritaanada Kansarka  Baaritaanada Kansarka ee Xubinta Taranka [facility]: Haddii aad dumar tahaypreeti  si joogto ah u hesho baaritaanka [facility] qeybta hore ee ilmo xubinta taranka da'da 21. Inta badan dadka sameeya baaritaanada caadiga ah ee leh natiijooyinka caadiga ah waxay joogsan karaan da'da 64 kadib. Midan valarie hadal [facility].  Baaritaanka [facility] naasaha (baaritaanka sawiritaanka ee [facility]): Haddii aad naaso leedahay, bilaw inaad ka baarto naasahaaga [facility] naasaha si joogto ahda'da 40. Kani waa baaritaan raajo oo lagu baaro [facility].  Baaritaanka [facility] xiid-maha waaweyn: Waa muhiim in la bilaabo [facility] waaweyn da'da 45.  Samee baaritaanka [facility] malawadka 10-ki sanaba mar (ama in ka badan haddii aad halis ugu jirto) [place], weydii bixiyahaaga baaritaanada saxarada sida imtixaanka FIT sanad walba ama baaritaanka Cologuard 3-dii sanaba mar.  Si aad wax badan uga barato ikhtiyamagdi gonzalezo: [email or web address].  Gagandeepwimaada go'aan magdi priceo: bit.ly/xj76812.  Baaritaanka [facility] kaadi mareenka raga: Hadii aad rehan tahay aad jirto da'da 55 ilaa 69, ka codso daryeel bixiyahaaga haddii aad ka faa'iidaysan lahayd baaritaanka [facility] kaadi mareenka ee sannadkiiba mar.  Baaritaanka [facility] Sambabada: Hadii aad hada sigaar cabto ama aad horaan u cabaysay oo aad jirto da'da 50 ilaa 80, ka codso kooxdaada daryeelka haddii baaritaanka [facility] socda uu kugu habboon yahay.    Ujeeddooyin wargelin oo kaliya. Luba johnson inay beddel u noqoto talada mariano. Greyquuqda janncashelleya   20[address] [facility]. [facility] Medical Centerfelicitas duncanquuqdu [facility] brittany ash. Waxaa caafimaad ahaan britta u eegay [facility] Zane Prep [id number] - REV 04/24.

## 2024-10-02 LAB
TSH SERPL DL<=0.005 MIU/L-ACNC: 1.62 UIU/ML (ref 0.3–4.2)
VIT D+METAB SERPL-MCNC: 28 NG/ML (ref 20–50)

## 2024-10-04 NOTE — RESULT ENCOUNTER NOTE
Please send result letter.    Dear Jae,    Your lab results are all stable.  The A1C is still at goal.  Please ignore some of the red flags, as they represent normal variations.  At this time, I do not recommend any changes to your current plan of care.    Please feel free to call with any questions.  Otherwise, we can discuss further at your next appointment.    Sincerely,    Charmaine Velez MD

## 2024-11-01 ENCOUNTER — TELEPHONE (OUTPATIENT)
Dept: PEDIATRICS | Facility: CLINIC | Age: 59
End: 2024-11-01
Payer: COMMERCIAL

## 2024-11-01 NOTE — TELEPHONE ENCOUNTER
Forms/Letter Request    Type of form/letter: OTHER: [employer]        Do we have the form/letter: Yes: Form is on the provider's desk for review      Who is the form from? MEDICAL [employer]PPL[employer]    Where did/will the form come from? form was faxed in    When is form/letter needed by: ASAP

## 2024-11-04 ENCOUNTER — MEDICAL CORRESPONDENCE (OUTPATIENT)
Dept: HEALTH INFORMATION MANAGEMENT | Facility: CLINIC | Age: 59
End: 2024-11-04
Payer: COMMERCIAL

## 2024-11-27 ENCOUNTER — TELEPHONE (OUTPATIENT)
Dept: PEDIATRICS | Facility: CLINIC | Age: 59
End: 2024-11-27
Payer: COMMERCIAL

## 2024-11-27 NOTE — TELEPHONE ENCOUNTER
Forms/Letter Request    Type of form/letter: MAVERICK     Do we have the form/letter: Yes: Form is on the provider's desk for review    Who is the form from? [employer]    Where did/will the form come from? form was faxed in    When is form/letter needed by: ASAP

## 2024-12-02 ENCOUNTER — MEDICAL CORRESPONDENCE (OUTPATIENT)
Dept: HEALTH INFORMATION MANAGEMENT | Facility: CLINIC | Age: 59
End: 2024-12-02
Payer: COMMERCIAL

## 2024-12-20 ENCOUNTER — TELEPHONE (OUTPATIENT)
Dept: GASTROENTEROLOGY | Facility: CLINIC | Age: 59
End: 2024-12-20
Payer: COMMERCIAL

## 2024-12-20 NOTE — TELEPHONE ENCOUNTER
Pre visit planning completed. Patient has legal guardian-ensure they will be available day of procedure to sign/give verbal consent.       Procedure details:    Patient scheduled for Colonoscopy on 01.09.2025.     Arrival time: 0815. Procedure time 1015    Facility location: [facility]; [address] [place] [address]. Check in location: Surgery entrance on the South side of building.     Sedation type: MAC    Pre op exam needed? Yes. Patient needs to complete an in person pre-operative exam within 30 days of procedure. Informed patient pre op is needed via letter.    Indication for procedure:   Screen for colon cancer            Chart review:     Electronic implanted devices? No    Recent diagnosis of diverticulitis within the last 6 weeks? No      Medication review:    Diabetic? Yes. Oral diabetic medications: Jardiance (empagliflozin): HOLD  3 days before procedure.  Metformin (glucophage): HOLD day of procedure.    Anticoagulants? No    Weight loss medication/injectable? No GLP-1 medication per patient's medication list. Nursing to verify with pre-assessment call.    Other medication HOLDING recommendations:  N/A      Prep for procedure:     Bowel prep recommendation: Standard Golytely. Bowel prep sent to    [facility] - [address] [place] to: diabetes    Procedure information and instructions sent via letter         Valerie Razo RN  Endoscopy Procedure Pre Assessment   [phone] option 2

## 2024-12-20 NOTE — LETTER
December 20, 2024      Jae Ramos  [address] [place] [address]              Dear Jae,    Colonoscopy     Procedure date: 01.09.2025    Anticipated arrival time: 8:15 AM   (Please note that arrival times may change)    [facility] [place] [address] Check in location: Surgery entrance on the South side of building.     A Pre-Operative Physical will be required for your upcoming Endoscopy procedure.     Please be sure to schedule a Pre-Operative physical with your primary care provider within 30 days of your procedure date.      An in-person clinic appointment can also be scheduled with our Pre Anesthesia Clinic (PAC) at [facility].      Virtual PAC appointments will not be accepted.     To schedule an in person PAC visit, please call the Pre-Assessment Clinic at [phone].       Important Procedure Reminders:     Prep Instructions:   Instructions on how to prepare for your upcoming procedure are found below. Please read instructions carefully. Deviation from instructions may result in less than desired outcomes and procedure may need to be rescheduled.   If you have additional questions regarding how to prepare for your upcoming procedure, contact our endoscopy pre assessment nurses at [phone] option 2 Monday through Friday 7:00am-5:00pm.      Policy:   The medications used during the procedure will make you sleepy, so you won't be able to drive. On the day of your procedure, please have an adult ready to drive you home and stay with you for the next 24 hours.   You can't use public transportation, ride-share services, or non-medical taxi services without a responsible caregiver. Medical transport services are okay, but a caregiver must be there to receive you at your destination.   Make sure your  and caregiver are confirmed before your procedure.    Day of procedure:  Please keep in mind  that arrival times may change. Let your  know there might be a one-hour window for changes.  We ask that you please check in at the  with your . Your  should remain on campus.  Expect to be at the procedure center for about 1.5-2.5 hours.    Please do not wear jewelry (i.e. earrings, rings, necklaces, watches, etc). Leave your purse, billfold, credit cards, and other valuables at home.   Bring insurance card and ID.     To cancel or reschedule your procedure:   If you need to cancel or reschedule, our endoscopy scheduling team can be reached at [phone], option 2. Monday through Friday, 7:00am-5:00pm.    Medication Reminders:    Please note the following medication holding recommendations:   Oral diabetic medication(s): Jardiance (empagliflozin): HOLD  3 days before procedure.  Metformin (glucophage): HOLD day of procedure..    ---------------------------------------------------------------------------------------------------------------------------------------------------------------------------------------------------------------    Standard Golytely (Colyte, Nulytely) Bowel Prep   Prep instructions for your colonoscopy     [facility]; [address] [place] [address]  For prep questions, please call: [facility] - [phone] option 2  Your colonoscopy prep prescription has been sent to    [facility] - [address]         Please read these instructions carefully at least 7 days prior to your colonoscopy procedure. Be sure to follow all directions completely. The inside of your colon must be clean to allow for a complete examination for the presence of any growths, polyps, and/or abnormalities, as well as their biopsy or removal. A number of tips are included in order to make this part of the procedure as comfortable as possible.    Getting ready    prescription at the pharmacy. Endoscopy team will order this about  2 weeks before to your scheduled procedure. Included in the kit will be the followin Dulcolax (Bisacodyl) 5mg tablets  1 container of Polyethylene Glycol (Golytely, Colyte, Nulytely, Gavilyte-G)    A nurse will call you to go over your appointment details and prep instructions. Not completing the nurse call could result with your appointment being cancelled.    You must arrange for an adult to drive you home after your exam. Your colonoscopy cannot be done unless you have a ride. If you need to use public transportation, someone must ride with you and stay with you for up to 24 hours.       7 days before procedure   Medications that may need to be held before procedure:     GLP-1 agonist medication for diabetes or weight loss: such as Mounjaro (Tirzepatide).  Ozempic (Semaglutide). Rybelsus (Semaglutide), Tirzepatide-Weight Management (Zepbound), Wegovy (Semaglutide) or others, holding times may vary based on how you take this medication. This may be up to a 7 day hold. Our pre assessment nurses will call and discuss holding recommendations 1-2 weeks before scheduled procedure.     Blood thinning and/or anti platelet medications: such as Coumadin, Plavix, Xarelto, Eliquis, Lovenox or others, ask your your prescribing provider about holding recommendations.     If you take insulin for diabetes, ask your prescribing provider for instructions on how to manage this medication while preparing for a colonoscopy.     Stop taking iron (ferrous sulfate), multivitamins that contain iron, and/or fiber supplements (Metamucil, Benefiber, Psyllium husk powder, Fibercon, Bran, etc.) 7 days before procedure.     Stop eating whole kernel corn, popcorn, nuts, and foods that contain seeds. These can stay in the colon for many days and they can clog up the colonoscope.       3 days before procedure     Begin a low-fiber diet (see examples below). No Olestra (a fat substitute).    Consume no more than 10-15 grams of fiber each  day.     It is important to stay hydrated. Drink at least eight 8-ounce glasses of water a day.      LOW FIBER DIET   You can have:   Do not have:    Starches: White bread, rolls, biscuits, croissants, Lancing toast, white flour tortillas, waffles, pancakes, [place] toast; white rice, noodles, pasta, macaroni; cooked and peeled potatoes; plain crackers, saltines; cooked farina or cream of rice; puffed rice, corn flakes, Rice Krispies, Special K      Vegetables: tender cooked and canned, vegetable broths     Fruits and fruit juices: Strained fruit juice, canned fruit without seeds or skin (not pineapple), applesauce, pear sauce, ripe bananas, melons (not watermelon)     Milk products: Milk (plain or flavored), cheese, cottage cheese, yogurt (no berries), custard, ice cream       Proteins: Tender, well-cooked ground beef, lamb, veal, ham, pork, chicken, turkey, fish or organ meat, Tofu, eggs, creamy peanut butter      Fats and condiments:  Margarine, butter, oils, mayonnaise, sour cream, salad dressing, plain gravy; spices, cooked herbs; sugar, clear jelly, honey, syrup      Snacks, sweets and drinks: Pretzels, hard candy; plain cakes and cookies (no nuts or seeds); gelatin, plain pudding, sherbet, Popsicles; coffee, tea, carbonated ( fizzy ) drinks    Starches: Breads or rolls that contain nuts, seeds or fruit; whole wheat or whole grain breads that contain more than 2 grams of fiber per serving; cornbread; corn or whole wheat tortillas; potatoes with skin; brown rice, wild rice, quinoa, kasha (buckwheat), and oatmeal      Vegetables: Any raw or steamed vegetables; vegetables with seeds; corn in any form      Fruits and fruit juices: Prunes, prune juice, raisins and other dried fruits, berries and other fruits with seeds, canned pineapple juices with pulp such as orange, grapefruit, pineapple or tomato juice     Milk products: Any yogurt with nuts, seeds or berries      Proteins: Tough, fibrous meats with gristle;  cooked dried beans, peas or lentils; crunchy peanut butter     Fats and condiments: Pickles, olives, relish, horseradish; jam, marmalade, preserves      Snacks, sweets and drinks: Popcorn, nuts, seeds, granola, coconut, candies made with nuts or seeds; all desserts that contain nuts, seeds, raisins and other dried fruits, coconut, whole grains or bran.                                               1 day before procedure     You can have a light, low-fiber breakfast. But drink only clear liquids after 9 a.m. (see list below).    Drink at least eight to ten 8-ounce glasses of water throughout the day. ? ? ? ? ? ? ? ?    Fill the container of Golytely with a full gallon of warm water. Cover and shake until well mixed. Chill for 3 hours in refrigerator until it is time to drink solution.    CLEAR LIQUID DIET:  You can have: Do not have:    Water, tea, coffee (no milk or cream)   Soda pop, Gatorade (not red or purple)   Coconut water   Jell-O, Popsicles (no milk or fruit pieces - not red or purple)   Fat-free soup broth or bouillon   Plain hard candy, such as clear life savers (not red or purple)   Clear juices and fruit-flavored drinks, such as apple juice, white grape juice, Hi-C, and Hans-Aid (not red or purple)  Milk or milk products such as ice cream, malts or shakes, or coffee creamer   Red or purple drinks of any kind such as cranberry juice, grape juice or Hans-Aid. Avoid red or purple Jell-O, Popsicles, sorbet, sherbet and candy   Juices with pulp such as orange, grapefruit, pineapple or tomato juice   Cream soups of any kind   Alcohol and beer   Protein drinks or protein powder     Step 1     At 3 PM, take 2 Dulcolax (Bisacodyl) tablets.   At 6 PM, start drinking one 8-ounce glass of Golytely mixture every 15 minutes until the container is HALF empty. Drink each glass quickly. Store the rest in the refrigerator.   Continue to drink clear liquids    Step 2     If you arrive for your procedure BEFORE 11 AM:  At  11 PM, take 2 Dulcolax (Bisacodyl) tablets  At 11 PM, start drinking the other half of the Golytely container. Drink one 8-ounce glass every 15 minutes until the container is empty. Drink each glass quickly.    If you arrive for your procedure AFTER 11 AM:  At 6 AM, take 2 Dulcolax (Bisacodyl) tablets  At 6 AM, start drinking the other half of the Golytely container. Drink one 8-ounce glass of Golytely mixture every 15 minutes until the container is empty. Drink each glass quickly.       Reminders While Drinking Laxatives:     After you start drinking the solution, stay near a toilet. You may have watery stools (diarrhea), mild cramping, bloating, and nausea. You may want to use Vaseline on the skin around your anus after each bowel movement or use wet wipes to prevent irritation. Bowel movements will be liquid and dark in color at first and then should turn clear yellow in color. Drinking the prepared solution may make you cold, wearing warm clothing can be helpful.    Some find it helpful to:  For added flavor, include a crystal light lemonade packet in each glass of Golytely, rather than mixing it into the entire prepared mixture.   In between each glass, try sucking on a lemon or a piece of hard candy to help diminish the flavor of the preparation.  Drinking from a straw can help minimize the taste of the prepared mixture.    If you have nausea or vomiting during drinking the solution, rinse your mouth with water and take a 15-30 minute break and then continue drinking solution.     Day of procedure     2 hours before your arrival time stop drinking all liquids, including water.   Do not smoke or swallow anything, including water or gum for at least 2 hours before your arrival time. This is a safety issue. Your procedure could be cancelled if you do not follow directions.  No chewing tobacco 6 hours prior to procedure arrival time.     You may take your necessary morning medications with sips of water (4  ounces).   Do not take diabetes medicine by mouth until after your exam.  If you have asthma, bring your inhalers.  Please perform your nebulizer treatments and airway clearance therapy in the morning prior to the procedure (if applicable).    Arrive with a responsible adult who can drive you home and stay with you for up to 24 hours. The medications used during the procedure will make you sleepy, so you won't be able to drive yourself home.   You cannot use public transportation, ride-share services, or non-medical taxi services without a responsible caregiver. Medical transport services are okay, but a caregiver must be there to receive you at your destination.  Please check in with your  when you arrive. Drivers should stay on campus.    Expect to be at the procedure center for about 1.5-2.5 hours.    Do not wear jewelry (i.e. earrings, rings, necklaces, watches, etc.). Leave your purse, billfold, credit cards, and other valuables at home.      Bring insurance card and ID.       Answers to Commonly Asked Questions     How soon can I eat after the procedure?  You may resume your normal diet when you feel ready, unless advised otherwise by the doctor performing your procedure. We recommend starting with a light meal.   Do not drink alcohol for 24 hours after your procedure.  You may resume normal activities (work, exercise, etc.) after 24 hours.    How will I feel after the procedure?  It is normal to feel bloated and gassy after your procedure. Walking will help move the air through your colon. You can take non-aspirin pain relievers that contain acetaminophen (Tylenol).  If you are having sedation, we require a responsible adult to take you home for your safety. The sedation medicines used to relax you during the procedure can impair your judgement and reaction time, and make you forgetful and possibly a little unsteady.  Do not drive, make any important decisions, or sign any legal documents for 24 hours  after your procedure.    When will I get my test results?  You should have your procedure results and any lab results (if applicable) by letter, MyChart message, or phone call within 2 weeks. If you have any questions, please call the doctor that referred you for the procedure.    How do I know if my colon is cleaned out?   After completing the bowel prep, your bowel movements should be all liquid and yellow. Your bowel movements will look similar to urine in the toilet. If there are pieces of stool (poop) in the toilet, or if you can't see to the bottom of the toilet, please call our office for advice. Call [phone] and ask to speak with a nurse.    Why is the Golytely bowel prep taken in several steps?   The stool is flushed out by a large wave of fluid going through the colon. Just sipping a large volume of the solution will not achieve the desired result. Studies have shown that two smaller waves (or more in some cases) are better than one large one.      What if I need to cancel or reschedule my procedure?  Contact our endoscopy scheduling team at [phone], option 2. Monday through Friday, 7:00am-5:00pm.               Sincerely,    [facility]

## 2024-12-23 ENCOUNTER — APPOINTMENT (OUTPATIENT)
Dept: INTERPRETER SERVICES | Facility: CLINIC | Age: 59
End: 2024-12-23
Payer: COMMERCIAL

## 2024-12-23 NOTE — TELEPHONE ENCOUNTER
Pre assessment completed for upcoming procedure.   (Please see previous telephone encounter notes for complete details)          Procedure details:    Arrival time and facility location reviewed.    Pre op exam needed? Yes. Patient needs to complete PAC eval within 30 days of procedure. Informed patient PAC eval is needed via [employer]    Designated  policy reviewed. Instructed to have someone stay 24  hours post procedure.       Medication review:    Medications reviewed. Please see supporting documentation below. Holding recommendations discussed (if applicable).   Oral diabetic medication(s): Jardiance (empagliflozin): HOLD  3 days before procedure.  Metformin (glucophage): HOLD day of procedure..      Prep for procedure:     Procedure prep instructions reviewed.        Any additional information needed:  N/A      Family member verbalized understanding and had no questions or concerns at this time.  Spoke with Daughter with aid of [place] [facility]     Conchis Garcia LPN  Endoscopy Procedure Pre Assessment   [phone] option 2

## 2024-12-26 ENCOUNTER — TELEPHONE (OUTPATIENT)
Dept: PEDIATRICS | Facility: CLINIC | Age: 59
End: 2024-12-26
Payer: COMMERCIAL

## 2025-01-09 ENCOUNTER — HOSPITAL ENCOUNTER (EMERGENCY)
Facility: CLINIC | Age: 60
End: 2025-01-09
Payer: COMMERCIAL

## 2025-01-09 ENCOUNTER — HOSPITAL ENCOUNTER (OUTPATIENT)
Facility: CLINIC | Age: 60
Discharge: HOME | End: 2025-01-09
Attending: STUDENT IN AN ORGANIZED HEALTH CARE EDUCATION/TRAINING PROGRAM | Admitting: STUDENT IN AN ORGANIZED HEALTH CARE EDUCATION/TRAINING PROGRAM
Payer: COMMERCIAL

## 2025-01-09 ENCOUNTER — ANESTHESIA (OUTPATIENT)
Dept: SURGERY | Facility: CLINIC | Age: 60
End: 2025-01-09
Payer: COMMERCIAL

## 2025-01-09 ENCOUNTER — ANESTHESIA EVENT (OUTPATIENT)
Dept: SURGERY | Facility: CLINIC | Age: 60
End: 2025-01-09
Payer: COMMERCIAL

## 2025-01-09 VITALS
SYSTOLIC BLOOD PRESSURE: 114 MMHG | RESPIRATION RATE: 14 BRPM | TEMPERATURE: 97.4 F | BODY MASS INDEX: 34.04 KG/M2 | DIASTOLIC BLOOD PRESSURE: 66 MMHG | HEART RATE: 90 BPM | OXYGEN SATURATION: 95 % | WEIGHT: 216.9 LBS | HEIGHT: 67 IN

## 2025-01-09 LAB
COLONOSCOPY: NORMAL
GLUCOSE BLDC GLUCOMTR-MCNC: 111 MG/DL (ref 70–99)
GLUCOSE BLDC GLUCOMTR-MCNC: 148 MG/DL (ref 70–99)

## 2025-01-09 PROCEDURE — 250N000009 HC RX 250: Performed by: NURSE ANESTHETIST, CERTIFIED REGISTERED

## 2025-01-09 PROCEDURE — 250N000009 HC RX 250: Performed by: STUDENT IN AN ORGANIZED HEALTH CARE EDUCATION/TRAINING PROGRAM

## 2025-01-09 PROCEDURE — 258N000003 HC RX IP 258 OP 636: Performed by: ANESTHESIOLOGY

## 2025-01-09 PROCEDURE — 258N000003 HC RX IP 258 OP 636: Performed by: NURSE ANESTHETIST, CERTIFIED REGISTERED

## 2025-01-09 PROCEDURE — 999N000141 HC STATISTIC PRE-PROCEDURE NURSING ASSESSMENT: Performed by: STUDENT IN AN ORGANIZED HEALTH CARE EDUCATION/TRAINING PROGRAM

## 2025-01-09 PROCEDURE — 710N000012 HC RECOVERY PHASE 2, PER MINUTE: Performed by: STUDENT IN AN ORGANIZED HEALTH CARE EDUCATION/TRAINING PROGRAM

## 2025-01-09 PROCEDURE — 370N000017 HC ANESTHESIA TECHNICAL FEE, PER MIN: Performed by: STUDENT IN AN ORGANIZED HEALTH CARE EDUCATION/TRAINING PROGRAM

## 2025-01-09 PROCEDURE — 88305 TISSUE EXAM BY PATHOLOGIST: CPT | Mod: TC | Performed by: STUDENT IN AN ORGANIZED HEALTH CARE EDUCATION/TRAINING PROGRAM

## 2025-01-09 PROCEDURE — 272N000001 HC OR GENERAL SUPPLY STERILE: Performed by: STUDENT IN AN ORGANIZED HEALTH CARE EDUCATION/TRAINING PROGRAM

## 2025-01-09 PROCEDURE — 250N000011 HC RX IP 250 OP 636: Performed by: NURSE ANESTHETIST, CERTIFIED REGISTERED

## 2025-01-09 PROCEDURE — 360N000075 HC SURGERY LEVEL 2, PER MIN: Performed by: STUDENT IN AN ORGANIZED HEALTH CARE EDUCATION/TRAINING PROGRAM

## 2025-01-09 PROCEDURE — 88305 TISSUE EXAM BY PATHOLOGIST: CPT | Mod: 26 | Performed by: PATHOLOGY

## 2025-01-09 PROCEDURE — 82962 GLUCOSE BLOOD TEST: CPT

## 2025-01-09 RX ORDER — LIDOCAINE 40 MG/G
CREAM TOPICAL
Status: DISCONTINUED | OUTPATIENT
Start: 2025-01-09 | End: 2025-01-09 | Stop reason: HOSPADM

## 2025-01-09 RX ORDER — NALOXONE HYDROCHLORIDE 0.4 MG/ML
0.4 INJECTION, SOLUTION INTRAMUSCULAR; INTRAVENOUS; SUBCUTANEOUS
Status: DISCONTINUED | OUTPATIENT
Start: 2025-01-09 | End: 2025-01-09 | Stop reason: HOSPADM

## 2025-01-09 RX ORDER — FLUMAZENIL 0.1 MG/ML
0.2 INJECTION, SOLUTION INTRAVENOUS
Status: DISCONTINUED | OUTPATIENT
Start: 2025-01-09 | End: 2025-01-09 | Stop reason: HOSPADM

## 2025-01-09 RX ORDER — ONDANSETRON 2 MG/ML
INJECTION INTRAMUSCULAR; INTRAVENOUS PRN
Status: DISCONTINUED | OUTPATIENT
Start: 2025-01-09 | End: 2025-01-09

## 2025-01-09 RX ORDER — ONDANSETRON 2 MG/ML
4 INJECTION INTRAMUSCULAR; INTRAVENOUS
Status: DISCONTINUED | OUTPATIENT
Start: 2025-01-09 | End: 2025-01-09 | Stop reason: HOSPADM

## 2025-01-09 RX ORDER — LIDOCAINE HYDROCHLORIDE 20 MG/ML
INJECTION, SOLUTION INFILTRATION; PERINEURAL PRN
Status: DISCONTINUED | OUTPATIENT
Start: 2025-01-09 | End: 2025-01-09

## 2025-01-09 RX ORDER — ONDANSETRON 2 MG/ML
4 INJECTION INTRAMUSCULAR; INTRAVENOUS EVERY 6 HOURS PRN
Status: DISCONTINUED | OUTPATIENT
Start: 2025-01-09 | End: 2025-01-09 | Stop reason: HOSPADM

## 2025-01-09 RX ORDER — NALOXONE HYDROCHLORIDE 0.4 MG/ML
0.1 INJECTION, SOLUTION INTRAMUSCULAR; INTRAVENOUS; SUBCUTANEOUS
Status: DISCONTINUED | OUTPATIENT
Start: 2025-01-09 | End: 2025-01-09 | Stop reason: HOSPADM

## 2025-01-09 RX ORDER — DEXAMETHASONE SODIUM PHOSPHATE 4 MG/ML
4 INJECTION, SOLUTION INTRA-ARTICULAR; INTRALESIONAL; INTRAMUSCULAR; INTRAVENOUS; SOFT TISSUE
Status: DISCONTINUED | OUTPATIENT
Start: 2025-01-09 | End: 2025-01-09 | Stop reason: HOSPADM

## 2025-01-09 RX ORDER — ONDANSETRON 4 MG/1
4 TABLET, ORALLY DISINTEGRATING ORAL EVERY 6 HOURS PRN
Status: DISCONTINUED | OUTPATIENT
Start: 2025-01-09 | End: 2025-01-09 | Stop reason: HOSPADM

## 2025-01-09 RX ORDER — PROPOFOL 10 MG/ML
INJECTION, EMULSION INTRAVENOUS PRN
Status: DISCONTINUED | OUTPATIENT
Start: 2025-01-09 | End: 2025-01-09

## 2025-01-09 RX ORDER — ONDANSETRON 2 MG/ML
4 INJECTION INTRAMUSCULAR; INTRAVENOUS EVERY 30 MIN PRN
Status: DISCONTINUED | OUTPATIENT
Start: 2025-01-09 | End: 2025-01-09 | Stop reason: HOSPADM

## 2025-01-09 RX ORDER — SODIUM CHLORIDE, SODIUM LACTATE, POTASSIUM CHLORIDE, CALCIUM CHLORIDE 600; 310; 30; 20 MG/100ML; MG/100ML; MG/100ML; MG/100ML
INJECTION, SOLUTION INTRAVENOUS CONTINUOUS
Status: DISCONTINUED | OUTPATIENT
Start: 2025-01-09 | End: 2025-01-09 | Stop reason: HOSPADM

## 2025-01-09 RX ORDER — NALOXONE HYDROCHLORIDE 0.4 MG/ML
0.2 INJECTION, SOLUTION INTRAMUSCULAR; INTRAVENOUS; SUBCUTANEOUS
Status: DISCONTINUED | OUTPATIENT
Start: 2025-01-09 | End: 2025-01-09 | Stop reason: HOSPADM

## 2025-01-09 RX ORDER — PROPOFOL 10 MG/ML
INJECTION, EMULSION INTRAVENOUS CONTINUOUS PRN
Status: DISCONTINUED | OUTPATIENT
Start: 2025-01-09 | End: 2025-01-09

## 2025-01-09 RX ORDER — ONDANSETRON 4 MG/1
4 TABLET, ORALLY DISINTEGRATING ORAL EVERY 30 MIN PRN
Status: DISCONTINUED | OUTPATIENT
Start: 2025-01-09 | End: 2025-01-09 | Stop reason: HOSPADM

## 2025-01-09 RX ORDER — PROCHLORPERAZINE MALEATE 10 MG
10 TABLET ORAL EVERY 6 HOURS PRN
Status: DISCONTINUED | OUTPATIENT
Start: 2025-01-09 | End: 2025-01-09 | Stop reason: HOSPADM

## 2025-01-09 RX ORDER — SODIUM CHLORIDE, SODIUM LACTATE, POTASSIUM CHLORIDE, CALCIUM CHLORIDE 600; 310; 30; 20 MG/100ML; MG/100ML; MG/100ML; MG/100ML
INJECTION, SOLUTION INTRAVENOUS CONTINUOUS PRN
Status: DISCONTINUED | OUTPATIENT
Start: 2025-01-09 | End: 2025-01-09

## 2025-01-09 RX ADMIN — LIDOCAINE HYDROCHLORIDE 50 MG: 20 INJECTION, SOLUTION INFILTRATION; PERINEURAL at 10:21

## 2025-01-09 RX ADMIN — PROPOFOL 150 MCG/KG/MIN: 10 INJECTION, EMULSION INTRAVENOUS at 10:21

## 2025-01-09 RX ADMIN — SODIUM CHLORIDE, POTASSIUM CHLORIDE, SODIUM LACTATE AND CALCIUM CHLORIDE: 600; 310; 30; 20 INJECTION, SOLUTION INTRAVENOUS at 09:30

## 2025-01-09 RX ADMIN — SODIUM CHLORIDE, POTASSIUM CHLORIDE, SODIUM LACTATE AND CALCIUM CHLORIDE: 600; 310; 30; 20 INJECTION, SOLUTION INTRAVENOUS at 08:57

## 2025-01-09 RX ADMIN — ONDANSETRON 4 MG: 2 INJECTION INTRAMUSCULAR; INTRAVENOUS at 10:21

## 2025-01-09 RX ADMIN — PROPOFOL 30 MG: 10 INJECTION, EMULSION INTRAVENOUS at 10:22

## 2025-01-09 ASSESSMENT — ACTIVITIES OF DAILY LIVING (ADL)
ADLS_ACUITY_SCORE: 41
ADLS_ACUITY_SCORE: 48
ADLS_ACUITY_SCORE: 48

## 2025-01-09 NOTE — ANESTHESIA PREPROCEDURE EVALUATION
Anesthesia Pre-Procedure Evaluation    Patient: Jae Ramos   MRN: [id number] : 1965        Procedure : Procedure(s):  COLONOSCOPY          Past Medical History:   Diagnosis Date    Hypothyroidism       No past surgical history on file.   No Known Allergies   Social History     Tobacco Use    Smoking status: Never     Passive exposure: Never    Smokeless tobacco: Never   Substance Use Topics    Alcohol use: No      Wt Readings from Last 1 Encounters:   25 98.4 kg (216 lb 14.4 oz)        Anesthesia Evaluation            ROS/MED HX  ENT/Pulmonary:       Neurologic:       Cardiovascular:       METS/Exercise Tolerance:     Hematologic:       Musculoskeletal:       GI/Hepatic:     (+) GERD, Asymptomatic on medication,                  Renal/Genitourinary:       Endo:     (+)  type II DM,        thyroid problem, hypothyroidism,    Obesity,       Psychiatric/Substance Use:     (+) psychiatric history schizophrenia       Infectious Disease:       Malignancy:       Other:            Physical Exam    Airway        Mallampati: III   TM distance: > 3 FB   Neck ROM: full   Mouth opening: > 3 cm    Respiratory Devices and Support         Dental       (+) Modest Abnormalities - crowns, retainers, 1 or 2 missing teeth      Cardiovascular             Pulmonary                   OUTSIDE LABS:  CBC:   Lab Results   Component Value Date    WBC 6.5 2025    WBC 6.8 10/01/2024    HGB 16.0 (H) 2025    HGB 15.0 10/01/2024    HCT 49.6 (H) 2025    HCT 49.2 (H) 10/01/2024     2025     10/01/2024     BMP:   Lab Results   Component Value Date     2025     2024    POTASSIUM 4.2 2025    POTASSIUM 4.4 2024    CHLORIDE 98 2025    CHLORIDE 99 2024    CO2 30 (H) 2025    CO2 30 (H) 2024    BUN 10.0 2025    BUN 7.0 (L) 2024    CR 0.51 2025    CR 0.56 2024     (H) 2025     (H) 2024  "    COAGS: No results found for: \"PTT\", \"INR\", \"FIBR\"  POC: No results found for: \"BGM\", \"HCG\", \"HCGS\"  HEPATIC:   Lab Results   Component Value Date    ALBUMIN 4.1 06/25/2024    PROTTOTAL 7.5 06/25/2024    ALT 39 06/25/2024    AST 34 06/25/2024    ALKPHOS 106 06/25/2024    BILITOTAL 0.4 06/25/2024     OTHER:   Lab Results   Component Value Date    A1C 6.3 (H) 01/03/2025    LEANDRA 8.8 01/03/2025    AMYLASE 22 (L) 10/25/2019    TSH 1.62 10/01/2024    T4 0.99 08/26/2020       Anesthesia Plan    ASA Status:  2    NPO Status:  NPO Appropriate    Anesthesia Type: MAC.     - Reason for MAC: straight local not clinically adequate, immobility needed   Induction: Intravenous, Propofol.   Maintenance: TIVA.        Consents    Anesthesia Plan(s) and associated risks, benefits, and realistic alternatives discussed. Questions answered and patient/representative(s) expressed understanding.     - Discussed:     - Discussed with:  Patient            Postoperative Care       PONV prophylaxis: Background Propofol Infusion     Comments:               Quinton Sim MD    I have reviewed the pertinent notes and labs in the chart from the past 30 days and (re)examined the patient.  Any updates or changes from those notes are reflected in this note.               # Hypertension: Home medication list includes antihypertensive(s)           # Obesity: Estimated body mass index is 33.97 kg/m  as calculated from the following:    Height as of this encounter: 1.702 m (5' 7\").    Weight as of this encounter: 98.4 kg (216 lb 14.4 oz).             "

## 2025-01-09 NOTE — ANESTHESIA CARE TRANSFER NOTE
Patient: Jae Ramos    Procedure: Procedure(s):  COLONOSCOPY with polypectomy       Diagnosis: Screen for colon cancer [Z12.11]  Diagnosis Additional Information: No value filed.    Anesthesia Type:   MAC     Note:    Oropharynx: spontaneously breathing  Level of Consciousness: drowsy  Oxygen Supplementation: face mask    Independent Airway: airway patency satisfactory and stable  Dentition: dentition unchanged  Vital Signs Stable: post-procedure vital signs reviewed and stable  Report to RN Given: handoff report given  Patient transferred to: Phase II    Handoff Report: Identifed the Patient, Identified the Reponsible Provider, Reviewed the pertinent medical history, Discussed the surgical course, Reviewed Intra-OP anesthesia mangement and issues during anesthesia, Set expectations for post-procedure period and Allowed opportunity for questions and acknowledgement of understanding      Vitals:  Vitals Value Taken Time   BP     Temp     Pulse     Resp     SpO2 100 % 01/09/25 1058   Vitals shown include unfiled device data.    Electronically Signed By: TRANG Mcclure CRNA  January 9, 2025  10:59 AM

## 2025-01-09 NOTE — ANESTHESIA POSTPROCEDURE EVALUATION
Patient: Jae Ramos    Procedure: Procedure(s):  COLONOSCOPY with polypectomy       Anesthesia Type:  MAC    Note:  Disposition: Outpatient   Postop Pain Control: Uneventful            Sign Out: Well controlled pain   PONV: No   Neuro/Psych: Uneventful            Sign Out: Acceptable/Baseline neuro status   Airway/Respiratory: Uneventful            Sign Out: Acceptable/Baseline resp. status   CV/Hemodynamics: Uneventful            Sign Out: Acceptable CV status; No obvious hypovolemia; No obvious fluid overload   Other NRE: NONE   DID A NON-ROUTINE EVENT OCCUR? No           Last vitals:  Vitals Value Taken Time   /66 01/09/25 1135   Temp 97.4  F (36.3  C) 01/09/25 1135   Pulse 88 01/09/25 1120   Resp 14 01/09/25 1135   SpO2 93 % 01/09/25 1137   Vitals shown include unfiled device data.    Electronically Signed By: Quinton Sim MD  January 9, 2025  12:09 PM

## 2025-01-10 LAB
PATH REPORT.COMMENTS IMP SPEC: NORMAL
PATH REPORT.COMMENTS IMP SPEC: NORMAL
PATH REPORT.FINAL DX SPEC: NORMAL
PATH REPORT.GROSS SPEC: NORMAL
PATH REPORT.MICROSCOPIC SPEC OTHER STN: NORMAL
PATH REPORT.RELEVANT HX SPEC: NORMAL
PHOTO IMAGE: NORMAL

## 2025-03-04 ENCOUNTER — TELEPHONE (OUTPATIENT)
Dept: PEDIATRICS | Facility: CLINIC | Age: 60
End: 2025-03-04
Payer: COMMERCIAL

## 2025-03-04 NOTE — TELEPHONE ENCOUNTER
Reason for Call:  Appointment Request    Patient requesting this type of appt: Chronic Diease Management/Medication/Follow-Up    Requested provider: Rohit Velez Mai    Reason patient unable to be scheduled: Not within requested timeframe    When does patient want to be seen/preferred time: 3-7 days    Comments: would like to see PCP before 4/8     Okay to leave a detailed message?: Yes at Cell number on file:    Telephone Information:   Mobile [phone]       Call taken on 3/4/2025 at 1:26 PM by Carli Toure

## 2025-03-10 ENCOUNTER — OFFICE VISIT (OUTPATIENT)
Dept: PEDIATRICS | Facility: CLINIC | Age: 60
End: 2025-03-10
Payer: COMMERCIAL

## 2025-03-10 VITALS
TEMPERATURE: 96.7 F | WEIGHT: 218.9 LBS | SYSTOLIC BLOOD PRESSURE: 127 MMHG | HEIGHT: 67 IN | RESPIRATION RATE: 18 BRPM | OXYGEN SATURATION: 92 % | BODY MASS INDEX: 34.36 KG/M2 | HEART RATE: 101 BPM | DIASTOLIC BLOOD PRESSURE: 84 MMHG

## 2025-03-10 DIAGNOSIS — R52 BODY ACHES: ICD-10-CM

## 2025-03-10 DIAGNOSIS — E66.811 CLASS 1 OBESITY WITH SERIOUS COMORBIDITY AND BODY MASS INDEX (BMI) OF 34.0 TO 34.9 IN ADULT, UNSPECIFIED OBESITY TYPE: ICD-10-CM

## 2025-03-10 DIAGNOSIS — M25.562 CHRONIC PAIN OF BOTH KNEES: ICD-10-CM

## 2025-03-10 DIAGNOSIS — F25.0 SCHIZOAFFECTIVE DISORDER, BIPOLAR TYPE (H): ICD-10-CM

## 2025-03-10 DIAGNOSIS — E11.9 TYPE 2 DIABETES MELLITUS WITHOUT COMPLICATION, WITHOUT LONG-TERM CURRENT USE OF INSULIN (H): Primary | ICD-10-CM

## 2025-03-10 DIAGNOSIS — M25.561 CHRONIC PAIN OF BOTH KNEES: ICD-10-CM

## 2025-03-10 DIAGNOSIS — G89.29 CHRONIC PAIN OF BOTH KNEES: ICD-10-CM

## 2025-03-10 DIAGNOSIS — E78.00 HYPERCHOLESTEREMIA: ICD-10-CM

## 2025-03-10 LAB
EST. AVERAGE GLUCOSE BLD GHB EST-MCNC: 128 MG/DL
HBA1C MFR BLD: 6.1 % (ref 0–5.6)

## 2025-03-10 PROCEDURE — 99214 OFFICE O/P EST MOD 30 MIN: CPT | Performed by: INTERNAL MEDICINE

## 2025-03-10 PROCEDURE — 3079F DIAST BP 80-89 MM HG: CPT | Performed by: INTERNAL MEDICINE

## 2025-03-10 PROCEDURE — G2211 COMPLEX E/M VISIT ADD ON: HCPCS | Performed by: INTERNAL MEDICINE

## 2025-03-10 PROCEDURE — 83036 HEMOGLOBIN GLYCOSYLATED A1C: CPT | Performed by: INTERNAL MEDICINE

## 2025-03-10 PROCEDURE — 82550 ASSAY OF CK (CPK): CPT | Performed by: INTERNAL MEDICINE

## 2025-03-10 PROCEDURE — 80061 LIPID PANEL: CPT | Performed by: INTERNAL MEDICINE

## 2025-03-10 PROCEDURE — 3074F SYST BP LT 130 MM HG: CPT | Performed by: INTERNAL MEDICINE

## 2025-03-10 PROCEDURE — 36415 COLL VENOUS BLD VENIPUNCTURE: CPT | Performed by: INTERNAL MEDICINE

## 2025-03-10 PROCEDURE — 1125F AMNT PAIN NOTED PAIN PRSNT: CPT | Performed by: INTERNAL MEDICINE

## 2025-03-10 RX ORDER — ACETAMINOPHEN 500 MG
500-1000 TABLET ORAL EVERY 6 HOURS PRN
Qty: 180 TABLET | Refills: 1 | Status: SHIPPED | OUTPATIENT
Start: 2025-03-10

## 2025-03-10 RX ORDER — NAPROXEN SODIUM 220 MG/1
220 TABLET, FILM COATED ORAL 2 TIMES DAILY WITH MEALS
Qty: 90 TABLET | Refills: 1 | Status: SHIPPED | OUTPATIENT
Start: 2025-03-10

## 2025-03-10 RX ORDER — CHOLECALCIFEROL (VITAMIN D3) 50 MCG
1 TABLET ORAL DAILY
Qty: 90 TABLET | Refills: 3 | Status: SHIPPED | OUTPATIENT
Start: 2025-03-10

## 2025-03-10 RX ORDER — CALCIUM CARBONATE 500(1250)
500 TABLET ORAL DAILY
Qty: 90 TABLET | Refills: 3 | Status: SHIPPED | OUTPATIENT
Start: 2025-03-10

## 2025-03-10 ASSESSMENT — PAIN SCALES - GENERAL: PAINLEVEL_OUTOF10: SEVERE PAIN (8)

## 2025-03-10 NOTE — PROGRESS NOTES
Assessment & Plan     Type 2 diabetes mellitus without complication, without long-term current use of insulin (H)  - Stable, no side effects with medications, refilled meds today  - renewed lancets and strips  - Continue current dose of jardiance - will update once I review A1C  - Checking A1C a little early today due to daughters concern that diabetes has not been as well controlled at home.    F/up in 6 months for medicare physical, 3 months if A1C not at goal    - empagliflozin (JARDIANCE) 10 MG TABS tablet; Take 1 tablet (10 mg) by mouth daily.  - blood glucose (KWADWO CONTOUR) test strip; 1 strip by In Vitro route daily.  - calcium carbonate 500 mg, elemental, (OSCAL) 500 MG tablet; Take 1 tablet (500 mg) by mouth daily.  - blood glucose (NO BRAND SPECIFIED) lancets standard; Use to test blood sugar daily times daily or as directed.  - Hemoglobin A1c; Future  - Hemoglobin A1c    Class 1 obesity with serious comorbidity and body mass index (BMI) of 34.0 to 34.9 in adult, unspecified obesity type  - vitamin D3 (CHOLECALCIFEROL) 50 mcg (2000 units) tablet; Take 1 tablet (50 mcg) by mouth daily.    Schizoaffective disorder, bipolar type (H)  Stable, meds managed per psychiatry.    Hypercholesteremia  - Lipid panel reflex to direct LDL Non-fasting; Future  - Lipid panel reflex to direct LDL Non-fasting    Body aches  Reports bilateral knee pain and muscle aches throughout her body which are chronic, but worse over the last 3 months.  Checking CPK and may advise switch from atorvastatin to rosuvastatin.  At this time, daughter requesting prescription for tylenol and aleve.  - CK total; Future  - acetaminophen (TYLENOL) 500 MG tablet; Take 1-2 tablets (500-1,000 mg) by mouth every 6 hours as needed for mild pain.  - naproxen sodium (ANAPROX) 220 MG tablet; Take 1 tablet (220 mg) by mouth 2 times daily (with meals).  - CK total    Chronic pain of both knees  Agreeable to PT for knee pain.  - Physical Therapy   "Referral; Future      The longitudinal plan of care for the diagnosis(es)/condition(s) as documented were addressed during this visit. Due to the added complexity in care, I will continue to support Jae in the subsequent management and with ongoing continuity of care.    BMI  Estimated body mass index is 34.28 kg/m  as calculated from the following:    Height as of this encounter: 1.702 m (5' 7\").    Weight as of this encounter: 99.3 kg (218 lb 14.4 oz).   Weight management plan: Discussed healthy diet and exercise guidelines      Patient Instructions   -- balanced diet with adequate calcium (1200 mg/day) and vitamin D (800 - 2000 units/day) intake.    -- It is difficult to get all the required vitamin D from diet alone, so supplementation is typically recommended    See below for foods rich in calcium    Here's a list of foods rich in calcium and their approximate calcium content in milligrams (mg):    Dairy Products  - Yogurt, plain, low-fat: 310 mg per 6 oz[1]  - Milk (skim, low-fat, whole): 300 mg per 8 oz[1][3]  - Ricotta cheese, part-skim: 335 mg per 4 oz[1]  - Mozzarella cheese: 210 mg per 1 oz[1]  - Cheddar cheese: 205 mg per 1 oz[1]  - Greek yogurt: 200 mg per 6 oz[1]    Seafood  - Sardines, canned with bones: 325 mg per 3 oz[1]  - [place], canned with bones: 180 mg per 3 oz[1]  - Mackerel, canned: 250 mg per 3 oz[3]    ## Vegetables  - Radha greens, cooked: 266 mg per 1 cup[1]  - Spinach, cooked: 240 mg per 1 cup[3]  - Kale, cooked: 179 mg per 1 cup[1]  - Broccoli, cooked: 180 mg per 1 cup[3]    Fortified Foods  - [place] milk, rice milk, or soy milk (fortified): 300-450 mg per 8 oz[1]  - Orange juice (fortified): 300 mg per 8 oz[1]  - Cereals (calcium-fortified): 250-1000 mg per 0.5 to 1 cup[3]    Nuts and Seeds  - Almonds: 80 mg per 1 oz[3]  - Sesame seeds, whole roasted: 280 mg per 1 oz[3]    Other  - Soybeans, cooked: 175 mg per 1 cup[1]  - Tofu, firm, calcium-set: 250-750 mg per 4 oz[3]  - Figs, " "dried: 300 mg per 1 cup[3]    Sofya Rowe is a 60 year old, presenting for the following health issues:  Diabetes        3/10/2025    10:38 AM   Additional Questions   Roomed by shanon   Accompanied by Mor daughter         3/10/2025    10:38 AM   Patient Reported Additional Medications   Patient reports taking the following new medications na     History of Present Illness       Diabetes:   She presents for follow up of diabetes.    She is not checking blood glucose.         She has no concerns regarding her diabetes at this time.   She is not experiencing numbness or burning in feet, excessive thirst, blurry vision, weight changes or redness, sores or blisters on feet.           She eats 0-1 servings of fruits and vegetables daily.She consumes 0 sweetened beverage(s) daily.She exercises with enough effort to increase her heart rate 20 to 29 minutes per day.  She exercises with enough effort to increase her heart rate 7 days per week.   She is taking medications regularly.          -- balanced diet with adequate calcium (1200 mg/day) and vitamin D (800 - 2000 units/day) intake.  It is difficult to get all the required vitamin D from diet alone, so supplementation is typically recommended        All other systems on a 10-point review are negative, unless otherwise noted in HPI        Objective    /84   Pulse 101   Temp (!) 96.7  F (35.9  C) (Temporal)   Resp 18   Ht 1.702 m (5' 7\")   Wt 99.3 kg (218 lb 14.4 oz)   SpO2 92%   BMI 34.28 kg/m    Body mass index is 34.28 kg/m .  Physical Exam   GENERAL: healthy, alert and no distress  EYES: Eyes grossly normal to inspection  NECK: no asymmetry, masses, or scars  MS: no gross musculoskeletal defects noted, no edema  SKIN: no suspicious lesions or rashes  NEURO: mentation intact and speech normal  PSYCH: mentation appears normal and affect normal/bright              Signed Electronically by: Charmaine Velez MD    "

## 2025-03-10 NOTE — PATIENT INSTRUCTIONS
-- balanced diet with adequate calcium (1200 mg/day) and vitamin D (800 - 2000 units/day) intake.    -- It is difficult to get all the required vitamin D from diet alone, so supplementation is typically recommended    See below for foods rich in calcium    Here's a list of foods rich in calcium and their approximate calcium content in milligrams (mg):    Dairy Products  - Yogurt, plain, low-fat: 310 mg per 6 oz[1]  - Milk (skim, low-fat, whole): 300 mg per 8 oz[1][3]  - Ricotta cheese, part-skim: 335 mg per 4 oz[1]  - Mozzarella cheese: 210 mg per 1 oz[1]  - Cheddar cheese: 205 mg per 1 oz[1]  - Greek yogurt: 200 mg per 6 oz[1]    Seafood  - Sardines, canned with bones: 325 mg per 3 oz[1]  - [place], canned with bones: 180 mg per 3 oz[1]  - Mackerel, canned: 250 mg per 3 oz[3]    ## Vegetables  - Radha greens, cooked: 266 mg per 1 cup[1]  - Spinach, cooked: 240 mg per 1 cup[3]  - Kale, cooked: 179 mg per 1 cup[1]  - Broccoli, cooked: 180 mg per 1 cup[3]    Fortified Foods  - [place] milk, rice milk, or soy milk (fortified): 300-450 mg per 8 oz[1]  - Orange juice (fortified): 300 mg per 8 oz[1]  - Cereals (calcium-fortified): 250-1000 mg per 0.5 to 1 cup[3]    Nuts and Seeds  - Almonds: 80 mg per 1 oz[3]  - Sesame seeds, whole roasted: 280 mg per 1 oz[3]    Other  - Soybeans, cooked: 175 mg per 1 cup[1]  - Tofu, firm, calcium-set: 250-750 mg per 4 oz[3]  - Figs, dried: 300 mg per 1 cup[3]

## 2025-03-10 NOTE — LETTER
March 11, 2025      Jae Ramos  [address] [place] [address]        Dear Jae,     Your lab results are stable.  At this time, I do not recommend any changes to your current plan of care.     Please feel free to call with any questions.  Otherwise, we can discuss further at your next appointment.     Sincerely,     Charmaine Velez MD                                                     Resulted Orders   Lipid panel reflex to direct LDL Non-fasting   Result Value Ref Range    Cholesterol 123 <200 mg/dL    Triglycerides 242 (H) <150 mg/dL    Direct Measure HDL 33 (L) >=50 mg/dL    LDL Cholesterol Calculated 42 <100 mg/dL    Non HDL Cholesterol 90 <130 mg/dL    Patient Fasting > 8hrs? Unknown     Narrative    Cholesterol  Desirable: < 200 mg/dL  Borderline High: 200 - 239 mg/dL  High: >= 240 mg/dL    Triglycerides  Normal: < 150 mg/dL  Borderline High: 150 - 199 mg/dL  High: 200-499 mg/dL  Very High: >= 500 mg/dL    Direct Measure HDL  Female: >= 50 mg/dL   Male: >= 40 mg/dL    LDL Cholesterol  Desirable: < 100 mg/dL  Above Desirable: 100 - 129 mg/dL   Borderline High: 130 - 159 mg/dL   High:  160 - 189 mg/dL   Very High: >= 190 mg/dL    Non HDL Cholesterol  Desirable: < 130 mg/dL  Above Desirable: 130 - 159 mg/dL  Borderline High: 160 - 189 mg/dL  High: 190 - 219 mg/dL  Very High: >= 220 mg/dL   Hemoglobin A1c   Result Value Ref Range    Estimated Average Glucose 128 (H) <117 mg/dL    Hemoglobin A1C 6.1 (H) 0.0 - 5.6 %      Comment:      Normal <5.7%   Prediabetes 5.7-6.4%    Diabetes 6.5% or higher     Note: Adopted from ADA consensus guidelines.   CK total   Result Value Ref Range    CK 51 26 - 192 U/L

## 2025-03-11 LAB
CHOLEST SERPL-MCNC: 123 MG/DL
CK SERPL-CCNC: 51 U/L (ref 26–192)
FASTING STATUS PATIENT QL REPORTED: ABNORMAL
HDLC SERPL-MCNC: 33 MG/DL
LDLC SERPL CALC-MCNC: 42 MG/DL
NONHDLC SERPL-MCNC: 90 MG/DL
TRIGL SERPL-MCNC: 242 MG/DL

## 2025-06-02 DIAGNOSIS — R52 BODY ACHES: ICD-10-CM

## 2025-06-02 DIAGNOSIS — R60.0 PEDAL EDEMA: ICD-10-CM

## 2025-06-03 RX ORDER — NAPROXEN SODIUM 220 MG/1
220 TABLET, FILM COATED ORAL 2 TIMES DAILY WITH MEALS
Qty: 90 TABLET | Refills: 1 | Status: SHIPPED | OUTPATIENT
Start: 2025-06-03

## 2025-06-03 RX ORDER — FUROSEMIDE 20 MG/1
20 TABLET ORAL DAILY
Qty: 90 TABLET | Refills: 1 | Status: SHIPPED | OUTPATIENT
Start: 2025-06-03

## 2025-06-11 ENCOUNTER — THERAPY VISIT (OUTPATIENT)
Dept: PHYSICAL THERAPY | Facility: CLINIC | Age: 60
End: 2025-06-11
Payer: COMMERCIAL

## 2025-06-11 DIAGNOSIS — M25.561 CHRONIC PAIN OF BOTH KNEES: Primary | ICD-10-CM

## 2025-06-11 DIAGNOSIS — G89.29 CHRONIC PAIN OF BOTH KNEES: Primary | ICD-10-CM

## 2025-06-11 DIAGNOSIS — M25.562 CHRONIC PAIN OF BOTH KNEES: Primary | ICD-10-CM

## 2025-06-11 PROCEDURE — 97110 THERAPEUTIC EXERCISES: CPT | Mod: GP | Performed by: PHYSICAL THERAPIST

## 2025-06-11 PROCEDURE — 97162 PT EVAL MOD COMPLEX 30 MIN: CPT | Mod: GP | Performed by: PHYSICAL THERAPIST

## 2025-06-11 NOTE — PROGRESS NOTES
PHYSICAL THERAPY EVALUATION  Type of Visit: Evaluation       Fall Risk Screen:       Subjective   Pt's stepdaughter stated pain when rising from a chair. Pain on the anterior portion of the knee. Pain is keeping the pt up at night and throughout the day for sx. No radiating sx into the lower legs. Pt has been doing a few exercises with ambulation and climbing stairs. Pt will no longer go for walks outside due to falls. Multiple falls have occurred in the past. Pt states feeling unstable at this time.      Presenting condition or subjective complaint:    Date of onset: 03/10/25 (order date)    Relevant medical history:     Dates & types of surgery:      Prior diagnostic imaging/testing results:       Prior therapy history for the same diagnosis, illness or injury:        Prior Level of Function  Independent with all mobility, assistance from family members due to mental health    Living Environment    Patient goals for therapy:  less pain    Pain assessment: unable to give clear indication of pain level     Objective   KNEE EVALUATION  PAIN: Pain Location: knee  POSTURE: fwd bend at the hips with fwd lean  GAIT: wide SILVIA with toeing out b/l, unsteady and chooses to grab onto counter when able  BALANCE/PROPRIOCEPTION: romberg- 30 sec holds, slight sway, pt doesn't feel steady;  modified tandem- 3 sec with grabbing onto the counter for support and stability  ROM: lacking full extension, increase in pain at end range for flexion and extension  STRENGTH: LLE weaker than R with tasks;  hip flexion- 3/5 L, 4/5 R;  hip abd- 3-/5 b/l with compensation into hip flexion position;  ER- 4/5 b/l;  quad- 4/5 R, 4-/5 L;  HS- 3+/5 L, 4-/5 R; DF- 4/5 R, 3+/5 L  FUNCTIONAL TESTS: STS- use of UEs to rise from a chair      Assessment & Plan   CLINICAL IMPRESSIONS  Medical Diagnosis: Chronic pain of both knees (M25.561, M25.562, G89.29)    Treatment Diagnosis: b/l knee pain and generalized weakness   Impression/Assessment: Patient is a  60 year old female with b/l knee pain and generalized weakness complaints.  The following significant findings have been identified: Pain, Decreased ROM/flexibility, Decreased joint mobility, Decreased strength, Impaired balance, Impaired gait, Impaired muscle performance, and Decreased activity tolerance. These impairments interfere with their ability to perform self care tasks, recreational activities, household chores, household mobility, and community mobility as compared to previous level of function.     Clinical Decision Making (Complexity):  Clinical Presentation: Evolving/Changing  Clinical Presentation Rationale: based on medical and personal factors listed in PT evaluation  Clinical Decision Making (Complexity): Moderate Complexity    PLAN OF CARE  Treatment Interventions:  Interventions: Gait Training, Manual Therapy, Neuromuscular Re-education, Therapeutic Activity, Therapeutic Exercise, Self-Care/Home Management    Long Term Goals     PT Goal 1  Goal Identifier: STG  Goal Description: Pt will be able to rise from a standard height chair 5x without UE use in 4 weeks  Rationale: to maximize safety and independence with performance of ADLs and functional tasks;to maximize safety and independence with self cares  Target Date: 07/09/25  PT Goal 2  Goal Identifier: LTG  Goal Description: Pt will be able to hold a semi tandem stance for 30 sec hold without loss of balance in 8 weeks.  Rationale: to maximize safety and independence with performance of ADLs and functional tasks;to maximize safety and independence with self cares  Target Date: 08/06/25      Frequency of Treatment: 1x/wk  Duration of Treatment: 8 weeks    Education Assessment:   Learner/Method: Patient;Listening;Demonstration  Education Comments: difficulty following directions and cues, assistance from family member to direct, language barrier    Risks and benefits of evaluation/treatment have been explained.   Patient/Family/caregiver agrees  with Plan of Care.     Evaluation Time:     PT Candida Low Complexity Minutes (37237): 20     Signing Clinician: CELI Espinoza [facility]                                                                                   OUTPATIENT PHYSICAL THERAPY      PLAN OF TREATMENT FOR OUTPATIENT REHABILITATION   Patient's Last Name, First Name, Jae Navas YOB: 1965   Provider's Name   [facility]   Medical Record No.  [id number]     Onset Date: 03/10/25 (order date)  Start of Care Date: 06/11/25     Medical Diagnosis:  Chronic pain of both knees (M25.561, M25.562, G89.29)      PT Treatment Diagnosis:  b/l knee pain and generalized weakness Plan of Treatment  Frequency/Duration: 1x/wk/ 8 weeks    Certification date from 06/11/25 to 08/06/25         See note for plan of treatment details and functional goals     Dorinda Wallace PT                         I CERTIFY THE NEED FOR THESE SERVICES FURNISHED UNDER        THIS PLAN OF TREATMENT AND WHILE UNDER MY CARE     (Physician attestation of this document indicates review and certification of the therapy plan).              Referring Provider:  Rohit Velez MD    Initial Assessment  See Epic Evaluation- Start of Care Date: 06/11/25

## 2025-06-25 ENCOUNTER — THERAPY VISIT (OUTPATIENT)
Dept: PHYSICAL THERAPY | Facility: CLINIC | Age: 60
End: 2025-06-25
Payer: COMMERCIAL

## 2025-06-25 DIAGNOSIS — M25.561 CHRONIC PAIN OF BOTH KNEES: Primary | ICD-10-CM

## 2025-06-25 DIAGNOSIS — G89.29 CHRONIC PAIN OF BOTH KNEES: Primary | ICD-10-CM

## 2025-06-25 DIAGNOSIS — M25.562 CHRONIC PAIN OF BOTH KNEES: Primary | ICD-10-CM

## 2025-06-25 PROCEDURE — 97110 THERAPEUTIC EXERCISES: CPT | Mod: GP | Performed by: PHYSICAL THERAPIST

## 2025-07-02 ENCOUNTER — THERAPY VISIT (OUTPATIENT)
Dept: PHYSICAL THERAPY | Facility: CLINIC | Age: 60
End: 2025-07-02
Payer: COMMERCIAL

## 2025-07-02 DIAGNOSIS — G89.29 CHRONIC PAIN OF BOTH KNEES: Primary | ICD-10-CM

## 2025-07-02 DIAGNOSIS — M25.562 CHRONIC PAIN OF BOTH KNEES: Primary | ICD-10-CM

## 2025-07-02 DIAGNOSIS — M25.561 CHRONIC PAIN OF BOTH KNEES: Primary | ICD-10-CM

## 2025-07-02 PROCEDURE — 97110 THERAPEUTIC EXERCISES: CPT | Mod: GP | Performed by: PHYSICAL THERAPIST

## 2025-07-09 ENCOUNTER — THERAPY VISIT (OUTPATIENT)
Dept: PHYSICAL THERAPY | Facility: CLINIC | Age: 60
End: 2025-07-09
Payer: COMMERCIAL

## 2025-07-09 DIAGNOSIS — G89.29 CHRONIC PAIN OF BOTH KNEES: Primary | ICD-10-CM

## 2025-07-09 DIAGNOSIS — M25.562 CHRONIC PAIN OF BOTH KNEES: Primary | ICD-10-CM

## 2025-07-09 DIAGNOSIS — M25.561 CHRONIC PAIN OF BOTH KNEES: Primary | ICD-10-CM

## 2025-07-09 PROCEDURE — 97110 THERAPEUTIC EXERCISES: CPT | Mod: GP | Performed by: PHYSICAL THERAPIST

## 2025-07-30 ENCOUNTER — THERAPY VISIT (OUTPATIENT)
Dept: PHYSICAL THERAPY | Facility: CLINIC | Age: 60
End: 2025-07-30
Payer: COMMERCIAL

## 2025-07-30 DIAGNOSIS — M25.561 CHRONIC PAIN OF BOTH KNEES: Primary | ICD-10-CM

## 2025-07-30 DIAGNOSIS — G89.29 CHRONIC PAIN OF BOTH KNEES: Primary | ICD-10-CM

## 2025-07-30 DIAGNOSIS — M25.562 CHRONIC PAIN OF BOTH KNEES: Primary | ICD-10-CM

## 2025-07-30 PROCEDURE — 97110 THERAPEUTIC EXERCISES: CPT | Mod: GP | Performed by: PHYSICAL THERAPIST

## 2025-08-06 ENCOUNTER — THERAPY VISIT (OUTPATIENT)
Dept: PHYSICAL THERAPY | Facility: CLINIC | Age: 60
End: 2025-08-06
Payer: COMMERCIAL

## 2025-08-06 DIAGNOSIS — M25.562 CHRONIC PAIN OF BOTH KNEES: Primary | ICD-10-CM

## 2025-08-06 DIAGNOSIS — M25.561 CHRONIC PAIN OF BOTH KNEES: Primary | ICD-10-CM

## 2025-08-06 DIAGNOSIS — G89.29 CHRONIC PAIN OF BOTH KNEES: Primary | ICD-10-CM

## 2025-08-06 PROCEDURE — 97110 THERAPEUTIC EXERCISES: CPT | Mod: GP | Performed by: PHYSICAL THERAPIST

## 2025-08-13 ENCOUNTER — THERAPY VISIT (OUTPATIENT)
Dept: PHYSICAL THERAPY | Facility: CLINIC | Age: 60
End: 2025-08-13
Payer: COMMERCIAL

## 2025-08-13 DIAGNOSIS — M25.562 CHRONIC PAIN OF BOTH KNEES: Primary | ICD-10-CM

## 2025-08-13 DIAGNOSIS — M25.561 CHRONIC PAIN OF BOTH KNEES: Primary | ICD-10-CM

## 2025-08-13 DIAGNOSIS — G89.29 CHRONIC PAIN OF BOTH KNEES: Primary | ICD-10-CM

## 2025-08-13 PROCEDURE — 97110 THERAPEUTIC EXERCISES: CPT | Mod: GP | Performed by: PHYSICAL THERAPIST

## 2025-08-29 DIAGNOSIS — R52 BODY ACHES: ICD-10-CM

## 2025-09-02 RX ORDER — NAPROXEN SODIUM 220 MG/1
220 TABLET, FILM COATED ORAL 2 TIMES DAILY WITH MEALS
Qty: 60 TABLET | Refills: 0 | Status: SHIPPED | OUTPATIENT
Start: 2025-09-02

## 2025-09-03 ENCOUNTER — THERAPY VISIT (OUTPATIENT)
Dept: PHYSICAL THERAPY | Facility: CLINIC | Age: 60
End: 2025-09-03
Payer: COMMERCIAL

## 2025-09-03 DIAGNOSIS — M25.562 CHRONIC PAIN OF BOTH KNEES: Primary | ICD-10-CM

## 2025-09-03 DIAGNOSIS — G89.29 CHRONIC PAIN OF BOTH KNEES: Primary | ICD-10-CM

## 2025-09-03 DIAGNOSIS — M25.561 CHRONIC PAIN OF BOTH KNEES: Primary | ICD-10-CM

## 2025-09-03 PROCEDURE — 97110 THERAPEUTIC EXERCISES: CPT | Mod: GP | Performed by: PHYSICAL THERAPIST

## (undated) DEVICE — SPECIMEN TRAP MUCOUS SIMILAC NTRL CARE GRAD 80ML 0045860

## (undated) DEVICE — ENDO SNARE EXACTO COLD 9MM LOOP 2.4MMX230CM 00711115

## (undated) DEVICE — SUCTION MANIFOLD NEPTUNE 2 SYS 4 PORT 0702-020-000

## (undated) DEVICE — SYR 50ML SLIP TIP W/O NDL 309654

## (undated) DEVICE — KIT ENDO FIRST STEP DISINFECTANT 200ML W/POUCH EP-4

## (undated) DEVICE — TUBING SUCTION MEDI-VAC SOFT 3/16"X20' N520A

## (undated) DEVICE — APPLICATOR COTTON TIP 6"X2 STERILE LF 6012

## (undated) DEVICE — PAD CHUX UNDERPAD 30X36" P3036C

## (undated) DEVICE — KIT PROCEDURE W/CLEAN-A-SCOPE LINERS V2 200800

## (undated) DEVICE — BAG CLEAR TRASH 1.3M 39X33" P4040C

## (undated) RX ORDER — SIMETHICONE 40MG/0.6ML
SUSPENSION, DROPS(FINAL DOSAGE FORM)(ML) ORAL
Status: DISPENSED
Start: 2025-01-09